# Patient Record
Sex: FEMALE | Race: WHITE | NOT HISPANIC OR LATINO | URBAN - METROPOLITAN AREA
[De-identification: names, ages, dates, MRNs, and addresses within clinical notes are randomized per-mention and may not be internally consistent; named-entity substitution may affect disease eponyms.]

---

## 2018-07-16 ENCOUNTER — OUTPATIENT (OUTPATIENT)
Dept: OUTPATIENT SERVICES | Facility: HOSPITAL | Age: 68
LOS: 1 days | Discharge: HOME | End: 2018-07-16

## 2018-07-16 NOTE — H&P CARDIOLOGY - HISTORY OF PRESENT ILLNESS
67 year old female with pmhx of COPD , DLD, HTN presents for elective cardiac cath after +ve stress echo. with ST depressions and anterseptal and basal inferior changes on echo.

## 2018-07-16 NOTE — PROGRESS NOTE ADULT - SUBJECTIVE AND OBJECTIVE BOX
I have personally seen and examined the patient.  I agree with the history and physical which I have reviewed and noted any changes below.  07-16-18 @ 08:23    PRE-OP DIAGNOSIS: cad    PROCEDURE:    Physician: jyoti  Assistant:    ANESTHESIA TYPE:  [  ]General Anesthesia  [  ] Sedation  [  ] Local/Regional    ESTIMATED BLOOD LOSS:       mL    CONDITION  [  ] Critical  [  ] Serious  [  ]Fair  [  ]Good      SPECIMENS REMOVED (IF APPLICABLE):      IV CONTRAST:             mL      IMPLANTS (IF APPLICABLE)      FINDINGS    Left Heart Catheterization:  LVEF%:  LVEDP:  [ ] Normal Coronary Arteries  [ ] Luminal Irregularities  [ ] Non-obstructive CAD      LEFT HEART CATHERIZATION                                    Left main     LAD:                        Diag:    Left Circumflex:  OM:    Right Cornary Artery:  RPDA  RPL    Ramus Intermed:    INTERVENTION  IMPLANTS:    RIGHT HEART CATHERIZATION  PA:  PCW:  CO/CI:      POST-OP DIAGNOSIS          PLAN OF CARE  [ ] D/C Home today  [ ]  D/C in AM  [ ] Return to In-patient bed  [ ] Admit for observation  [ ] Return for staged procedure:  [ ] CT Surgery consult called  [ ]  Continue DAPT, B-blocker & Statin therapy I have personally seen and examined the patient.  I agree with the history and physical which I have reviewed and noted any changes below.  07-16-18 @ 08:23    PRE-OP DIAGNOSIS: cad    PROCEDURE: lhc/sca    Physician: jyoti  Assistant: ling    ANESTHESIA TYPE:  [  ]General Anesthesia  [ x ] Sedation  [x  ] Local/Regional    ESTIMATED BLOOD LOSS:  10     mL    CONDITION  [  ] Critical  [  ] Serious  [  ]Fair  [x  ]Good      SPECIMENS REMOVED (IF APPLICABLE):      IV CONTRAST:      10       mL      IMPLANTS (IF APPLICABLE)      FINDINGS    Left Heart Catheterization:  LVEF%:  LVEDP:  [ ] Normal Coronary Arteries  [ ] Luminal Irregularities  [x ] Non-obstructive CAD      POST-OP DIAGNOSIS    non obs cad      PLAN OF CARE  [ x] D/C Home today  [ ]  D/C in AM  [ ] Return to In-patient bed  [ ] Admit for observation  [ ] Return for staged procedure:  [ ] CT Surgery consult called  [x ]  Continue with aggressive risk modif

## 2018-07-18 DIAGNOSIS — I10 ESSENTIAL (PRIMARY) HYPERTENSION: ICD-10-CM

## 2018-07-18 DIAGNOSIS — R94.39 ABNORMAL RESULT OF OTHER CARDIOVASCULAR FUNCTION STUDY: ICD-10-CM

## 2018-07-18 DIAGNOSIS — E78.4 OTHER HYPERLIPIDEMIA: ICD-10-CM

## 2018-07-18 DIAGNOSIS — J44.9 CHRONIC OBSTRUCTIVE PULMONARY DISEASE, UNSPECIFIED: ICD-10-CM

## 2018-07-24 PROBLEM — Z00.00 ENCOUNTER FOR PREVENTIVE HEALTH EXAMINATION: Status: ACTIVE | Noted: 2018-07-24

## 2018-08-03 ENCOUNTER — APPOINTMENT (OUTPATIENT)
Dept: CARDIOLOGY | Facility: CLINIC | Age: 68
End: 2018-08-03

## 2018-08-03 VITALS
BODY MASS INDEX: 24.75 KG/M2 | HEIGHT: 66 IN | DIASTOLIC BLOOD PRESSURE: 68 MMHG | HEART RATE: 63 BPM | WEIGHT: 154 LBS | SYSTOLIC BLOOD PRESSURE: 120 MMHG

## 2018-08-03 DIAGNOSIS — R00.2 PALPITATIONS: ICD-10-CM

## 2018-08-03 DIAGNOSIS — R06.09 OTHER FORMS OF DYSPNEA: ICD-10-CM

## 2018-08-03 DIAGNOSIS — E78.5 HYPERLIPIDEMIA, UNSPECIFIED: ICD-10-CM

## 2018-08-03 DIAGNOSIS — Z78.9 OTHER SPECIFIED HEALTH STATUS: ICD-10-CM

## 2018-08-03 DIAGNOSIS — I25.10 ATHEROSCLEROTIC HEART DISEASE OF NATIVE CORONARY ARTERY W/OUT ANGINA PECTORIS: ICD-10-CM

## 2021-07-19 ENCOUNTER — INPATIENT (INPATIENT)
Facility: HOSPITAL | Age: 71
LOS: 1 days | Discharge: HOME | End: 2021-07-21
Attending: INTERNAL MEDICINE | Admitting: INTERNAL MEDICINE
Payer: MEDICARE

## 2021-07-19 VITALS
RESPIRATION RATE: 18 BRPM | SYSTOLIC BLOOD PRESSURE: 237 MMHG | HEIGHT: 66 IN | HEART RATE: 98 BPM | DIASTOLIC BLOOD PRESSURE: 98 MMHG | OXYGEN SATURATION: 99 % | TEMPERATURE: 98 F | WEIGHT: 149.91 LBS

## 2021-07-19 LAB
ALBUMIN SERPL ELPH-MCNC: 4.3 G/DL — SIGNIFICANT CHANGE UP (ref 3.5–5.2)
ALP SERPL-CCNC: 106 U/L — SIGNIFICANT CHANGE UP (ref 30–115)
ALT FLD-CCNC: 19 U/L — SIGNIFICANT CHANGE UP (ref 0–41)
ANION GAP SERPL CALC-SCNC: 10 MMOL/L — SIGNIFICANT CHANGE UP (ref 7–14)
APTT BLD: 33.6 SEC — SIGNIFICANT CHANGE UP (ref 27–39.2)
AST SERPL-CCNC: 23 U/L — SIGNIFICANT CHANGE UP (ref 0–41)
BASOPHILS # BLD AUTO: 0.05 K/UL — SIGNIFICANT CHANGE UP (ref 0–0.2)
BASOPHILS NFR BLD AUTO: 0.6 % — SIGNIFICANT CHANGE UP (ref 0–1)
BILIRUB SERPL-MCNC: 0.3 MG/DL — SIGNIFICANT CHANGE UP (ref 0.2–1.2)
BUN SERPL-MCNC: 14 MG/DL — SIGNIFICANT CHANGE UP (ref 10–20)
CALCIUM SERPL-MCNC: 9.3 MG/DL — SIGNIFICANT CHANGE UP (ref 8.5–10.1)
CHLORIDE SERPL-SCNC: 104 MMOL/L — SIGNIFICANT CHANGE UP (ref 98–110)
CO2 SERPL-SCNC: 29 MMOL/L — SIGNIFICANT CHANGE UP (ref 17–32)
CREAT SERPL-MCNC: 0.6 MG/DL — LOW (ref 0.7–1.5)
EOSINOPHIL # BLD AUTO: 0.25 K/UL — SIGNIFICANT CHANGE UP (ref 0–0.7)
EOSINOPHIL NFR BLD AUTO: 2.9 % — SIGNIFICANT CHANGE UP (ref 0–8)
GLUCOSE SERPL-MCNC: 82 MG/DL — SIGNIFICANT CHANGE UP (ref 70–99)
HCT VFR BLD CALC: 41.4 % — SIGNIFICANT CHANGE UP (ref 37–47)
HGB BLD-MCNC: 13.6 G/DL — SIGNIFICANT CHANGE UP (ref 12–16)
IMM GRANULOCYTES NFR BLD AUTO: 0.3 % — SIGNIFICANT CHANGE UP (ref 0.1–0.3)
INR BLD: 1.15 RATIO — SIGNIFICANT CHANGE UP (ref 0.65–1.3)
LYMPHOCYTES # BLD AUTO: 1.3 K/UL — SIGNIFICANT CHANGE UP (ref 1.2–3.4)
LYMPHOCYTES # BLD AUTO: 15.1 % — LOW (ref 20.5–51.1)
MCHC RBC-ENTMCNC: 28.9 PG — SIGNIFICANT CHANGE UP (ref 27–31)
MCHC RBC-ENTMCNC: 32.9 G/DL — SIGNIFICANT CHANGE UP (ref 32–37)
MCV RBC AUTO: 88.1 FL — SIGNIFICANT CHANGE UP (ref 81–99)
MONOCYTES # BLD AUTO: 0.8 K/UL — HIGH (ref 0.1–0.6)
MONOCYTES NFR BLD AUTO: 9.3 % — SIGNIFICANT CHANGE UP (ref 1.7–9.3)
NEUTROPHILS # BLD AUTO: 6.2 K/UL — SIGNIFICANT CHANGE UP (ref 1.4–6.5)
NEUTROPHILS NFR BLD AUTO: 71.8 % — SIGNIFICANT CHANGE UP (ref 42.2–75.2)
NRBC # BLD: 0 /100 WBCS — SIGNIFICANT CHANGE UP (ref 0–0)
PLATELET # BLD AUTO: 278 K/UL — SIGNIFICANT CHANGE UP (ref 130–400)
POTASSIUM SERPL-MCNC: 4.1 MMOL/L — SIGNIFICANT CHANGE UP (ref 3.5–5)
POTASSIUM SERPL-SCNC: 4.1 MMOL/L — SIGNIFICANT CHANGE UP (ref 3.5–5)
PROT SERPL-MCNC: 6.8 G/DL — SIGNIFICANT CHANGE UP (ref 6–8)
PROTHROM AB SERPL-ACNC: 13.2 SEC — HIGH (ref 9.95–12.87)
RBC # BLD: 4.7 M/UL — SIGNIFICANT CHANGE UP (ref 4.2–5.4)
RBC # FLD: 14.2 % — SIGNIFICANT CHANGE UP (ref 11.5–14.5)
SARS-COV-2 RNA SPEC QL NAA+PROBE: SIGNIFICANT CHANGE UP
SODIUM SERPL-SCNC: 143 MMOL/L — SIGNIFICANT CHANGE UP (ref 135–146)
TROPONIN T SERPL-MCNC: <0.01 NG/ML — SIGNIFICANT CHANGE UP
WBC # BLD: 8.63 K/UL — SIGNIFICANT CHANGE UP (ref 4.8–10.8)
WBC # FLD AUTO: 8.63 K/UL — SIGNIFICANT CHANGE UP (ref 4.8–10.8)

## 2021-07-19 PROCEDURE — 70498 CT ANGIOGRAPHY NECK: CPT | Mod: 26,MA

## 2021-07-19 PROCEDURE — 99285 EMERGENCY DEPT VISIT HI MDM: CPT | Mod: GC

## 2021-07-19 PROCEDURE — 70496 CT ANGIOGRAPHY HEAD: CPT | Mod: 26,MA

## 2021-07-19 PROCEDURE — 93010 ELECTROCARDIOGRAM REPORT: CPT

## 2021-07-19 PROCEDURE — 73130 X-RAY EXAM OF HAND: CPT | Mod: 26,RT

## 2021-07-19 PROCEDURE — 70450 CT HEAD/BRAIN W/O DYE: CPT | Mod: 26,59,MA

## 2021-07-19 RX ADMIN — Medication 1 MILLIGRAM(S): at 16:00

## 2021-07-19 RX ADMIN — Medication 1 MILLIGRAM(S): at 17:12

## 2021-07-19 NOTE — ED PROVIDER NOTE - CLINICAL SUMMARY MEDICAL DECISION MAKING FREE TEXT BOX
70 year old female no pmh presented with  acute hand weaknees since 1230 today.  she was pulling weeds for a few hours yesterday no numbness. VS reviewed. labs imaging ekg obtained and reviewed. Meds given for anxiety for ct. Labs imaging ekg obtained and reviewed. Neurology consulted, ICU consult placed for CCU and admitted to ccu

## 2021-07-19 NOTE — ED PROVIDER NOTE - PHYSICAL EXAMINATION
CONSTITUTIONAL: Well-developed; well-nourished; in no acute distress.   SKIN: warm, dry  HEAD: Normocephalic; atraumatic.  EYES: PERRL, EOMI, no conjunctival erythema  ENT: No nasal discharge; airway clear.  NECK: Supple; non tender.  CARD: S1, S2 normal; no murmurs, gallops, or rubs. Regular rate and rhythm.   RESP: No wheezes, rales or rhonchi.  ABD: soft ntnd  EXT: Normal ROM.  No clubbing, cyanosis or edema.   LYMPH: No acute cervical adenopathy.  NEURO: CN II-XII grossly intact, no facial asymmetry, no slurred speech. Strength 5/5 in upper and lower extremities. Sensation intact in all extremities. FNF testing normal. No pronator drift. Negative Rhombergs test. Normal gait. Right pincher grasp slightly weaker than the left.   PSYCH: Cooperative, appropriate.

## 2021-07-19 NOTE — ED PROVIDER NOTE - PROGRESS NOTE DETAILS
Pt. examined on arrival. Stroke code contemplated. Not called b/c pt. refused CT scan of head. Will attempt to sedate and then do CT scans. still refusing CT. Still refusing to ct scan BG-Still refusing CT. BG-Still refusing to ct scan BG- Pt. endorses feeling more calm, wants to try to get the CT scan. SR: jacques receieved from dr. dunham patient pending ct. D/w Dr. Morgan of Neuology wants patient admitted for q4 neuro checks. D/w Dr. Soto of ICU.

## 2021-07-19 NOTE — ED PROVIDER NOTE - ATTENDING CONTRIBUTION TO CARE
pt c/o acute hand weaknees since 1230 today.  she was pulling weeds for a few hours yesterday.  no trauma, fever, other weaknees, nyumbness, change in vision, or scpeech,.  She denies neck pain.  Neuro exam CN intact.  motor weakness confined to ulnar nerve distribution.  sensory intact.  DTR intact.  CB testing normal.  NIHSS 0.    I saw the pt soon after arrival and discussed the diff dx which included stroke.  I contemplated calling ta stroke code, but the pt adamantly refused CT scan because it has caused her panic attacks in the past.  A lengthy discussion and negotiation took place.  She finallly considered anti anxiety meds to see if she could tolerate CT.  risks of refusing w/u as plan discussed.

## 2021-07-19 NOTE — ED PROVIDER NOTE - NS ED ROS FT
Eyes:  No visual changes, eye pain or discharge.  ENMT:  No hearing changes, pain, no sore throat or runny nose, no difficulty swallowing  Cardiac:  No chest pain, SOB or edema. No chest pain with exertion.  Respiratory:  No cough or respiratory distress. No hemoptysis. No history of asthma or RAD.  GI:  No nausea, vomiting, diarrhea or abdominal pain.  :  No dysuria, frequency or burning.  MS:  Endorses right hand discomfort.   Neuro:  No headache or weakness.  No LOC.  Skin:  No skin rash.   Endocrine: No history of thyroid disease or diabetes.

## 2021-07-20 LAB
A1C WITH ESTIMATED AVERAGE GLUCOSE RESULT: 5.8 % — HIGH (ref 4–5.6)
ANION GAP SERPL CALC-SCNC: 9 MMOL/L — SIGNIFICANT CHANGE UP (ref 7–14)
BUN SERPL-MCNC: 18 MG/DL — SIGNIFICANT CHANGE UP (ref 10–20)
CALCIUM SERPL-MCNC: 9.6 MG/DL — SIGNIFICANT CHANGE UP (ref 8.5–10.1)
CHLORIDE SERPL-SCNC: 102 MMOL/L — SIGNIFICANT CHANGE UP (ref 98–110)
CHOLEST SERPL-MCNC: 228 MG/DL — HIGH
CO2 SERPL-SCNC: 27 MMOL/L — SIGNIFICANT CHANGE UP (ref 17–32)
CREAT SERPL-MCNC: 0.6 MG/DL — LOW (ref 0.7–1.5)
ESTIMATED AVERAGE GLUCOSE: 120 MG/DL — HIGH (ref 68–114)
GLUCOSE SERPL-MCNC: 83 MG/DL — SIGNIFICANT CHANGE UP (ref 70–99)
HCV AB S/CO SERPL IA: 0.04 COI — SIGNIFICANT CHANGE UP
HCV AB SERPL-IMP: SIGNIFICANT CHANGE UP
HDLC SERPL-MCNC: 55 MG/DL — SIGNIFICANT CHANGE UP
LIPID PNL WITH DIRECT LDL SERPL: 162 MG/DL — HIGH
NON HDL CHOLESTEROL: 173 MG/DL — HIGH
POTASSIUM SERPL-MCNC: 4.3 MMOL/L — SIGNIFICANT CHANGE UP (ref 3.5–5)
POTASSIUM SERPL-SCNC: 4.3 MMOL/L — SIGNIFICANT CHANGE UP (ref 3.5–5)
SODIUM SERPL-SCNC: 138 MMOL/L — SIGNIFICANT CHANGE UP (ref 135–146)
TRIGL SERPL-MCNC: 74 MG/DL — SIGNIFICANT CHANGE UP

## 2021-07-20 PROCEDURE — 93306 TTE W/DOPPLER COMPLETE: CPT | Mod: 26

## 2021-07-20 PROCEDURE — 99232 SBSQ HOSP IP/OBS MODERATE 35: CPT

## 2021-07-20 PROCEDURE — 70470 CT HEAD/BRAIN W/O & W/DYE: CPT | Mod: 26

## 2021-07-20 RX ORDER — ATORVASTATIN CALCIUM 80 MG/1
80 TABLET, FILM COATED ORAL AT BEDTIME
Refills: 0 | Status: DISCONTINUED | OUTPATIENT
Start: 2021-07-20 | End: 2021-07-21

## 2021-07-20 RX ORDER — ASPIRIN/CALCIUM CARB/MAGNESIUM 324 MG
81 TABLET ORAL DAILY
Refills: 0 | Status: DISCONTINUED | OUTPATIENT
Start: 2021-07-20 | End: 2021-07-21

## 2021-07-20 RX ORDER — ENOXAPARIN SODIUM 100 MG/ML
40 INJECTION SUBCUTANEOUS DAILY
Refills: 0 | Status: DISCONTINUED | OUTPATIENT
Start: 2021-07-20 | End: 2021-07-21

## 2021-07-20 RX ORDER — PANTOPRAZOLE SODIUM 20 MG/1
40 TABLET, DELAYED RELEASE ORAL
Refills: 0 | Status: DISCONTINUED | OUTPATIENT
Start: 2021-07-20 | End: 2021-07-20

## 2021-07-20 RX ADMIN — Medication 2 MILLIGRAM(S): at 17:05

## 2021-07-20 RX ADMIN — PANTOPRAZOLE SODIUM 40 MILLIGRAM(S): 20 TABLET, DELAYED RELEASE ORAL at 05:40

## 2021-07-20 RX ADMIN — ATORVASTATIN CALCIUM 80 MILLIGRAM(S): 80 TABLET, FILM COATED ORAL at 21:50

## 2021-07-20 RX ADMIN — ENOXAPARIN SODIUM 40 MILLIGRAM(S): 100 INJECTION SUBCUTANEOUS at 12:20

## 2021-07-20 RX ADMIN — Medication 81 MILLIGRAM(S): at 12:19

## 2021-07-20 NOTE — PHYSICAL THERAPY INITIAL EVALUATION ADULT - GAIT DEVIATIONS NOTED, PT EVAL
stooped posture, dec heel strike/pushoff ,dec WILL/decreased rex/decreased step length/decreased weight-shifting ability

## 2021-07-20 NOTE — H&P ADULT - NSHPLABSRESULTS_GEN_ALL_CORE
13.6   8.63  )-----------( 278      ( 19 Jul 2021 15:40 )             41.4         07-19    143  |  104  |  14  ----------------------------<  82  4.1   |  29  |  0.6<L>    Ca    9.3      19 Jul 2021 16:50    TPro  6.8  /  Alb  4.3  /  TBili  0.3  /  DBili  x   /  AST  23  /  ALT  19  /  AlkPhos  106  07-19        CT Head No Cont (07.19.21): No evidence of acute intracranial hemorrhage, acute territorial infarct, mass or midline shift. Negative CTA of the head and neck No evidence of major vascular stenosis, occlusion, or aneurysm.
CBC- NORMAL.   BMP- NORMAL.   Lipid profile- TC increased. LDL increased.   Cxray- no infiltrate.   CT Brain/ CT A- negative.

## 2021-07-20 NOTE — PHYSICAL THERAPY INITIAL EVALUATION ADULT - LEVEL OF INDEPENDENCE: SUPINE/SIT, REHAB EVAL
Chief Complaint   Patient presents with     RECHECK     Hypercholesterolemia   Lilian Mann LPN   supervision

## 2021-07-20 NOTE — OCCUPATIONAL THERAPY INITIAL EVALUATION ADULT - ADDITIONAL COMMENTS
pt lives with son in private home, no steps outside or inside, walk in shower with grab bars and one grab bar near toilet seat, ambulated with no assistive device PTA and was independent

## 2021-07-20 NOTE — PHYSICAL THERAPY INITIAL EVALUATION ADULT - ACTIVE RANGE OF MOTION EXAMINATION, REHAB EVAL
Please refer to OT eval for BUE/bilateral  lower extremity Active ROM was WFL (within functional limits)

## 2021-07-20 NOTE — H&P ADULT - NSHPPHYSICALEXAM_GEN_ALL_CORE
T(C): 36.8 (07-19-21 @ 20:08), Max: 36.8 (07-19-21 @ 20:08)  HR: 57 (07-19-21 @ 20:08) (57 - 98)  BP: 158/74 (07-19-21 @ 20:08) (158/74 - 237/98)  RR: 18 (07-19-21 @ 20:08) (18 - 18)  SpO2: 100% (07-19-21 @ 20:08) (99% - 100%)        GENERAL: NAD, well-developed  HEAD:  Atraumatic, Normocephalic  EYES: EOMI, PERRLA, conjunctiva and sclera clear  ENT: Normal tympanic membrane. No nasal obstruction or discharge. No tonsillar exudate, swelling or erythema.  NECK: Supple, No JVD  CHEST/LUNG: Clear to auscultation bilaterally; No wheeze  HEART: Regular rate and rhythm; No murmurs, rubs, or gallops  ABDOMEN: Soft, Nontender, Nondistended; Bowel sounds present  EXTREMITIES:  2+ Peripheral Pulses, No clubbing, cyanosis, or edema  PSYCH: AAOx3  NEUROLOGY: non-focal  SKIN: No rashes or lesions
Fully awake and alert.  P- 62/R.  BP- 124/74.  Afebrile.  Lungs- clear.  CVS- Regular. no murmur, rub.  A/S- No tenderness, soft.  CNS- Speech normal. CN- normal. Sensations normal. Power normal. Right hand  normal.

## 2021-07-20 NOTE — PROGRESS NOTE ADULT - ASSESSMENT
Patient is a 70y old  Female who presents with a chief complaint of right hand weakness. pt states she kept dropping objects that she picked up w/ right hand.  Pt denies any PMH , use store bought prilosec for GERD.  Prior to adm . pt denied HA, fever, chills, head trauma.      tia vs cva / hyperlipidemia     - aspirin and Lipitor   - tele   - DC planning and outpt neuro f/u
70 year old female with pmhx of COPD , DLD presented for right hand weakness, working diagnosis is CVA vs TIA    CVA/ right had weakness - improved   -CT Head, CTA H/N non remarkable  neuro cks q 4  Lipitor 80, elevated lipid profile  F/u A1c  F/u Echo  ASA 81  neuro eval  Repeat CT head w/wout contrast in the afternoon as patient cant tolerate MRI, will follow outpatient for standing MRI         Activity as tolerated   Diet DASH/TLC  DVT PPX Lovenox   GI ppx not indicated   Dispo from home   Code full

## 2021-07-20 NOTE — H&P ADULT - NSHPSOCIALHISTORY_GEN_ALL_CORE
Marital Status:  (   )    (   ) Single    (   )    (  )   Lives with: (  ) alone  (  ) children   (  ) spouse   (  ) parents  (  ) other  Recent Travel: No recent travel  Occupation:    Substance Use (street drugs): ( x ) never used  (  ) other:  Tobacco Usage:  ( x  ) never smoked   (   ) former smoker   (   ) current smoker  (     ) pack year  Alcohol Usage: None
non smoker. Lives in her private house.

## 2021-07-20 NOTE — H&P ADULT - HISTORY OF PRESENT ILLNESS
Patient is a 70y old  Female who presents with a chief complaint of right hand weakness  pt states she kept dropping objects that she picked up w/ right hand.  Pt denies any PMH , use store bought prilosec for GERD.  Prior to adm . pt denied HA, fever, chills, head trauma.  CT head negative.  Case discussed w/ neurologist who recommended ICU telemetry admission w q 4 neurochecks.       PAST MEDICAL & SURGICAL HISTORY:  No pertinent past medical history    No significant past surgical history      Allergies    succinylcholine (Other)    Intolerances      FAMILY HISTORY:  Home Medications:    Occupation:  AlCatacelol: Denied  Smoking: Denied  Drug Use: Denied  Marital Status:     ROS: as in HPI; All other systems reviewed are negative    ICU Vital Signs Last 24 Hrs  T(C): 36.8 (19 Jul 2021 20:08), Max: 36.8 (19 Jul 2021 20:08)  T(F): 98.2 (19 Jul 2021 20:08), Max: 98.2 (19 Jul 2021 20:08)  HR: 57 (19 Jul 2021 20:08) (57 - 98)  BP: 158/74 (19 Jul 2021 20:08) (158/74 - 237/98)  BP(mean): --  ABP: --  ABP(mean): --  RR: 18 (19 Jul 2021 20:08) (18 - 18)  SpO2: 100% (19 Jul 2021 20:08) (99% - 100%)        Physical Examination:    General:  Elderly W female No acute distress.  Alert, oriented x 3. , interactive, nonfocal    HEENT: Pupils equal, reactive to light.  Symmetric.    PULM: Clear to auscultation bilaterally, no significant sputum production    CVS: Regular rate and rhythm, no murmurs, rubs, or gallops    ABD: Soft, nondistended, nontender, normoactive bowel sounds, no masses    EXT: No edema, nontender  neuro- unable to perform complete . but pt A & O X3 good muscle strength x 4    SKIN: Warm and well perfused, no rashes noted.              I&O's Detail        LABS:                        13.6   8.63  )-----------( 278      ( 19 Jul 2021 15:40 )             41.4     19 Jul 2021 16:50    143    |  104    |  14     ----------------------------<  82     4.1     |  29     |  0.6      Ca    9.3        19 Jul 2021 16:50    TPro  6.8    /  Alb  4.3    /  TBili  0.3    /  DBili  x      /  AST  23     /  ALT  19     /  AlkPhos  106    19 Jul 2021 16:50  Amylase x     lipase x          CARDIAC MARKERS ( 19 Jul 2021 15:40 )  x     / <0.01 ng/mL / x     / x     / x          CAPILLARY BLOOD GLUCOSE      POCT Blood Glucose.: 99 mg/dL (19 Jul 2021 22:07)    PT/INR - ( 19 Jul 2021 15:40 )   PT: 13.20 sec;   INR: 1.15 ratio         PTT - ( 19 Jul 2021 15:40 )  PTT:33.6 sec      RADIOLOGY: ***     < from: CT Head No Cont (07.19.21 @ 20:17) >    FINDINGS - HEAD CT:    There is mild prominence of the ventricles, cortical sulci, and basal cisterns consistent with patient's age.    Grey-white matter differentiation is preserved.    The fourth ventricle is midline.    There is no acute territorial infarct, intracranial hemorrhage, extra-axial fluid collection or midline shift.    Calcifications are noted in the region of the carotid siphons.    Calvarium is intact. No evidence of depressed skull fracture.    The visualized paranasal sinuses and mastoids are well-aerated.        FINDINGS - NECK CTA:    The visualized aortic arch and great vessel origins are patent. There is no stenosis at the origin of the right brachiocephalic, right and left common carotid, left subclavian, and right and left vertebral arteries.    The right and left common, internal and external carotid arteriesare patent. There is no significant calcification and no stenosis at the common carotid artery bifurcations.  There is no evidence of significant stenosis or occlusion of the common carotid arteries, the carotid artery bifurcations, or along the course of the distal cervical portions of the right and left internal carotid arteries.    Normal branching pattern is noted of the bilateral external carotid arteries.    The vertebral arteries are patent.    FINDINGS - HEAD CTA:    The distal right and left internal carotid arteries are patent with mild calcification but no flow-limiting stenosis in the region of the cavernous and supraclinoid portions of the ICAs.    The ophthalmic arteries are patent.    There is normal contrast filling of the middle cerebral arteries and the anterior cerebral arteries bilaterally. No evidence of stenosis, occlusion or aneurysm.    The distal vertebral arteries are patent. The basilar artery is patent. There is normal filling of the PCAs, SCAs, PICAs and AICAsbilaterally. There is persistent fetal circulation of the left posterior cerebral artery from the left posterior communicating artery (anatomic variation). No evidence of stenosis, occlusion or aneurysm.      No venous abnormalities are noted. No evidence of filling defects within the venous sinuses.    OTHER: Bilateral apical pleural thickening is noted. There is a 6.7 mm hypodense nodule in the region of the right lobe of the thyroid gland that may be further evaluated by means of nonemergent thyroid sonography.    IMPRESSION:    No evidence of acute intracranial hemorrhage, acute territorial infarct, mass or midline shift.    Negative CTA of the head and neck    No evidence of major vascular stenosis, occlusion, or aneursm    VANE MAIN MD; Attending Interventional Radiologist  This document has been electronically signed. Jul 19 2021 10:23PM          IMPRESSION:  CVA      PLAN:  adm. to ICU telemetry (as per intensivistCharles)  neuro cks q 4  lipitor 80  ASA 81  neuro eval      CRITICAL CARE TIME SPENT: ***

## 2021-07-20 NOTE — PHYSICAL THERAPY INITIAL EVALUATION ADULT - GENERAL OBSERVATIONS, REHAB EVAL
09:55-10:20 Chart reviewed. Pt encountered semireclined in bed,  may be seen by Physical Therapist as confirmed with Nurse. Patient denied pain at rest and ready to walk now; +tele

## 2021-07-20 NOTE — PHYSICAL THERAPY INITIAL EVALUATION ADULT - PHYSICAL ASSIST/NONPHYSICAL ASSIST: SIT/STAND, REHAB EVAL
hand placement and technique for safety/verbal cues/1 person assist
Principal Discharge DX:	Near syncope  Secondary Diagnosis:	Palpitations

## 2021-07-20 NOTE — H&P ADULT - ASSESSMENT
Assessment-  1. Weakness Right hand. Doubt CVA.  2. Hyperlipidemia.      Recommendations-  1. Admit and monitor on Telemetry for neurological check up.  2. Monitor for any bradycardia or hypoglycemia.  3. Add Statin.

## 2021-07-21 ENCOUNTER — TRANSCRIPTION ENCOUNTER (OUTPATIENT)
Age: 71
End: 2021-07-21

## 2021-07-21 VITALS
RESPIRATION RATE: 22 BRPM | TEMPERATURE: 97 F | SYSTOLIC BLOOD PRESSURE: 169 MMHG | HEART RATE: 75 BPM | OXYGEN SATURATION: 98 % | DIASTOLIC BLOOD PRESSURE: 77 MMHG

## 2021-07-21 LAB
ALBUMIN SERPL ELPH-MCNC: 3.8 G/DL — SIGNIFICANT CHANGE UP (ref 3.5–5.2)
ALP SERPL-CCNC: 95 U/L — SIGNIFICANT CHANGE UP (ref 30–115)
ALT FLD-CCNC: 13 U/L — SIGNIFICANT CHANGE UP (ref 0–41)
ANION GAP SERPL CALC-SCNC: 10 MMOL/L — SIGNIFICANT CHANGE UP (ref 7–14)
AST SERPL-CCNC: 16 U/L — SIGNIFICANT CHANGE UP (ref 0–41)
BASOPHILS # BLD AUTO: 0.04 K/UL — SIGNIFICANT CHANGE UP (ref 0–0.2)
BASOPHILS NFR BLD AUTO: 0.6 % — SIGNIFICANT CHANGE UP (ref 0–1)
BILIRUB SERPL-MCNC: <0.2 MG/DL — SIGNIFICANT CHANGE UP (ref 0.2–1.2)
BUN SERPL-MCNC: 18 MG/DL — SIGNIFICANT CHANGE UP (ref 10–20)
CALCIUM SERPL-MCNC: 9.5 MG/DL — SIGNIFICANT CHANGE UP (ref 8.5–10.1)
CHLORIDE SERPL-SCNC: 103 MMOL/L — SIGNIFICANT CHANGE UP (ref 98–110)
CO2 SERPL-SCNC: 28 MMOL/L — SIGNIFICANT CHANGE UP (ref 17–32)
CREAT SERPL-MCNC: 0.7 MG/DL — SIGNIFICANT CHANGE UP (ref 0.7–1.5)
EOSINOPHIL # BLD AUTO: 0.37 K/UL — SIGNIFICANT CHANGE UP (ref 0–0.7)
EOSINOPHIL NFR BLD AUTO: 5.1 % — SIGNIFICANT CHANGE UP (ref 0–8)
GLUCOSE SERPL-MCNC: 95 MG/DL — SIGNIFICANT CHANGE UP (ref 70–99)
HCT VFR BLD CALC: 40.5 % — SIGNIFICANT CHANGE UP (ref 37–47)
HGB BLD-MCNC: 12.8 G/DL — SIGNIFICANT CHANGE UP (ref 12–16)
IMM GRANULOCYTES NFR BLD AUTO: 0.4 % — HIGH (ref 0.1–0.3)
LYMPHOCYTES # BLD AUTO: 1.77 K/UL — SIGNIFICANT CHANGE UP (ref 1.2–3.4)
LYMPHOCYTES # BLD AUTO: 24.3 % — SIGNIFICANT CHANGE UP (ref 20.5–51.1)
MAGNESIUM SERPL-MCNC: 2.1 MG/DL — SIGNIFICANT CHANGE UP (ref 1.8–2.4)
MCHC RBC-ENTMCNC: 28.4 PG — SIGNIFICANT CHANGE UP (ref 27–31)
MCHC RBC-ENTMCNC: 31.6 G/DL — LOW (ref 32–37)
MCV RBC AUTO: 90 FL — SIGNIFICANT CHANGE UP (ref 81–99)
MONOCYTES # BLD AUTO: 0.73 K/UL — HIGH (ref 0.1–0.6)
MONOCYTES NFR BLD AUTO: 10 % — HIGH (ref 1.7–9.3)
NEUTROPHILS # BLD AUTO: 4.33 K/UL — SIGNIFICANT CHANGE UP (ref 1.4–6.5)
NEUTROPHILS NFR BLD AUTO: 59.6 % — SIGNIFICANT CHANGE UP (ref 42.2–75.2)
NRBC # BLD: 0 /100 WBCS — SIGNIFICANT CHANGE UP (ref 0–0)
PLATELET # BLD AUTO: 255 K/UL — SIGNIFICANT CHANGE UP (ref 130–400)
POTASSIUM SERPL-MCNC: 4.4 MMOL/L — SIGNIFICANT CHANGE UP (ref 3.5–5)
POTASSIUM SERPL-SCNC: 4.4 MMOL/L — SIGNIFICANT CHANGE UP (ref 3.5–5)
PROT SERPL-MCNC: 5.9 G/DL — LOW (ref 6–8)
RBC # BLD: 4.5 M/UL — SIGNIFICANT CHANGE UP (ref 4.2–5.4)
RBC # FLD: 14.3 % — SIGNIFICANT CHANGE UP (ref 11.5–14.5)
SODIUM SERPL-SCNC: 141 MMOL/L — SIGNIFICANT CHANGE UP (ref 135–146)
WBC # BLD: 7.27 K/UL — SIGNIFICANT CHANGE UP (ref 4.8–10.8)
WBC # FLD AUTO: 7.27 K/UL — SIGNIFICANT CHANGE UP (ref 4.8–10.8)

## 2021-07-21 PROCEDURE — 99238 HOSP IP/OBS DSCHRG MGMT 30/<: CPT

## 2021-07-21 PROCEDURE — 99232 SBSQ HOSP IP/OBS MODERATE 35: CPT

## 2021-07-21 RX ORDER — ATORVASTATIN CALCIUM 80 MG/1
1 TABLET, FILM COATED ORAL
Qty: 30 | Refills: 0
Start: 2021-07-21 | End: 2021-08-19

## 2021-07-21 RX ORDER — ASPIRIN/CALCIUM CARB/MAGNESIUM 324 MG
1 TABLET ORAL
Qty: 30 | Refills: 0
Start: 2021-07-21 | End: 2021-08-19

## 2021-07-21 RX ADMIN — Medication 81 MILLIGRAM(S): at 11:27

## 2021-07-21 RX ADMIN — ENOXAPARIN SODIUM 40 MILLIGRAM(S): 100 INJECTION SUBCUTANEOUS at 11:27

## 2021-07-21 NOTE — DISCHARGE NOTE PROVIDER - CARE PROVIDER_API CALL
James Horne)  EEGEpilepsy; Neurology  [address] [place] [address], [address]  [place] [address]  Phone: [phone]  Fax: [phone]  Follow Up Time: 2 weeks

## 2021-07-21 NOTE — DISCHARGE NOTE NURSING/CASE MANAGEMENT/SOCIAL WORK - PATIENT PORTAL LINK FT
You can access the FollowMyHealth Patient Portal offered by [facility] by registering at the following website: [email or web address]. By joining [facility]’s FollowMyHealth portal, you will also be able to view your health information using other applications (apps) compatible with our system.

## 2021-07-21 NOTE — CONSULT NOTE ADULT - SUBJECTIVE AND OBJECTIVE BOX
HPI: Patient is a 70y old  Female who presents with a chief complaint of right hand weakness  pt states she kept dropping objects that she picked up w/ right hand.  Pt denies any PMH , use store bought prilosec for GERD.  Prior to adm . pt denied HA, fever, chills, head trauma.  CT head negative.  Case discussed w/ neurologist who recommended ICU telemetry admission w q 4 neuro checks.   ptn seen at  bed  side  rt handed  aox3  nad  pleasent  to  talk  to  her      PAST MEDICAL & SURGICAL HISTORY:  No pertinent past medical history    No significant past surgical history      Allergies    succinylcholine (Other)    Intolerances      FAMILY HISTORY:  Home Medications:    Occupation:  Alochol: Denied  Smoking: Denied  Drug Use: Denied  Marital Status:     ROS: as in HPI; All other systems reviewed are negative    ICU Vital Signs Last 24 Hrs  T(C): 36.8 (19 Jul 2021 20:08), Max: 36.8 (19 Jul 2021 20:08)  T(F): 98.2 (19 Jul 2021 20:08), Max: 98.2 (19 Jul 2021 20:08)  HR: 57 (19 Jul 2021 20:08) (57 - 98)  BP: 158/74 (19 Jul 2021 20:08) (158/74 - 237/98)  BP(mean): --  ABP: --  ABP(mean): --  RR: 18 (19 Jul 2021 20:08) (18 - 18)  SpO2: 100% (19 Jul 2021 20:08) (99% - 100%)        Physical Examination:    General:  Elderly W female No acute distress.  Alert, oriented x 3. , interactive, nonfocal    HEENT: Pupils equal, reactive to light.  Symmetric.    PULM: Clear to auscultation bilaterally, no significant sputum production    CVS: Regular rate and rhythm, no murmurs, rubs, or gallops    ABD: Soft, nondistended, nontender, normoactive bowel sounds, no masses    EXT: No edema, nontender  neuro- unable to perform complete . but pt A & O X3 good muscle strength x 4    SKIN: Warm and well perfused, no rashes noted.              I&O's Detail        LABS:                        13.6   8.63  )-----------( 278      ( 19 Jul 2021 15:40 )             41.4     19 Jul 2021 16:50    143    |  104    |  14     ----------------------------<  82     4.1     |  29     |  0.6      Ca    9.3        19 Jul 2021 16:50    TPro  6.8    /  Alb  4.3    /  TBili  0.3    /  DBili  x      /  AST  23     /  ALT  19     /  AlkPhos  106    19 Jul 2021 16:50  Amylase x     lipase x          CARDIAC MARKERS ( 19 Jul 2021 15:40 )  x     / <0.01 ng/mL / x     / x     / x          CAPILLARY BLOOD GLUCOSE      POCT Blood Glucose.: 99 mg/dL (19 Jul 2021 22:07)    PT/INR - ( 19 Jul 2021 15:40 )   PT: 13.20 sec;   INR: 1.15 ratio         PTT - ( 19 Jul 2021 15:40 )  PTT:33.6 sec      RADIOLOGY: ***     < from: CT Head No Cont (07.19.21 @ 20:17) >    FINDINGS - HEAD CT:    There is mild prominence of the ventricles, cortical sulci, and basal cisterns consistent with patient's age.    Grey-white matter differentiation is preserved.    The fourth ventricle is midline.    There is no acute territorial infarct, intracranial hemorrhage, extra-axial fluid collection or midline shift.    Calcifications are noted in the region of the carotid siphons.    Calvarium is intact. No evidence of depressed skull fracture.    The visualized paranasal sinuses and mastoids are well-aerated.        FINDINGS - NECK CTA:    The visualized aortic arch and great vessel origins are patent. There is no stenosis at the origin of the right brachiocephalic, right and left common carotid, left subclavian, and right and left vertebral arteries.    The right and left common, internal and external carotid arteriesare patent. There is no significant calcification and no stenosis at the common carotid artery bifurcations.  There is no evidence of significant stenosis or occlusion of the common carotid arteries, the carotid artery bifurcations, or along the course of the distal cervical portions of the right and left internal carotid arteries.    Normal branching pattern is noted of the bilateral external carotid arteries.    The vertebral arteries are patent.    FINDINGS - HEAD CTA:    The distal right and left internal carotid arteries are patent with mild calcification but no flow-limiting stenosis in the region of the cavernous and supraclinoid portions of the ICAs.    The ophthalmic arteries are patent.    There is normal contrast filling of the middle cerebral arteries and the anterior cerebral arteries bilaterally. No evidence of stenosis, occlusion or aneurysm.    The distal vertebral arteries are patent. The basilar artery is patent. There is normal filling of the PCAs, SCAs, PICAs and AICAsbilaterally. There is persistent fetal circulation of the left posterior cerebral artery from the left posterior communicating artery (anatomic variation). No evidence of stenosis, occlusion or aneurysm.      No venous abnormalities are noted. No evidence of filling defects within the venous sinuses.    OTHER: Bilateral apical pleural thickening is noted. There is a 6.7 mm hypodense nodule in the region of the right lobe of the thyroid gland that may be further evaluated by means of nonemergent thyroid sonography.    IMPRESSION:    No evidence of acute intracranial hemorrhage, acute territorial infarct, mass or midline shift.    Negative CTA of the head and neck    No evidence of major vascular stenosis, occlusion, or aneursm          PTN  REFERRED TO ACUTE  REHAB  FOR  EVAL AND  TX   PAST MEDICAL & SURGICAL HISTORY:  GERD (gastroesophageal reflux disease)        Hospital Course:    TODAY'S SUBJECTIVE & REVIEW OF SYMPTOMS:     Constitutional WNL   Cardio WNL   Resp WNL   GI WNL  Heme WNL  Endo WNL  Skin WNL  MSK WNL  Neuro WNL  Cognitive WNL  Psych WNL      MEDICATIONS  (STANDING):  aspirin  chewable 81 milliGRAM(s) Oral daily  atorvastatin 80 milliGRAM(s) Oral at bedtime  enoxaparin Injectable 40 milliGRAM(s) SubCutaneous daily    MEDICATIONS  (PRN):  LORazepam   Injectable 2 milliGRAM(s) IV Push once PRN before CT scan      FAMILY HISTORY:      Allergies    succinylcholine (Other)    Intolerances        SOCIAL HISTORY:    [  ] Etoh  [  ] Smoking  [  ] Substance abuse     Home Environment:  [  ] Home Alone  [xx] Lives with Family  [  ] Home Health Aid    Dwelling:  [  ] Apartment  [ x ] Private House  [  ] Adult Home  [  ] Skilled Nursing Facility      [  ] Short Term  [  ] Long Term  [ x ] Stairs       Elevator [  ]    FUNCTIONAL STATUS PTA: (Check all that apply)  Ambulation: [ x]Independent    [  ] Dependent     [  ] Non-Ambulatory  Assistive Device: [  ] SA Cane  [  ]  Q Cane  [  ] Walker  [  ]  Wheelchair  ADL : [  x] Independent  [  ]  Dependent       Vital Signs Last 24 Hrs  T(C): 36.9 (20 Jul 2021 15:32), Max: 36.9 (20 Jul 2021 15:32)  T(F): 98.4 (20 Jul 2021 15:32), Max: 98.4 (20 Jul 2021 15:32)  HR: 81 (20 Jul 2021 15:23) (57 - 81)  BP: 140/63 (20 Jul 2021 15:23) (128/64 - 188/75)  BP(mean): 91 (20 Jul 2021 15:23) (87 - 91)  RR: 20 (20 Jul 2021 15:32) (17 - 20)  SpO2: 93% (20 Jul 2021 07:01) (93% - 100%)      PHYSICAL EXAM: Alert & Oriented X3  GENERAL: NAD, well-groomed, well-developed  HEAD:  Atraumatic, Normocephalic  EYES: EOMI, PERRLA, conjunctiva and sclera clear  NECK: Supple, No JVD, Normal thyroid  CHEST/LUNG: Clear to percussion bilaterally; No rales, rhonchi, wheezing, or rubs  HEART: Regular rate and rhythm; No murmurs, rubs, or gallops  ABDOMEN: Soft, Nontender, Nondistended; Bowel sounds present  EXTREMITIES:  2+ Peripheral Pulses, No clubbing, cyanosis, or edema    NERVOUS SYSTEM:  Cranial Nerves 2-12 intact [ x ] Abnormal  [  ]  ROM: WFL all extremities [ x ]  Abnormal [  ]  Motor Strength: WFL all extremities  x[x ]  Abnormal [  ]  Sensation: intact to light touch [x  ] Abnormal [  ]  Reflexes: Symmetric [ x ]  Abnormal [  ]    FUNCTIONAL STATUS:  Bed Mobility: Independent [  ]  Supervision [  ]  Needs Assistance [  ]  N/A [  ]  Transfers: Independent [ x ]  Supervision [  ]  Needs Assistance [  ]  N/A [  ]   Ambulation: Independent [ x ]  Supervision [  ]  Needs Assistance [  ]  N/A [  ]  ADL: Independent [x  ] Requires Assistance [  ] N/A [  ]  SEE PT/OT IE NOTES    LABS:                        13.6   8.63  )-----------( 278      ( 19 Jul 2021 15:40 )             41.4     07-20    138  |  102  |  18  ----------------------------<  83  4.3   |  27  |  0.6<L>    Ca    9.6      20 Jul 2021 05:21    TPro  6.8  /  Alb  4.3  /  TBili  0.3  /  DBili  x   /  AST  23  /  ALT  19  /  AlkPhos  106  07-19    PT/INR - ( 19 Jul 2021 15:40 )   PT: 13.20 sec;   INR: 1.15 ratio         PTT - ( 19 Jul 2021 15:40 )  PTT:33.6 sec      RADIOLOGY & ADDITIONAL STUDIES:    Assesment:
Patient is a 70y old  Female who presents with a chief complaint of right hand weakness.  r/o CVA, (20 Jul 2021 18:00)      HPI:    Patient is a 70y old  Female who presents with a chief complaint of right hand weakness  pt states she kept dropping objects that she picked up w/ right hand.  Pt denies any PMH , use store bought prilosec for GERD.  Prior to adm . pt denied HA, fever, chills, head trauma.  CT head negative.  Case discussed w/ neurologist who recommended ICU telemetry admission w q 4 neurochecks.       PAST MEDICAL & SURGICAL HISTORY:  No pertinent past medical history    No significant past surgical history      Allergies    succinylcholine (Other)    Intolerances      FAMILY HISTORY:  Home Medications:    Occupation:  Alochol: Denied  Smoking: Denied  Drug Use: Denied  Marital Status:     ROS: as in HPI; All other systems reviewed are negative    ICU Vital Signs Last 24 Hrs  T(C): 36.8 (19 Jul 2021 20:08), Max: 36.8 (19 Jul 2021 20:08)  T(F): 98.2 (19 Jul 2021 20:08), Max: 98.2 (19 Jul 2021 20:08)  HR: 57 (19 Jul 2021 20:08) (57 - 98)  BP: 158/74 (19 Jul 2021 20:08) (158/74 - 237/98)  BP(mean): --  ABP: --  ABP(mean): --  RR: 18 (19 Jul 2021 20:08) (18 - 18)  SpO2: 100% (19 Jul 2021 20:08) (99% - 100%)        Physical Examination:    General:  Elderly W female No acute distress.  Alert, oriented x 3. , interactive, nonfocal    HEENT: Pupils equal, reactive to light.  Symmetric.    PULM: Clear to auscultation bilaterally, no significant sputum production    CVS: Regular rate and rhythm, no murmurs, rubs, or gallops    ABD: Soft, nondistended, nontender, normoactive bowel sounds, no masses    EXT: No edema, nontender  neuro- unable to perform complete . but pt A & O X3 good muscle strength x 4    SKIN: Warm and well perfused, no rashes noted.              I&O's Detail        LABS:                        13.6   8.63  )-----------( 278      ( 19 Jul 2021 15:40 )             41.4     19 Jul 2021 16:50    143    |  104    |  14     ----------------------------<  82     4.1     |  29     |  0.6      Ca    9.3        19 Jul 2021 16:50    TPro  6.8    /  Alb  4.3    /  TBili  0.3    /  DBili  x      /  AST  23     /  ALT  19     /  AlkPhos  106    19 Jul 2021 16:50  Amylase x     lipase x          CARDIAC MARKERS ( 19 Jul 2021 15:40 )  x     / <0.01 ng/mL / x     / x     / x          CAPILLARY BLOOD GLUCOSE      POCT Blood Glucose.: 99 mg/dL (19 Jul 2021 22:07)    PT/INR - ( 19 Jul 2021 15:40 )   PT: 13.20 sec;   INR: 1.15 ratio         PTT - ( 19 Jul 2021 15:40 )  PTT:33.6 sec      RADIOLOGY: ***     < from: CT Head No Cont (07.19.21 @ 20:17) >    FINDINGS - HEAD CT:    There is mild prominence of the ventricles, cortical sulci, and basal cisterns consistent with patient's age.    Grey-white matter differentiation is preserved.    The fourth ventricle is midline.    There is no acute territorial infarct, intracranial hemorrhage, extra-axial fluid collection or midline shift.    Calcifications are noted in the region of the carotid siphons.    Calvarium is intact. No evidence of depressed skull fracture.    The visualized paranasal sinuses and mastoids are well-aerated.        FINDINGS - NECK CTA:    The visualized aortic arch and great vessel origins are patent. There is no stenosis at the origin of the right brachiocephalic, right and left common carotid, left subclavian, and right and left vertebral arteries.    The right and left common, internal and external carotid arteriesare patent. There is no significant calcification and no stenosis at the common carotid artery bifurcations.  There is no evidence of significant stenosis or occlusion of the common carotid arteries, the carotid artery bifurcations, or along the course of the distal cervical portions of the right and left internal carotid arteries.    Normal branching pattern is noted of the bilateral external carotid arteries.    The vertebral arteries are patent.    FINDINGS - HEAD CTA:    The distal right and left internal carotid arteries are patent with mild calcification but no flow-limiting stenosis in the region of the cavernous and supraclinoid portions of the ICAs.    The ophthalmic arteries are patent.    There is normal contrast filling of the middle cerebral arteries and the anterior cerebral arteries bilaterally. No evidence of stenosis, occlusion or aneurysm.    The distal vertebral arteries are patent. The basilar artery is patent. There is normal filling of the PCAs, SCAs, PICAs and AICAsbilaterally. There is persistent fetal circulation of the left posterior cerebral artery from the left posterior communicating artery (anatomic variation). No evidence of stenosis, occlusion or aneurysm.      No venous abnormalities are noted. No evidence of filling defects within the venous sinuses.    OTHER: Bilateral apical pleural thickening is noted. There is a 6.7 mm hypodense nodule in the region of the right lobe of the thyroid gland that may be further evaluated by means of nonemergent thyroid sonography.    IMPRESSION:    No evidence of acute intracranial hemorrhage, acute territorial infarct, mass or midline shift.    Negative CTA of the head and neck    No evidence of major vascular stenosis, occlusion, or aneursm    VANE MAIN MD; Attending Interventional Radiologist  This document has been electronically signed. Jul 19 2021 10:23PM          IMPRESSION:  CVA      PLAN:  adm. to ICU telemetry (as per intensivistCharles)  neuro cks q 4  lipitor 80  ASA 81  neuro eval      CRITICAL CARE TIME SPENT: ***   (20 Jul 2021 00:51)      PAST MEDICAL & SURGICAL HISTORY:  GERD (gastroesophageal reflux disease)        SOCIAL HX:   Smoking                         ETOH                            Other    FAMILY HISTORY:  :  No known cardiovacular family hisotry     Review Of Systems:     All ROS are negative except per HPI       Allergies    succinylcholine (Other)    Intolerances          PHYSICAL EXAM    ICU Vital Signs Last 24 Hrs  T(C): 36.3 (21 Jul 2021 07:05), Max: 36.9 (20 Jul 2021 15:32)  T(F): 97.3 (21 Jul 2021 07:05), Max: 98.4 (20 Jul 2021 15:32)  HR: 75 (21 Jul 2021 07:05) (75 - 82)  BP: 169/77 (21 Jul 2021 07:05) (134/65 - 169/77)  BP(mean): 110 (21 Jul 2021 07:05) (91 - 110)  ABP: --  ABP(mean): --  RR: 17 (21 Jul 2021 07:05) (17 - 20)  SpO2: 98% (21 Jul 2021 07:05) (90% - 98%)      CONSTITUTIONAL:  Well nourished.   NAD    ENT:   Airway patent,   Mouth with normal mucosa.   No thrush      CARDIAC:   Normal rate,   Regular rhythm.    No edema      Vascular:   normal systolic impulse  no bruits    RESPIRATORY:   No wheezing  Bilateral BS   Not tachypneic,  No use of accessory muscles    GASTROINTESTINAL:  Abdomen soft,   Non-tender,   No guarding,       NEUROLOGICAL:   Alert and oriented   No motor deficits.    SKIN:   Skin normal color for race,   No evidence of rash.      HEME LYMPH: .  No cervical  lymphadenopathy.  No inguinal lymphadenopathy              LABS:                          12.8   7.27  )-----------( 255      ( 21 Jul 2021 06:13 )             40.5                                               07-21    141  |  103  |  18  ----------------------------<  95  4.4   |  28  |  0.7    Ca    9.5      21 Jul 2021 06:13  Mg     2.1     07-21    TPro  5.9<L>  /  Alb  3.8  /  TBili  <0.2  /  DBili  x   /  AST  16  /  ALT  13  /  AlkPhos  95  07-21      PT/INR - ( 19 Jul 2021 15:40 )   PT: 13.20 sec;   INR: 1.15 ratio         PTT - ( 19 Jul 2021 15:40 )  PTT:33.6 sec                                           CARDIAC MARKERS ( 19 Jul 2021 15:40 )  x     / <0.01 ng/mL / x     / x     / x                                                LIVER FUNCTIONS - ( 21 Jul 2021 06:13 )  Alb: 3.8 g/dL / Pro: 5.9 g/dL / ALK PHOS: 95 U/L / ALT: 13 U/L / AST: 16 U/L / GGT: x                                                                                                                                       X-Rays reviewed                                                                                     ECHO    CXR interpreted by me     MEDICATIONS  (STANDING):  aspirin  chewable 81 milliGRAM(s) Oral daily  atorvastatin 80 milliGRAM(s) Oral at bedtime  enoxaparin Injectable 40 milliGRAM(s) SubCutaneous daily    MEDICATIONS  (PRN):        
CRISTINA STRANGE     70y     Female    MRN-[id number]                                                           CC:Patient is a 70y old  Female who presents with a chief complaint of right hand weakness.  r/o CVA, (20 Jul 2021 08:29)      HPI:    Patient is a 70y old  Female who presents with a chief complaint of right hand weakness  pt states she kept dropping objects that she picked up w/ right hand.  Pt denies any PMH , use store bought prilosec for GERD.  Prior to adm . pt denied HA, fever, chills, head trauma.  CT head negative.  Case discussed w/ neurologist who recommended ICU telemetry admission w q 4 neurochecks.     Neuro: Patient noticed clumsiness with R hand yesterday. She was dropping things and had difficulty picking them back up. On no meds. Family hx of cardiovascular disease. No pain.    ROS:  Constitutional, Neurological, Psychiatric, Eyes, ENT, Cardiovascular, Respiratory, Gastrointestinal, Genitourinary, Musculoskeletal, Integumentary, Endocrine and Heme/Lymph are otherwise negative.    Social History: No smoking, No drinking, No drug use    FAMILY HISTORY:      HEALTH ISSUES - PROBLEM Dx:          Vital Signs Last 24 Hrs  T(C): 36.2 (20 Jul 2021 07:10), Max: 36.8 (19 Jul 2021 20:08)  T(F): 97.1 (20 Jul 2021 07:10), Max: 98.2 (19 Jul 2021 20:08)  HR: 73 (20 Jul 2021 07:01) (57 - 98)  BP: 128/64 (20 Jul 2021 07:01) (128/64 - 237/98)  BP(mean): 87 (20 Jul 2021 07:01) (87 - 87)  RR: 18 (20 Jul 2021 07:10) (17 - 18)  SpO2: 93% (20 Jul 2021 07:01) (93% - 100%)      Neuro Exam:  Orientation: oriented to person, oriented to place and oriented to time.   Attention and language normal  Fund of knowledge: displays adequate knowledge of personal past history.   Cranial Nerves: visual acuity intact bilaterally, visual fields full to confrontation, pupils equal round and reactive to light, extraocular motion intact, facial sensation intact symmetrically, face symmetrical, hearing was intact bilaterally, tongue and palate midline, head turning and shoulder shrug symmetric and there was no tongue deviation with protrusion.   Motor: muscle tone was normal in all four extremities, muscle strength was normal in all four extremities and normal bulk in all four extremities.   Sensory exam: light touch was intact.   Coordination:.  no tremor present. decreased accuracy with R hand. Upward R finger drift present. Patient missed target with R hand when eyes closed- suggestive of slight proprioceptive loss in R hand  Plantar responses normal on the right, normal on the left.          Allergies    succinylcholine (Other)         Home Medications:      MEDICATIONS  (STANDING):  aspirin  chewable 81 milliGRAM(s) Oral daily  atorvastatin 80 milliGRAM(s) Oral at bedtime  enoxaparin Injectable 40 milliGRAM(s) SubCutaneous daily    MEDICATIONS  (PRN):      LABS:                        13.6   8.63  )-----------( 278      ( 19 Jul 2021 15:40 )             41.4     07-20    138  |  102  |  18  ----------------------------<  83  4.3   |  27  |  0.6<L>    Ca    9.6      20 Jul 2021 05:21    TPro  6.8  /  Alb  4.3  /  TBili  0.3  /  DBili  x   /  AST  23  /  ALT  19  /  AlkPhos  106  07-19    PT/INR - ( 19 Jul 2021 15:40 )   PT: 13.20 sec;   INR: 1.15 ratio         PTT - ( 19 Jul 2021 15:40 )  PTT:33.6 sec      < from: CT Head No Cont (07.19.21 @ 20:17) >  EXAM:  CT ANGIO NECK (W)AW IC        EXAM:  CT ANGIO BRAIN (W)AW IC        EXAM:  CT BRAIN            PROCEDURE DATE:  07/19/2021            INTERPRETATION:  Clinical History / Reason for exam: Weak  right hand.    TECHNIQUE - NONCONTRAST HEAD CT. Contiguous unenhanced CT axial images of the head from the base to the vertex with coronal and sagittal reformats.    TECHNIQUE - CT ANGIOGRAM OF THE HEAD AND NECK: Axial contiguous images were obtained of the head and neck following the intravenous administration of contrast. Reformatted images in the coronal and sagittal planes were acquired, as well as 3DMIP reconstructed images.    Comparison:    FINDINGS - HEAD CT:    There is mild prominence of the ventricles, cortical sulci, and basal cisterns consistent with patient's age.    Grey-white matter differentiation is preserved.    The fourth ventricle is midline.    There is no acute territorial infarct, intracranial hemorrhage, extra-axial fluid collection or midline shift.    Calcifications are noted in the region of the carotid siphons.    Calvarium is intact. No evidence of depressed skull fracture.    The visualized paranasal sinuses and mastoids are well-aerated.        FINDINGS - NECK CTA:    The visualized aortic arch and great vessel origins are patent. There is no stenosis at the origin of the right brachiocephalic, right and left common carotid, left subclavian, and right and left vertebral arteries.    The right and left common, internal and external carotid arteriesare patent. There is no significant calcification and no stenosis at the common carotid artery bifurcations.  There is no evidence of significant stenosis or occlusion of the common carotid arteries, the carotid artery bifurcations, or along the course of the distal cervical portions of the right and left internal carotid arteries.    Normal branching pattern is noted of the bilateral external carotid arteries.    The vertebral arteries are patent.    FINDINGS - HEAD CTA:    The distal right and left internal carotid arteries are patent with mild calcification but no flow-limiting stenosis in the region of the cavernous and supraclinoid portions of the ICAs.    The ophthalmic arteries are patent.    There is normal contrast filling of the middle cerebral arteries and the anterior cerebral arteries bilaterally. No evidence of stenosis, occlusion or aneurysm.    The distal vertebral arteries are patent. The basilar artery is patent. There is normal filling of the PCAs, SCAs, PICAs and AICAsbilaterally. There is persistent fetal circulation of the left posterior cerebral artery from the left posterior communicating artery (anatomic variation). No evidence of stenosis, occlusion or aneurysm.      No venous abnormalities are noted. No evidence of filling defects within the venous sinuses.    OTHER: Bilateral apical pleural thickening is noted. There is a 6.7 mm hypodense nodule in the region of the right lobe of the thyroid gland that may be further evaluated by means of nonemergent thyroid sonography.    IMPRESSION:    No evidence of acute intracranial hemorrhage, acute territorial infarct, mass or midline shift.    Negative CTA of the head and neck    No evidence of major vascular stenosis, occlusion, or aneurysm.    --- End of Report ---              VANE MAIN MD; Attending Interventional Radiologist  This document has been electronically signed. Jul 19 2021 10:23PM

## 2021-07-21 NOTE — DISCHARGE NOTE PROVIDER - HOSPITAL COURSE
70 year old female with pmhx of COPD , DLD presented for right hand weakness, working diagnosis is CVA vs TIA    CVA/ right had weakness - improved   -CT Head, CTA H/N non remarkable  Lipitor 80, elevated lipid profile  F/u 5.8  echo normal, no events on tele   ASA 81  neuro eval  Repeat CT head w/wout contrast after 24 hours negatove. patient claustrophobic will follow outpatient for possible standing MRI        70 year old female with pmhx of COPD , DLD presented for right hand weakness, working diagnosis is CVA vs TIA    CVA/ right had weakness - improved   -CT Head, CTA H/N non remarkable  Lipitor 80, elevated lipid profile  echo normal, no events on tele   ASA 81  Repeat CT head w/wout contrast after 24 hours negatove. patient claustrophobic will follow outpatient for possible standing MRI

## 2021-07-21 NOTE — PROGRESS NOTE ADULT - SUBJECTIVE AND OBJECTIVE BOX
Patient reports her R hand coordination is improved       T(F): 98.4 (07-20-21 @ 15:32), Max: 98.4 (07-20-21 @ 15:32)  HR: 81 (07-20-21 @ 15:23)  BP: 140/63 (07-20-21 @ 15:23)  RR: 20 (07-20-21 @ 15:32)  SpO2: 93% (07-20-21 @ 07:01) (93% - 100%)    PHYSICAL EXAM:  GENERAL: NAD  HEAD:  Atraumatic, Normocephalic  EYES: EOMI, PERRLA, conjunctiva and sclera clear  NERVOUS SYSTEM:  Alert & Oriented X3, no focal deficits   CHEST/LUNG: Clear to percussion bilaterally; No rales, rhonchi, wheezing, or rubs  HEART: Regular rate and rhythm; No murmurs, rubs, or gallops  ABDOMEN: Soft, Nontender, Nondistended; Bowel sounds present  EXTREMITIES:  2+ Peripheral Pulses, No clubbing, cyanosis, or edema    LABS  07-20    Lipid Profile (07.20.21 @ 05:21)   Cholesterol, Serum: 228 mg/dL   Triglycerides, Serum: 74 mg/dL   HDL Cholesterol, Serum: 55 mg/dL   Non HDL Cholesterol: 173: Patients Atherosclerotic Cardiovascular Disease (ASCVD) Risk   138  |  102  |  18  ----------------------------<  83  4.3   |  27  |  0.6<L>    Ca    9.6      20 Jul 2021 05:21    TPro  6.8  /  Alb  4.3  /  TBili  0.3  /  DBili  x   /  AST  23  /  ALT  19  /  AlkPhos  106  07-19                          13.6   8.63  )-----------( 278      ( 19 Jul 2021 15:40 )             41.4     PT/INR - ( 19 Jul 2021 15:40 )   PT: 13.20 sec;   INR: 1.15 ratio         PTT - ( 19 Jul 2021 15:40 )  PTT:33.6 sec    CARDIAC ENZYMES      Troponin T, Serum: <0.01 ng/mL (07-19-21 @ 15:40)    < from: 12 Lead ECG (07.19.21 @ 16:27) >  Diagnosis Line Normal sinus rhythm    < end of copied text >    < from: TTE Echo Complete w/o Contrast w/ Doppler (07.20.21 @ 08:37) >    Summary:   1. Left ventricular ejection fraction, by visual estimation, is 55 to 60%.   2. Mild left ventricular hypertrophy.   3. Spectral Doppler shows impaired relaxation pattern of left ventricular myocardial filling (Grade I diastolic dysfunction).   4. Normal left atrial size.   5. Mild mitral annular dilatation.   6. Trace mitral valve regurgitation.   7. Estimated pulmonary artery systolic pressure is 41.6 mmHg assuming a right atrial pressure of 3 mmHg, which is consistent with mild pulmonary hypertension.   8. Peak transaortic gradient equals 8.9 mmHg, mean transaortic gradient equals 4.1 mmHg, the calculated aortic valve area equals 2.35 cm² by the continuity equation consistent with mild aortic stenosis.    < end of copied text >      RADIOLOGY    MEDICATIONS  (STANDING):  aspirin  chewable 81 milliGRAM(s) Oral daily  atorvastatin 80 milliGRAM(s) Oral at bedtime  enoxaparin Injectable 40 milliGRAM(s) SubCutaneous daily    MEDICATIONS  (PRN):    
ALIECRISTINA  70y  Female      Patient is a 70y old  Female who presents with a chief complaint of right hand weakness.  r/o CVA, (20 Jul 2021 00:51)      INTERVAL HPI/OVERNIGHT EVENTS:  no acute veents overnight    Vital Signs Last 24 Hrs  T(C): 36.2 (20 Jul 2021 07:10), Max: 36.8 (19 Jul 2021 20:08)  T(F): 97.1 (20 Jul 2021 07:10), Max: 98.2 (19 Jul 2021 20:08)  HR: 73 (20 Jul 2021 07:01) (57 - 98)  BP: 128/64 (20 Jul 2021 07:01) (128/64 - 237/98)  BP(mean): 87 (20 Jul 2021 07:01) (87 - 87)  RR: 18 (20 Jul 2021 07:10) (17 - 18)  SpO2: 93% (20 Jul 2021 07:01) (93% - 100%)    PHYSICAL EXAM:  GENERAL: NAD, well-groomed, well-developed  HEAD:  Atraumatic, Normocephalic  EYES: EOMI, PERRLA, conjunctiva and sclera clear  ENMT: Moist mucous membranes, Good dentition, No lesions  NECK: Supple, No JVD, Normal thyroid  NERVOUS SYSTEM:  Alert & Oriented X3, Good concentration; intact neuro exam except subtle weakness in right hand   CHEST/LUNG: Clear to auscultation bilaterally; No rales, rhonchi, wheezing, or rubs  HEART: Regular rate and rhythm; No murmurs, rubs, or gallops  ABDOMEN: Soft, Nontender, Nondistended; Bowel sounds present  EXTREMITIES:  2+ Peripheral Pulses, No clubbing, cyanosis, or edema  LYMPH: No lymphadenopathy noted  SKIN: No rashes or lesions      LABS:                        13.6   8.63  )-----------( 278      ( 19 Jul 2021 15:40 )             41.4     07-20    138  |  102  |  18  ----------------------------<  83  4.3   |  27  |  0.6<L>    Ca    9.6      20 Jul 2021 05:21    TPro  6.8  /  Alb  4.3  /  TBili  0.3  /  DBili  x   /  AST  23  /  ALT  19  /  AlkPhos  106  07-19    PT/INR - ( 19 Jul 2021 15:40 )   PT: 13.20 sec;   INR: 1.15 ratio         PTT - ( 19 Jul 2021 15:40 )  PTT:33.6 sec    
  Neurology Follow up note    Name  CRISTINA STRANGE    HPI:    Patient is a 70y old  Female who presents with a chief complaint of right hand weakness  pt states she kept dropping objects that she picked up w/ right hand.  Pt denies any PMH , use store bought prilosec for GERD.  Prior to adm . pt denied HA, fever, chills, head trauma.  CT head negative.  Case discussed w/ neurologist who recommended ICU telemetry admission w q 4 neurochecks.       Interval History - No new complaints and feels like her hand is normal          Vital Signs Last 24 Hrs  T(C): 36.3 (21 Jul 2021 07:05), Max: 36.3 (21 Jul 2021 07:05)  T(F): 97.3 (21 Jul 2021 07:05), Max: 97.3 (21 Jul 2021 07:05)  HR: 75 (21 Jul 2021 07:05) (75 - 82)  BP: 169/77 (21 Jul 2021 07:05) (134/65 - 169/77)  BP(mean): 110 (21 Jul 2021 07:05) (91 - 110)  RR: 17 (21 Jul 2021 07:05) (17 - 18)  SpO2: 98% (21 Jul 2021 07:05) (90% - 98%)  ICU Vital Signs Last 24 Hrs  T(C): 36.3 (21 Jul 2021 07:05), Max: 36.3 (21 Jul 2021 07:05)  T(F): 97.3 (21 Jul 2021 07:05), Max: 97.3 (21 Jul 2021 07:05)  HR: 75 (21 Jul 2021 07:05) (75 - 82)  BP: 169/77 (21 Jul 2021 07:05) (134/65 - 169/77)  BP(mean): 110 (21 Jul 2021 07:05) (91 - 110)  ABP: --  ABP(mean): --  RR: 17 (21 Jul 2021 07:05) (17 - 18)  SpO2: 98% (21 Jul 2021 07:05) (90% - 98%)          Neurological Exam:   A+Ox4 language and attention normal  CN 2-12 normal  No drift   power 5/5  LACEY, LT symmetric  FTN NL with eyes open and closed  Gait deferred    NIHSS 0    Medications  aspirin  chewable 81 milliGRAM(s) Oral daily  atorvastatin 80 milliGRAM(s) Oral at bedtime  enoxaparin Injectable 40 milliGRAM(s) SubCutaneous daily      Lab                        12.8   7.27  )-----------( 255      ( 21 Jul 2021 06:13 )             40.5     07-21    141  |  103  |  18  ----------------------------<  95  4.4   |  28  |  0.7    Ca    9.5      21 Jul 2021 06:13  Mg     2.1     07-21    TPro  5.9<L>  /  Alb  3.8  /  TBili  <0.2  /  DBili  x   /  AST  16  /  ALT  13  /  AlkPhos  95  07-21    CAPILLARY BLOOD GLUCOSE        LIVER FUNCTIONS - ( 21 Jul 2021 06:13 )  Alb: 3.8 g/dL / Pro: 5.9 g/dL / ALK PHOS: 95 U/L / ALT: 13 U/L / AST: 16 U/L / GGT: x               Radiology  < from: CT Head w/wo IV Cont (07.20.21 @ 18:05) >  IMPRESSION:  No acute intracranial pathology. No evidence of midline shift, mass effect or intracranial hemorrhage.    No abnormal intracranial enhancement to suggest metastatic disease though evaluation for leptomeningeal disease is inherently limited due to CT technique.    < end of copied text >    < from: CT Angio Head w/ IV Cont (07.19.21 @ 20:26) >    PROCEDURE DATE:  07/19/2021            INTERPRETATION:  Clinical History / Reason for exam: Weak  right hand.    TECHNIQUE - NONCONTRAST HEAD CT. Contiguous unenhanced CT axial images of the head from the base to the vertex with coronal and sagittal reformats.    TECHNIQUE - CT ANGIOGRAM OF THE HEAD AND NECK: Axial contiguous images were obtained of the head and neck following the intravenous administration of contrast. Reformatted images in the coronal and sagittal planes were acquired, as well as 3DMIP reconstructed images.    Comparison:    FINDINGS - HEAD CT:    There is mild prominence of the ventricles, cortical sulci, and basal cisterns consistent with patient's age.    Grey-white matter differentiation is preserved.    The fourth ventricle is midline.    There is no acute territorial infarct, intracranial hemorrhage, extra-axial fluid collection or midline shift.    Calcifications are noted in the region of the carotid siphons.    Calvarium is intact. No evidence of depressed skull fracture.    The visualized paranasal sinuses and mastoids are well-aerated.        FINDINGS - NECK CTA:    The visualized aortic arch and great vessel origins are patent. There is no stenosis at the origin of the right brachiocephalic, right and left common carotid, left subclavian, and right and left vertebral arteries.    The right and left common, internal and external carotid arteriesare patent. There is no significant calcification and no stenosis at the common carotid artery bifurcations.  There is no evidence of significant stenosis or occlusion of the common carotid arteries, the carotid artery bifurcations, or along the course of the distal cervical portions of the right and left internal carotid arteries.    Normal branching pattern is noted of the bilateral external carotid arteries.    The vertebral arteries are patent.    FINDINGS - HEAD CTA:    The distal right and left internal carotid arteries are patent with mild calcification but no flow-limiting stenosis in the region of the cavernous and supraclinoid portions of the ICAs.    The ophthalmic arteries are patent.    There is normal contrast filling of the middle cerebral arteries and the anterior cerebral arteries bilaterally. No evidence of stenosis, occlusion or aneurysm.    The distal vertebral arteries are patent. The basilar artery is patent. There is normal filling of the PCAs, SCAs, PICAs and AICAsbilaterally. There is persistent fetal circulation of the left posterior cerebral artery from the left posterior communicating artery (anatomic variation). No evidence of stenosis, occlusion or aneurysm.      No venous abnormalities are noted. No evidence of filling defects within the venous sinuses.    OTHER: Bilateral apical pleural thickening is noted. There is a 6.7 mm hypodense nodule in the region of the right lobe of the thyroid gland that may be further evaluated by means of nonemergent thyroid sonography.    IMPRESSION:    No evidence of acute intracranial hemorrhage, acute territorial infarct, mass or midline shift.    Negative CTA of the head and neck    No evidence of major vascular stenosis, occlusion, or aneurysm.    < end of copied text >      Other studies:     Assessment- This is a 70y year old Female presenting with right hand dysfunction suggestive of an acute stroke.  She refused MRI brain so CTH was repeated and did not show any changes.  Her exam is improved and normal now.  Likely small stroke    Plan  1. OK to DC on aspirin and statin  2. Standup MRI brain as out patient  3. EPS as out patient for event monitor or ILR (given history of palpitations  4. F/u in stroke clinic in 1-2 weeks  5. f/u on ECHO

## 2021-07-21 NOTE — DISCHARGE NOTE PROVIDER - NSDCMRMEDTOKEN_GEN_ALL_CORE_FT
aspirin 81 mg oral tablet, chewable: 1 tab(s) orally once a day  atorvastatin 80 mg oral tablet: 1 tab(s) orally once a day (at bedtime)

## 2021-07-21 NOTE — DISCHARGE NOTE PROVIDER - NSDCCPCAREPLAN_GEN_ALL_CORE_FT
PRINCIPAL DISCHARGE DIAGNOSIS  Diagnosis: Hand weakness  Assessment and Plan of Treatment: you had a possible stroke, 2 CT scan were negative, no MRI done as you are claustrophobic, please follow up with neuro in 2 weeks, you may need MRI to be done outpatient. take medications as prescribed and follow up with primary care doctor for better control of your cholestrol levels and blood pressure

## 2021-07-21 NOTE — CONSULT NOTE ADULT - ASSESSMENT
IMPRESSION:  TIA  HO COPD    PLAN:    CNS: avoid sedatives, f/u repeat CTH results. refused MRI    HEENT: Oral care    PULMONARY:  HOB @ 45 degrees.  Aspiration precautions     CARDIOVASCULAR: I=O    GI: GI prophylaxis.  Feeding.  Bowel regimen     RENAL:  Follow up lytes.  Correct as needed    INFECTIOUS DISEASE: Follow up cultures    HEMATOLOGICAL:  DVT prophylaxis.    ENDOCRINE:  Follow up FS.  Insulin protocol if needed.    MUSCULOSKELETAL: oobtc    Dispo: D/C planning        
Patient is a 70 year old female with no PMH presenting with R hand weakness x1 day. Exam suggestive of slight proprioceptive loss in R hand, which is now improved per pt. CTH, CTAH/N unrevealing. Patient refusing MRI d/t claustrophobia. High LDL.    Recommend:  - risk of sedation for MRI > benefit at this time, patient call follow up outpatient in 1-2 weeks in stroke clinic and arrange for stand up MRI of head with and without contrast  - q4h neuro checks for total 24 hours, then q8h  - TTE with bubble study  - aspirin 81 qd after loading dose, lipitor 80  - OT     seen and examined with Dr. Reid
IMPRESSION: Rehab of 71 y/o  f  rehab  for TIA     PRECAUTIONS: [  ] Cardiac  [  ] Respiratory  [  ] Seizures [  ] Contact Isolation  [  ] Droplet Isolation  [ FALL ] Other    Weight Bearing Status:     RECOMMENDATION:    Out of Bed to Chair     DVT/Decubiti Prophylaxis    REHAB PLAN:     [  x ] Bedside P/T 3-5 times a week   [ x  ]   Bedside O/T  2-3 times a week             [   ] No Rehab Therapy Indicated                   [   ]  Speech Therapy   Conditioning/ROM                                    ADL  Bed Mobility                                               Conditioning/ROM  Transfers                                                     Bed Mobility  Sitting /Standing Balance                         Transfers                                        Gait Training                                               Sitting/Standing Balance  Stair Training [   ]Applicable                    Home equipment Eval                                                                        Splinting  [   ] Only      GOALS:   ADL   [   ]   Independent                    Transfers  [   ] Independent                          Ambulation  [   ] Independent     [    ] With device                            [   ]  CG                                                         [   ]  CG                                                                  [   ] CG                            [    ] Min A                                                   [   ] Min A                                                              [   ] Min  A          DISCHARGE PLAN:   [   ]  Good candidate for Intensive Rehabilitation/Hospital based-4A [facility]                                             Will tolerate 3hrs Intensive Rehab Daily                                       [    ]  Short Term Rehab in Skilled Nursing Facility                                       [ xx  ]  Home with Outpatient or  services cont current care                                         [    ]  Possible Candidate for Intensive Hospital based Rehab

## 2021-07-21 NOTE — CONSULT NOTE ADULT - ATTENDING COMMENTS
Attending Statement: I have personally performed a face to face diagnostic evaluation on this patient. The patient is suffering from TIA with  HO COPD.  I have reviewed the above note and agree with the history, exam and suggestions for care, except as I have noted in the text. I have amended the treatment plans as necessary.
Patient seen and examined with PA and agree with above except as noted.  Patients history, notes, labs, imaging, vitals and meds reviewed personally.  Patient with right hand difficulties which have slowly been improving since presentation.  Exam suggests proprioceptive problem with right hand.  DDX: Stroke in left parietal region likely embolic    Plan as above

## 2021-07-28 DIAGNOSIS — I63.9 CEREBRAL INFARCTION, UNSPECIFIED: ICD-10-CM

## 2021-07-28 DIAGNOSIS — G83.21 MONOPLEGIA OF UPPER LIMB AFFECTING RIGHT DOMINANT SIDE: ICD-10-CM

## 2021-07-28 DIAGNOSIS — E78.00 PURE HYPERCHOLESTEROLEMIA, UNSPECIFIED: ICD-10-CM

## 2021-07-28 DIAGNOSIS — Z88.8 ALLERGY STATUS TO OTHER DRUGS, MEDICAMENTS AND BIOLOGICAL SUBSTANCES STATUS: ICD-10-CM

## 2021-07-28 DIAGNOSIS — Z79.82 LONG TERM (CURRENT) USE OF ASPIRIN: ICD-10-CM

## 2021-07-28 DIAGNOSIS — K21.9 GASTRO-ESOPHAGEAL REFLUX DISEASE WITHOUT ESOPHAGITIS: ICD-10-CM

## 2021-07-28 DIAGNOSIS — J44.9 CHRONIC OBSTRUCTIVE PULMONARY DISEASE, UNSPECIFIED: ICD-10-CM

## 2021-08-09 NOTE — H&P CARDIOLOGY - ALCOHOL USE HISTORY SINGLE SELECT
unknown Detail Level: Simple Render Risk Assessment In Note?: yes Additional Notes: -F/U in 3 months

## 2022-01-01 NOTE — H&P CARDIOLOGY - CARDIOVASCULAR
History was provided by the parents.    Beck White is a 4 days male who was brought in for this well child visit.    Current Concerns: breastfeeding troubles    Birth Hx:  Delivery Providers    Delivering clinician: David Hernandez MD   Provider Role    MARAH Denny, Patient Care Assistant     MD Giorgio Dumont MD Alexa Rodriguez, RN Lance Crawford, RN         Baby born at Gestational Age: 37w1d WGA to a 36 year old  mother via repeat  section who had prenatal care.      Complications during pregnancy? Yes hypertension and pre-eclampsia.   Complications during labor or delivery? No   Apgars 9 and 9  Apgars    Living status: Living  Apgars:  1 min.:  5 min.:  10 min.:  15 min.:  20 min.:    Skin color:  1  1       Heart rate:  2  2       Reflex irritability:  2  2       Muscle tone:  2  2       Respiratory effort:  2  2       Total:  9  9       Apgars assigned by: MARAH MORA       Known potentially teratogenic medications used during pregnancy? no  Alcohol during pregnancy? no  Tobacco during pregnancy? no  Other drugs during pregnancy? no    Maternal labs significant for:   GBS negative, Hep B negative, HIV negative, RPR negative, Rubella Immune.  Mother's blood type Opositive.      Nursery course:  - BBT: O positive; Yoseph negative  - TSB of 6.7 at 28 hours = LIR  - circ without complications    Review of Nutrition:  Current diet: breast milk  Current feeding patterns: nursing on demand  Difficulties with feeding? yes - very engorged, red and painful nipples. Feel like he is not getting a deep latch. Mom tried to pump - getting 8-12ml total after nursing him. Trying to get in touch with lactation.   Birth Weight: 2.88 kg (6 lb 5.6 oz); DW 2739g; Today's wt 2615g   Weight change since birth: -9%    Review of Elimination:  Current stooling frequency/day: 2-3 times a day. Still dark tar  consistency  Voiding frequency/day:  3-4 times a day    Sleep/Safety:  Sleeps on back? Yes  In own crib / basinet? Yes  Sleep issues? No  Rear-facing carseat?  Yes     Social Screening:  Current child-care arrangements: in home: primary caregiver is mother  Parental coping and self-care: doing well; no concerns  Secondhand smoke exposure? no    Growth parameters: Noted and are appropriate for age.    Review of Systems  Review of Systems   Constitutional: Negative for activity change, appetite change, crying, decreased responsiveness, fever and irritability.   HENT: Negative for congestion, drooling, ear discharge, mouth sores, rhinorrhea and trouble swallowing.    Eyes: Negative for discharge and redness.   Respiratory: Negative for apnea, cough, choking, wheezing and stridor.    Cardiovascular: Negative for fatigue with feeds, sweating with feeds and cyanosis.   Gastrointestinal: Negative for abdominal distention, blood in stool, constipation, diarrhea and vomiting.   Genitourinary: Negative for decreased urine volume, penile swelling and scrotal swelling.   Musculoskeletal: Negative for extremity weakness and joint swelling.   Skin: Negative for color change, pallor, rash and wound.   Allergic/Immunologic: Negative for food allergies.   Neurological: Negative for seizures.   Hematological: Negative for adenopathy. Does not bruise/bleed easily.     Objective:     Physical Exam  Vitals reviewed.   Constitutional:       Appearance: Normal appearance. He is well-developed.   HENT:      Head: Normocephalic. Anterior fontanelle is flat.      Right Ear: Tympanic membrane normal.      Left Ear: Tympanic membrane normal.      Nose: Nose normal.      Mouth/Throat:      Lips: Pink.      Mouth: Mucous membranes are moist.      Pharynx: Oropharynx is clear.   Eyes:      General: Red reflex is present bilaterally. Visual tracking is normal. Gaze aligned appropriately.         Right eye: No discharge.         Left eye: No  discharge.      Extraocular Movements: Extraocular movements intact.      Conjunctiva/sclera: Conjunctivae normal.      Pupils: Pupils are equal, round, and reactive to light.   Cardiovascular:      Rate and Rhythm: Normal rate and regular rhythm.      Pulses: Normal pulses.      Heart sounds: Normal heart sounds, S1 normal and S2 normal. No murmur heard.  Pulmonary:      Effort: Pulmonary effort is normal.      Breath sounds: Normal breath sounds and air entry.   Abdominal:      General: Abdomen is flat. Bowel sounds are normal.      Palpations: Abdomen is soft.      Tenderness: There is no abdominal tenderness.      Hernia: There is no hernia in the left inguinal area or right inguinal area.   Genitourinary:     Penis: Normal.       Testes: Normal.      Rectum: Normal.   Musculoskeletal:         General: Normal range of motion.      Cervical back: Normal range of motion and neck supple.      Comments: No hip clicks or clunks appreciated   Lymphadenopathy:      Cervical: No cervical adenopathy.   Skin:     General: Skin is warm and dry.      Capillary Refill: Capillary refill takes less than 2 seconds.      Coloration: Skin is not jaundiced.      Findings: No rash.   Neurological:      General: No focal deficit present.      Mental Status: He is alert.      Motor: No abnormal muscle tone.       Assessment:       4 days male infant here for well visit.    1. Well baby, under 8 days old     2.  difficulty in feeding at breast     3.  jaundice   Bilirubin, Direct    Bilirubin, Total,    4. Weight loss         Plan:      1. Anticipatory guidance discussed. Gave handout on well-child issues at this age.    2. Screening tests:    a. State  metabolic screen: pending  b. Hearing screen (OAE, ABR): PASS  c. Congenital heart disease screen passed    3. Feeding:   A. Patient currently feeding breast milk; instructed family on giving Vitamin D supplementation (400 IU) daily if patient  breast feeds.    Mom discussing with lactation. Dr. Meier's information provided to mom    4. Immunizations: Patient received Hepatitis B Vaccine in [facility].    5.  Return to clinic tomorrow for weight and bili check    TCB 13.4 at 86 hours (LL 16.5). Will collect serum levels and call with results.   negative Regular rate & rhythm, normal S1, S2; no murmurs, gallops or rubs; no S3, S4

## 2022-05-26 ENCOUNTER — INPATIENT (INPATIENT)
Facility: HOSPITAL | Age: 72
LOS: 6 days | Discharge: HOME HEALTH | End: 2022-06-02
Attending: INTERNAL MEDICINE | Admitting: INTERNAL MEDICINE
Payer: MEDICARE

## 2022-05-26 VITALS
DIASTOLIC BLOOD PRESSURE: 109 MMHG | RESPIRATION RATE: 20 BRPM | SYSTOLIC BLOOD PRESSURE: 240 MMHG | HEART RATE: 100 BPM | HEIGHT: 66 IN | WEIGHT: 147.49 LBS | OXYGEN SATURATION: 96 %

## 2022-05-26 DIAGNOSIS — I63.9 CEREBRAL INFARCTION, UNSPECIFIED: ICD-10-CM

## 2022-05-26 DIAGNOSIS — Z91.14 PATIENT'S OTHER NONCOMPLIANCE WITH MEDICATION REGIMEN: ICD-10-CM

## 2022-05-26 DIAGNOSIS — Z79.82 LONG TERM (CURRENT) USE OF ASPIRIN: ICD-10-CM

## 2022-05-26 DIAGNOSIS — J44.9 CHRONIC OBSTRUCTIVE PULMONARY DISEASE, UNSPECIFIED: ICD-10-CM

## 2022-05-26 DIAGNOSIS — G83.21 MONOPLEGIA OF UPPER LIMB AFFECTING RIGHT DOMINANT SIDE: ICD-10-CM

## 2022-05-26 DIAGNOSIS — K21.9 GASTRO-ESOPHAGEAL REFLUX DISEASE WITHOUT ESOPHAGITIS: ICD-10-CM

## 2022-05-26 DIAGNOSIS — F40.240 CLAUSTROPHOBIA: ICD-10-CM

## 2022-05-26 DIAGNOSIS — R29.702 NIHSS SCORE 2: ICD-10-CM

## 2022-05-26 DIAGNOSIS — E78.5 HYPERLIPIDEMIA, UNSPECIFIED: ICD-10-CM

## 2022-05-26 DIAGNOSIS — Z20.822 CONTACT WITH AND (SUSPECTED) EXPOSURE TO COVID-19: ICD-10-CM

## 2022-05-26 DIAGNOSIS — H02.409 UNSPECIFIED PTOSIS OF UNSPECIFIED EYELID: ICD-10-CM

## 2022-05-26 DIAGNOSIS — Z87.891 PERSONAL HISTORY OF NICOTINE DEPENDENCE: ICD-10-CM

## 2022-05-26 DIAGNOSIS — I10 ESSENTIAL (PRIMARY) HYPERTENSION: ICD-10-CM

## 2022-05-26 DIAGNOSIS — Z53.29 PROCEDURE AND TREATMENT NOT CARRIED OUT BECAUSE OF PATIENT'S DECISION FOR OTHER REASONS: ICD-10-CM

## 2022-05-26 DIAGNOSIS — R20.2 PARESTHESIA OF SKIN: ICD-10-CM

## 2022-05-26 DIAGNOSIS — Z88.8 ALLERGY STATUS TO OTHER DRUGS, MEDICAMENTS AND BIOLOGICAL SUBSTANCES: ICD-10-CM

## 2022-05-26 LAB
ALBUMIN SERPL ELPH-MCNC: 4.6 G/DL — SIGNIFICANT CHANGE UP (ref 3.5–5.2)
ALP SERPL-CCNC: 92 U/L — SIGNIFICANT CHANGE UP (ref 30–115)
ALT FLD-CCNC: 20 U/L — SIGNIFICANT CHANGE UP (ref 0–41)
ANION GAP SERPL CALC-SCNC: 14 MMOL/L — SIGNIFICANT CHANGE UP (ref 7–14)
APTT BLD: 37.8 SEC — SIGNIFICANT CHANGE UP (ref 27–39.2)
AST SERPL-CCNC: 24 U/L — SIGNIFICANT CHANGE UP (ref 0–41)
BASOPHILS # BLD AUTO: 0.07 K/UL — SIGNIFICANT CHANGE UP (ref 0–0.2)
BASOPHILS NFR BLD AUTO: 1 % — SIGNIFICANT CHANGE UP (ref 0–1)
BILIRUB SERPL-MCNC: 0.3 MG/DL — SIGNIFICANT CHANGE UP (ref 0.2–1.2)
BLD GP AB SCN SERPL QL: SIGNIFICANT CHANGE UP
BUN SERPL-MCNC: 22 MG/DL — HIGH (ref 10–20)
CALCIUM SERPL-MCNC: 9.4 MG/DL — SIGNIFICANT CHANGE UP (ref 8.5–10.1)
CHLORIDE SERPL-SCNC: 101 MMOL/L — SIGNIFICANT CHANGE UP (ref 98–110)
CO2 SERPL-SCNC: 25 MMOL/L — SIGNIFICANT CHANGE UP (ref 17–32)
CREAT SERPL-MCNC: 0.8 MG/DL — SIGNIFICANT CHANGE UP (ref 0.7–1.5)
EGFR: 78 ML/MIN/1.73M2 — SIGNIFICANT CHANGE UP
EOSINOPHIL # BLD AUTO: 0.41 K/UL — SIGNIFICANT CHANGE UP (ref 0–0.7)
EOSINOPHIL NFR BLD AUTO: 5.6 % — SIGNIFICANT CHANGE UP (ref 0–8)
GLUCOSE SERPL-MCNC: 166 MG/DL — HIGH (ref 70–99)
HCT VFR BLD CALC: 42.9 % — SIGNIFICANT CHANGE UP (ref 37–47)
HGB BLD-MCNC: 14 G/DL — SIGNIFICANT CHANGE UP (ref 12–16)
IMM GRANULOCYTES NFR BLD AUTO: 0.3 % — SIGNIFICANT CHANGE UP (ref 0.1–0.3)
INR BLD: 1.12 RATIO — SIGNIFICANT CHANGE UP (ref 0.65–1.3)
LYMPHOCYTES # BLD AUTO: 2.33 K/UL — SIGNIFICANT CHANGE UP (ref 1.2–3.4)
LYMPHOCYTES # BLD AUTO: 31.9 % — SIGNIFICANT CHANGE UP (ref 20.5–51.1)
MCHC RBC-ENTMCNC: 28.5 PG — SIGNIFICANT CHANGE UP (ref 27–31)
MCHC RBC-ENTMCNC: 32.6 G/DL — SIGNIFICANT CHANGE UP (ref 32–37)
MCV RBC AUTO: 87.2 FL — SIGNIFICANT CHANGE UP (ref 81–99)
MONOCYTES # BLD AUTO: 0.59 K/UL — SIGNIFICANT CHANGE UP (ref 0.1–0.6)
MONOCYTES NFR BLD AUTO: 8.1 % — SIGNIFICANT CHANGE UP (ref 1.7–9.3)
NEUTROPHILS # BLD AUTO: 3.89 K/UL — SIGNIFICANT CHANGE UP (ref 1.4–6.5)
NEUTROPHILS NFR BLD AUTO: 53.1 % — SIGNIFICANT CHANGE UP (ref 42.2–75.2)
NRBC # BLD: 0 /100 WBCS — SIGNIFICANT CHANGE UP (ref 0–0)
PLATELET # BLD AUTO: 276 K/UL — SIGNIFICANT CHANGE UP (ref 130–400)
POTASSIUM SERPL-MCNC: 3.9 MMOL/L — SIGNIFICANT CHANGE UP (ref 3.5–5)
POTASSIUM SERPL-SCNC: 3.9 MMOL/L — SIGNIFICANT CHANGE UP (ref 3.5–5)
PROT SERPL-MCNC: 6.9 G/DL — SIGNIFICANT CHANGE UP (ref 6–8)
PROTHROM AB SERPL-ACNC: 12.9 SEC — HIGH (ref 9.95–12.87)
RBC # BLD: 4.92 M/UL — SIGNIFICANT CHANGE UP (ref 4.2–5.4)
RBC # FLD: 14 % — SIGNIFICANT CHANGE UP (ref 11.5–14.5)
SARS-COV-2 RNA SPEC QL NAA+PROBE: SIGNIFICANT CHANGE UP
SODIUM SERPL-SCNC: 140 MMOL/L — SIGNIFICANT CHANGE UP (ref 135–146)
TROPONIN T SERPL-MCNC: <0.01 NG/ML — SIGNIFICANT CHANGE UP
WBC # BLD: 7.31 K/UL — SIGNIFICANT CHANGE UP (ref 4.8–10.8)
WBC # FLD AUTO: 7.31 K/UL — SIGNIFICANT CHANGE UP (ref 4.8–10.8)

## 2022-05-26 PROCEDURE — 99222 1ST HOSP IP/OBS MODERATE 55: CPT

## 2022-05-26 PROCEDURE — 93010 ELECTROCARDIOGRAM REPORT: CPT

## 2022-05-26 PROCEDURE — 99285 EMERGENCY DEPT VISIT HI MDM: CPT

## 2022-05-26 PROCEDURE — ZZZZZ: CPT

## 2022-05-26 PROCEDURE — 70450 CT HEAD/BRAIN W/O DYE: CPT | Mod: 26,MA

## 2022-05-26 RX ORDER — HEPARIN SODIUM 5000 [USP'U]/ML
5000 INJECTION INTRAVENOUS; SUBCUTANEOUS EVERY 12 HOURS
Refills: 0 | Status: DISCONTINUED | OUTPATIENT
Start: 2022-05-26 | End: 2022-06-02

## 2022-05-26 RX ORDER — ASPIRIN/CALCIUM CARB/MAGNESIUM 324 MG
81 TABLET ORAL DAILY
Refills: 0 | Status: DISCONTINUED | OUTPATIENT
Start: 2022-05-26 | End: 2022-06-02

## 2022-05-26 RX ORDER — SODIUM CHLORIDE 9 MG/ML
1000 INJECTION INTRAMUSCULAR; INTRAVENOUS; SUBCUTANEOUS
Refills: 0 | Status: DISCONTINUED | OUTPATIENT
Start: 2022-05-26 | End: 2022-05-27

## 2022-05-26 RX ORDER — ATORVASTATIN CALCIUM 80 MG/1
80 TABLET, FILM COATED ORAL AT BEDTIME
Refills: 0 | Status: DISCONTINUED | OUTPATIENT
Start: 2022-05-26 | End: 2022-06-02

## 2022-05-26 RX ORDER — CHLORHEXIDINE GLUCONATE 213 G/1000ML
1 SOLUTION TOPICAL
Refills: 0 | Status: DISCONTINUED | OUTPATIENT
Start: 2022-05-26 | End: 2022-06-02

## 2022-05-26 RX ADMIN — SODIUM CHLORIDE 75 MILLILITER(S): 9 INJECTION INTRAMUSCULAR; INTRAVENOUS; SUBCUTANEOUS at 23:00

## 2022-05-26 RX ADMIN — Medication 0.5 MILLIGRAM(S): at 18:51

## 2022-05-26 RX ADMIN — ATORVASTATIN CALCIUM 80 MILLIGRAM(S): 80 TABLET, FILM COATED ORAL at 22:42

## 2022-05-26 RX ADMIN — Medication 0.5 MILLIGRAM(S): at 18:35

## 2022-05-26 NOTE — ED PROVIDER NOTE - NS ED ROS FT
Constitutional: (-) fever, (-) chills, (-) lethargy  Eyes: (-) eye pain, (-) visual changes, (-) discharge  ENMT: (-) nasal congestion, (-) sore throat. (-) neck pain (-) neck stiffness  Respiratory: (-) cough, (-) SOB, (-) SUÁREZ, (-) respiratory distress  Cardiac: (-) chest pain, (-) palpitations  GI: (-) abdominal pain, (-) nausea, (-) vomiting, (-) diarrhea.  :  (-) dysuria, (-) hematuria, (-) frequency   MS:  (-) back pain, (-) joint pain.  Neuro:  (-) headache, (+) numbness, (-) focal weakness, (-) tingling   Skin:  (-) rash  Except as documented in the HPI,  all other systems are negative

## 2022-05-26 NOTE — ED PROVIDER NOTE - PHYSICAL EXAMINATION
CONSTITUTIONAL: well-appearing, in NAD  SKIN: Warm dry, normal skin turgor  HEAD: NCAT  EYES: EOMI, PERRLA, no scleral icterus, conjunctiva pink  ENT: normal pharynx with no erythema or exudates  NECK: Supple; non tender. Full ROM.  CARD: RRR, no murmurs.  RESP: clear to ausculation b/l. No crackles or wheezing.  ABD: soft, non-tender, non-distended, no rebound or guarding.  EXT: Full ROM, no bony tenderness, no pedal edema, no calf tenderness  NEURO: normal motor. normal sensory. CN II-XII intact. Cerebellar testing normal. Normal gait. NIHSS 1 subjective numbness to face  PSYCH: Cooperative, appropriate.

## 2022-05-26 NOTE — ED ADULT NURSE REASSESSMENT NOTE - NS ED NURSE REASSESS COMMENT FT1
ct tech lane called back, pt refusing the test and becoming combative up in the room. pt being sent back down. md vasquez ojeda will order pt ativan. pt stated "I am going to wait for the ativan to kick in before I go back up for the test." Pt education about the risks about delaying the test for treatment.

## 2022-05-26 NOTE — PATIENT PROFILE ADULT - TOBACCO USE
----- Message from Amanda Aggarwal sent at 4/17/2018  2:53 PM CDT -----  Type: Needs Medical Advice    Who Called:  Patient   Symptoms (please be specific):  [place] tunnel  How long has patient had these symptoms:    Pharmacy name and phone #:  n/a  Best Call Back Number: [phone]  Additional Information: Patient is requesting a referral to orthopedics     Never smoker

## 2022-05-26 NOTE — H&P ADULT - NSHPLABSRESULTS_GEN_ALL_CORE
labs  05-26    140  |  101  |  22<H>  ----------------------------<  166<H>  3.9   |  25  |  0.8    Ca    9.4      26 May 2022 18:25    TPro  6.9  /  Alb  4.6  /  TBili  0.3  /  DBili  x   /  AST  24  /  ALT  20  /  AlkPhos  92  05-26                          14.0   7.31  )-----------( 276      ( 26 May 2022 18:25 )             42.9         PT/INR - ( 26 May 2022 18:25 )   PT: 12.90 sec;   INR: 1.12 ratio         PTT - ( 26 May 2022 18:25 )  PTT:37.8 sec

## 2022-05-26 NOTE — ED PROVIDER NOTE - ATTENDING CONTRIBUTION TO CARE
71-year-old female with past medical history of GERD and previous TIA 5/20/2021 presenting here complaining of loss of dexterity to her right hand that began and after the morning yesterday similar to her previous episode when she was here.  Patient also endorses having numbness on the right face on the way to the ED.  No trauma no dizziness no weakness in the lower extremity.  Stroke alert activated upon arrival.  CONSTITUTIONAL: WA / WN / NAD  HEAD: NCAT  EYES: PERRL; EOMI;   ENT: Normal pharynx; mucous membranes pink/moist, no erythema.  NECK: Supple; no meningeal signs  MSK/EXT: No gross deformities; full range of motion.  SKIN: Warm and dry;   NEURO: AAOx3, Motor 5/5 x 4 extremities, Sensations intact to pain no drift no dysmetria no dysarthria normal speech   PSYCH: Memory Intact, Normal Affect

## 2022-05-26 NOTE — ED ADULT TRIAGE NOTE - CHIEF COMPLAINT QUOTE
"My right hand has been tingling and my right eye has been drooping since last night, I do not know when the last time I felt normal yesterday"

## 2022-05-26 NOTE — ED PROVIDER NOTE - PROGRESS NOTE DETAILS
SR: spoke with dr. lee from telestroke not a TP A candidate as hand >24 hours and numbness to face NIH 1, does not need a CTA BK: Patient refusing cat scan. Voices understanding that CT scan has to be done as soon as possible in case of brain damage and patient says she wants to wait for ativan to make her drowsy before going. BK: Patient still refusing cat scan until she is sedated. SR: spoke with Dr. Morgan from neurology - patient to be admitted for Q4 neurochecks

## 2022-05-26 NOTE — H&P ADULT - ASSESSMENT
A/P:     71 year old woman with GERD hx of TIA ? , presents with tingling of right hand and facial tingling.     - r/o CVA  - needs MRI   - pt is clostrophobic needs ativan before procedure  - neuro fu   - neuro checks q 4 hr  - asa, lipitor   - fu tsh   - fu lipid panel   - DVT ppx

## 2022-05-26 NOTE — ED ADULT NURSE REASSESSMENT NOTE - NS ED NURSE REASSESS COMMENT FT1
pt returned to ed because refusing to lay on ct scan table, iv placed and blood drawn, pt medicated with ativan 0.5 mg IV as ordered. pt states "I only want 2 mg of ativan. i'm not going to ct scan without 2 mg of ativan". md mckee at bedside explaining risks of oversedation for neuro assessment. pt refusing to listen to staff, refusing to go to ct scan.

## 2022-05-26 NOTE — PATIENT PROFILE ADULT - FALL HARM RISK - HARM RISK INTERVENTIONS

## 2022-05-26 NOTE — ED ADULT NURSE REASSESSMENT NOTE - NS ED NURSE REASSESS COMMENT FT1
pt medicated with additional dose of ativan. pt still refusing to go to ct scan. pt educated again of risks of delaying ct scan. pt states "I don't care. if i'm not drowsy enough im not going".

## 2022-05-26 NOTE — H&P ADULT - HISTORY OF PRESENT ILLNESS
Patient is a 71y old  Female who presents with a chief complaint of mouth and right hand tingling worse now.  Pt was at hand specialist this am

## 2022-05-26 NOTE — ED PROVIDER NOTE - CLINICAL SUMMARY MEDICAL DECISION MAKING FREE TEXT BOX
71-year-old female with past medical history of GERD and previous TIA 5/20/2021 presenting here complaining of loss of dexterity to her right hand that began and after the morning yesterday similar to her previous episode when she was here.  Patient also endorses having numbness on the right face on the way to the ED.  No trauma no dizziness no weakness in the lower extremity.  Stroke alert activated upon arrival.  VS reviewed, delay to CT as patient intially refused 2/2 anxiety requested ativan. labs imaging ekg obtained and reviewed, was not a TP A candidate spoke with neurology recommend q4 neuro checks. ICU aware. patient admitted.

## 2022-05-26 NOTE — ED ADULT NURSE NOTE - NSFALLRSKUNASSIST_ED_ALL_ED
INTERVAL HPI:  79 year female with HTN, HLD, MI hx, CVA, Gi bleed, Anemia Depression and Alzheimer's dementia.  Sent from [facility] due to SOB. Possibility of pneumonia vs CHF was raised.   In ED with Respiratory distress required BIPAP support and RVP positive for RSV. Pt not able to provide more details due to dementia. Information from transfer papers and ED physician.  Nods no when asked for smoking.  01/09/20: With massive GI bleed, intubated and transferred to ICU.  01/09/20:  EGD+ Colonoscopy.    OVERNIGHT EVENTS:  In ICU, on Vent.    Vital Signs Last 24 Hrs  T(C): 35.6 (13 Jan 2020 16:02), Max: 36.3 (12 Jan 2020 20:00)  T(F): 96 (13 Jan 2020 16:02), Max: 97.4 (12 Jan 2020 20:00)  HR: 81 (13 Jan 2020 16:00) (55 - 90)  BP: 196/65 (13 Jan 2020 16:00) (136/39 - 213/50)  BP(mean): 99 (13 Jan 2020 16:00) (63 - 99)  RR: 17 (13 Jan 2020 16:00) (10 - 19)  SpO2: 100% (13 Jan 2020 16:00) (99% - 100%)    Mode: AC/ CMV (Assist Control/ Continuous Mandatory Ventilation)  RR (machine): 16  TV (machine): 400  FiO2: 40  PEEP: 5  ITime: 1  MAP: 11  PIP: 34    PHYSICAL EXAM:  GEN:         Awake, responsive and comfortable.  HEENT:    Trach   RESP:        no distress  CVS:          Regular rate and rhythm.   ABD:         Soft, non-tender, non-distended;     MEDICATIONS  (STANDING):  buDESOnide    Inhalation Suspension 0.5 milliGRAM(s) Inhalation every 12 hours  chlorhexidine 0.12% Liquid 15 milliLiter(s) Oral Mucosa every 12 hours  chlorhexidine 4% Liquid 1 Application(s) Topical <User Schedule>  cloNIDine 0.1 milliGRAM(s) Oral every 6 hours  hydrALAZINE 50 milliGRAM(s) Oral every 8 hours  levothyroxine Injectable 25 MICROGram(s) IV Push at bedtime  pantoprazole  Injectable 40 milliGRAM(s) IV Push every 12 hours  propofol Infusion 10 MICROgram(s)/kG/Min (3.678 mL/Hr) IV Continuous <Continuous>  sodium chloride 0.65% Nasal 1 Spray(s) Both Nostrils three times a day    MEDICATIONS  (PRN):  albuterol/ipratropium for Nebulization 3 milliLiter(s) Nebulizer every 6 hours PRN Shortness of Breath and/or Wheezing    LABS:                        8.8    10.29 )-----------( 129      ( 13 Jan 2020 04:22 )             26.2     01-13    139  |  107  |  74<H>  ----------------------------<  101<H>  3.3<L>   |  22  |  3.89<H>    Ca    7.0<L>      13 Jan 2020 04:22  Phos  4.5     01-13  Mg     2.0     01-13    TPro  4.4<L>  /  Alb  1.4<L>  /  TBili  0.3  /  DBili  x   /  AST  12<L>  /  ALT  19  /  AlkPhos  56  01-13 01-09 @ 22:08  pH: 7.48  pCO2: 29  pO2: 205  SaO2: 100        ASSESSMENT AND PLAN:  ·	Acute hypoxic  Respiratory failure .  ·	RSV tracheobronchitis.  ·	Left pleural effusion.  ·	Hypothyroidism with very high TSH.  ·	Anemia.  ·	VRE UTI  ·	Leukocytosis.  ·	Renal Insuffiencey.  ·	CVA by history.  ·	HTN.  ·	HLD.  ·	Alzheimer's dementia.  ·	GI bleed,    Continue Vent, nebulizer and Protonix.  For repeat EGD today. no

## 2022-05-26 NOTE — ED PROVIDER NOTE - OBJECTIVE STATEMENT
70 yo F with PMH of GERD, COPD, HLD, TIAs presenting for R facial numbness that started 30 minutes PTA. Patient also endorses R hand tingling and numbness that started yesterday. Denies fever, cough, congestion, HT, LOC, AC, drug/alcohol use, vision changes, dizziness, CP, SOB, NVD, dysuria.

## 2022-05-26 NOTE — H&P ADULT - NSHPREVIEWOFSYSTEMS_GEN_ALL_CORE
REVIEW OF SYSTEMS  General:	no headache  Skin/Breast:	 no rash   Ophthalmologic:	no blurry vision  ENMT:	no thrush   Respiratory and Thorax: no sob	  Cardiovascular:	no chest pain  Gastrointestinal:	no diarrhea   Genitourinary:	no dysuria  Musculoskeletal:	 right hand tingling   Neurological:	no foal weakness  Psychiatric:	no hallucinations

## 2022-05-27 PROBLEM — K21.9 GASTRO-ESOPHAGEAL REFLUX DISEASE WITHOUT ESOPHAGITIS: Chronic | Status: ACTIVE | Noted: 2021-07-20

## 2022-05-27 LAB
A1C WITH ESTIMATED AVERAGE GLUCOSE RESULT: 5.8 % — HIGH (ref 4–5.6)
ALBUMIN SERPL ELPH-MCNC: 4.2 G/DL — SIGNIFICANT CHANGE UP (ref 3.5–5.2)
ALP SERPL-CCNC: 89 U/L — SIGNIFICANT CHANGE UP (ref 30–115)
ALT FLD-CCNC: 17 U/L — SIGNIFICANT CHANGE UP (ref 0–41)
ANION GAP SERPL CALC-SCNC: 14 MMOL/L — SIGNIFICANT CHANGE UP (ref 7–14)
AST SERPL-CCNC: 20 U/L — SIGNIFICANT CHANGE UP (ref 0–41)
BASOPHILS # BLD AUTO: 0.08 K/UL — SIGNIFICANT CHANGE UP (ref 0–0.2)
BASOPHILS NFR BLD AUTO: 1 % — SIGNIFICANT CHANGE UP (ref 0–1)
BILIRUB SERPL-MCNC: 0.3 MG/DL — SIGNIFICANT CHANGE UP (ref 0.2–1.2)
BUN SERPL-MCNC: 26 MG/DL — HIGH (ref 10–20)
CALCIUM SERPL-MCNC: 9.3 MG/DL — SIGNIFICANT CHANGE UP (ref 8.5–10.1)
CHLORIDE SERPL-SCNC: 104 MMOL/L — SIGNIFICANT CHANGE UP (ref 98–110)
CHOLEST SERPL-MCNC: 241 MG/DL — HIGH
CO2 SERPL-SCNC: 25 MMOL/L — SIGNIFICANT CHANGE UP (ref 17–32)
CREAT SERPL-MCNC: 0.6 MG/DL — LOW (ref 0.7–1.5)
EGFR: 95 ML/MIN/1.73M2 — SIGNIFICANT CHANGE UP
EOSINOPHIL # BLD AUTO: 0.33 K/UL — SIGNIFICANT CHANGE UP (ref 0–0.7)
EOSINOPHIL NFR BLD AUTO: 4.2 % — SIGNIFICANT CHANGE UP (ref 0–8)
ESTIMATED AVERAGE GLUCOSE: 120 MG/DL — HIGH (ref 68–114)
GLUCOSE SERPL-MCNC: 99 MG/DL — SIGNIFICANT CHANGE UP (ref 70–99)
HCT VFR BLD CALC: 42.6 % — SIGNIFICANT CHANGE UP (ref 37–47)
HDLC SERPL-MCNC: 65 MG/DL — SIGNIFICANT CHANGE UP
HGB BLD-MCNC: 13.4 G/DL — SIGNIFICANT CHANGE UP (ref 12–16)
IMM GRANULOCYTES NFR BLD AUTO: 0.3 % — SIGNIFICANT CHANGE UP (ref 0.1–0.3)
LIPID PNL WITH DIRECT LDL SERPL: 158 MG/DL — HIGH
LYMPHOCYTES # BLD AUTO: 1.91 K/UL — SIGNIFICANT CHANGE UP (ref 1.2–3.4)
LYMPHOCYTES # BLD AUTO: 24.1 % — SIGNIFICANT CHANGE UP (ref 20.5–51.1)
MAGNESIUM SERPL-MCNC: 2.2 MG/DL — SIGNIFICANT CHANGE UP (ref 1.8–2.4)
MCHC RBC-ENTMCNC: 28.4 PG — SIGNIFICANT CHANGE UP (ref 27–31)
MCHC RBC-ENTMCNC: 31.5 G/DL — LOW (ref 32–37)
MCV RBC AUTO: 90.3 FL — SIGNIFICANT CHANGE UP (ref 81–99)
MONOCYTES # BLD AUTO: 0.74 K/UL — HIGH (ref 0.1–0.6)
MONOCYTES NFR BLD AUTO: 9.4 % — HIGH (ref 1.7–9.3)
NEUTROPHILS # BLD AUTO: 4.83 K/UL — SIGNIFICANT CHANGE UP (ref 1.4–6.5)
NEUTROPHILS NFR BLD AUTO: 61 % — SIGNIFICANT CHANGE UP (ref 42.2–75.2)
NON HDL CHOLESTEROL: 176 MG/DL — HIGH
NRBC # BLD: 0 /100 WBCS — SIGNIFICANT CHANGE UP (ref 0–0)
PHOSPHATE SERPL-MCNC: 4.5 MG/DL — SIGNIFICANT CHANGE UP (ref 2.1–4.9)
PLATELET # BLD AUTO: 260 K/UL — SIGNIFICANT CHANGE UP (ref 130–400)
POTASSIUM SERPL-MCNC: 4.6 MMOL/L — SIGNIFICANT CHANGE UP (ref 3.5–5)
POTASSIUM SERPL-SCNC: 4.6 MMOL/L — SIGNIFICANT CHANGE UP (ref 3.5–5)
PROT SERPL-MCNC: 6.4 G/DL — SIGNIFICANT CHANGE UP (ref 6–8)
RBC # BLD: 4.72 M/UL — SIGNIFICANT CHANGE UP (ref 4.2–5.4)
RBC # FLD: 14.1 % — SIGNIFICANT CHANGE UP (ref 11.5–14.5)
SODIUM SERPL-SCNC: 143 MMOL/L — SIGNIFICANT CHANGE UP (ref 135–146)
TRIGL SERPL-MCNC: 92 MG/DL — SIGNIFICANT CHANGE UP
WBC # BLD: 7.91 K/UL — SIGNIFICANT CHANGE UP (ref 4.8–10.8)
WBC # FLD AUTO: 7.91 K/UL — SIGNIFICANT CHANGE UP (ref 4.8–10.8)

## 2022-05-27 PROCEDURE — 99231 SBSQ HOSP IP/OBS SF/LOW 25: CPT

## 2022-05-27 PROCEDURE — 72125 CT NECK SPINE W/O DYE: CPT | Mod: 26

## 2022-05-27 PROCEDURE — 99232 SBSQ HOSP IP/OBS MODERATE 35: CPT

## 2022-05-27 RX ADMIN — Medication 2 MILLIGRAM(S): at 16:30

## 2022-05-27 RX ADMIN — ATORVASTATIN CALCIUM 80 MILLIGRAM(S): 80 TABLET, FILM COATED ORAL at 21:32

## 2022-05-27 RX ADMIN — HEPARIN SODIUM 5000 UNIT(S): 5000 INJECTION INTRAVENOUS; SUBCUTANEOUS at 05:46

## 2022-05-27 RX ADMIN — Medication 81 MILLIGRAM(S): at 12:33

## 2022-05-27 RX ADMIN — HEPARIN SODIUM 5000 UNIT(S): 5000 INJECTION INTRAVENOUS; SUBCUTANEOUS at 19:30

## 2022-05-27 RX ADMIN — CHLORHEXIDINE GLUCONATE 1 APPLICATION(S): 213 SOLUTION TOPICAL at 05:46

## 2022-05-27 RX ADMIN — CHLORHEXIDINE GLUCONATE 1 APPLICATION(S): 213 SOLUTION TOPICAL at 19:30

## 2022-05-27 RX ADMIN — Medication 1 MILLIGRAM(S): at 21:32

## 2022-05-27 NOTE — OCCUPATIONAL THERAPY INITIAL EVALUATION ADULT - GENERAL OBSERVATIONS, REHAB EVAL
11:00-11:25 Chart reviewed, ok to treat by Occupational Therapist as confirmed by RN, Pt received semi-riley's in bed +tele in NAD. Pt in agreement with OT IE.

## 2022-05-27 NOTE — PHYSICAL THERAPY INITIAL EVALUATION ADULT - GENERAL OBSERVATIONS, REHAB EVAL
14;30-14;50 pt received semifowler in bed, NAD, +telemonitoring, +hep lock, +BP cuff and +pulse ox, +call bell within reach, bed side table at reach. SCOUT [place] is aware.

## 2022-05-27 NOTE — PROGRESS NOTE ADULT - SUBJECTIVE AND OBJECTIVE BOX
CRISTINA STRANGE  71y, Female  Allergy: succinylcholine (Other)      CHIEF COMPLAINT: TIA r/o CVA (27 May 2022 10:32)      HPI:   Patient is a 71y old  Female who presents with a chief complaint of mouth and right hand tingling worse now.  Pt was at hand specialist this am      (26 May 2022 23:42)    HPI:    FAMILY HISTORY:  No pertinent family history in first degree relatives      PAST MEDICAL & SURGICAL HISTORY:  GERD (gastroesophageal reflux disease)      History of TIAs      No significant past surgical history          SOCIAL HISTORY  Social History:  Lives at home with son  no etoh   no smoking (26 May 2022 23:42)        ROS  General: Denies fevers, chills, nightsweats, weight loss  HEENT: Denies headache, rhinorrhea, sore throat, eye pain  CV: Denies CP, palpitations  PULM: Denies SOB, cough  GI: Denies abdominal pain, diarrhea  : Denies dysuria, hematuria  MSK: Denies arthralgias  SKIN: Denies rash   NEURO: Denies paresthesias, weakness  PSYCH: Denies depression    VITALS:  T(F): 97.7, Max: 99.1 (05-26-22 @ 18:42)  HR: 90  BP: 188/877  RR: 20Vital Signs Last 24 Hrs  T(C): 36.5 (27 May 2022 08:00), Max: 37.3 (26 May 2022 18:42)  T(F): 97.7 (27 May 2022 08:00), Max: 99.1 (26 May 2022 18:42)  HR: 90 (27 May 2022 12:30) (68 - 100)  BP: 188/877 (27 May 2022 12:30) (132/63 - 240/109)  BP(mean): 110 (27 May 2022 12:30) (87 - 125)  RR: 20 (27 May 2022 12:30) (17 - 21)  SpO2: 96% (27 May 2022 04:59) (96% - 99%)    PHYSICAL EXAM:  Gen: NAD, resting in bed  HEENT: Normocephalic, atraumatic  Neck: supple, no lymphadenopathy  CV: Regular rate & regular rhythm  Lungs: decreased BS at bases, no fremitus  Abdomen: Soft, BS present  Ext: Warm, well perfused  Neuro: non focal, awake  Skin: no rash, no erythema  Psych: no SI, HI, Hallucination     TESTS & MEASUREMENTS:                        13.4   7.91  )-----------( 260      ( 27 May 2022 05:37 )             42.6     05-27    143  |  104  |  26<H>  ----------------------------<  99  4.6   |  25  |  0.6<L>    Ca    9.3      27 May 2022 05:37  Phos  4.5     05-27  Mg     2.2     05-27    TPro  6.4  /  Alb  4.2  /  TBili  0.3  /  DBili  x   /  AST  20  /  ALT  17  /  AlkPhos  89  05-27      LIVER FUNCTIONS - ( 27 May 2022 05:37 )  Alb: 4.2 g/dL / Pro: 6.4 g/dL / ALK PHOS: 89 U/L / ALT: 17 U/L / AST: 20 U/L / GGT: x                   QRS axis to [] ° and NSR at a rate of [] BPM. There was no atrial enlargement. There was no ventricular hypertrophy. There were no ST-T changes and all intervals were normal.      INFECTIOUS DISEASES TESTING      RADIOLOGY & ADDITIONAL TESTS:  I have personally reviewed the last Chest xray  CXR      CT      CARDIOLOGY TESTING      MEDICATIONS  aspirin  chewable 81  atorvastatin 80  chlorhexidine 4% Liquid 1  heparin   Injectable 5000  LORazepam   Injectable 2      ANTIBIOTICS:      All available historical data has been reviewed    ASSESSMENT  71y F admitted with FACIAL NUMBNESS        PROBLEMS

## 2022-05-27 NOTE — PROGRESS NOTE ADULT - ASSESSMENT
IMPRESSION:    r/o CVA  COPD      PLAN:    CNS:  CT reviewed.  Neuro checks per Neuro.  ASA & Plavix.    HEENT: Oral care    PULMONARY:  HOB @ 45 degrees.  Aspiration precautions.  c/w home inhalers    CARDIOVASCULAR:  ECHO.  CE x 2.  Keep I = O.    GI: GI prophylaxis.  Feeding as tolerated.  Bowel regimen     RENAL:  Follow up lytes.  Correct as needed    INFECTIOUS DISEASE: Monitor off ABX.    HEMATOLOGICAL:  DVT prophylaxis.    ENDOCRINE:  Follow up FS.  Insulin protocol if needed.      MUSCULOSKELETAL:  OOB to chair, PT/OT    Monitor in SDU IMPRESSION:    r/o CVA  COPD      PLAN:    CNS:  CT reviewed.  Neuro checks per Neuro.  ASA & Atorvastatin    HEENT: Oral care    PULMONARY:  HOB @ 45 degrees.  Aspiration precautions.  c/w home inhalers    CARDIOVASCULAR:  ECHO.  CE x 2.  Keep I = O.    GI: GI prophylaxis.  Feeding as tolerated.  Bowel regimen     RENAL:  Follow up lytes.  Correct as needed    INFECTIOUS DISEASE: Monitor off ABX.    HEMATOLOGICAL:  DVT prophylaxis.    ENDOCRINE:  Follow up FS.  Insulin protocol if needed.      MUSCULOSKELETAL:  OOB to chair, PT/OT    Monitor in SDU

## 2022-05-27 NOTE — SWALLOW BEDSIDE ASSESSMENT ADULT - ASR SWALLOW REFERRAL
Pt is being closely followed by OT, PT and neurology. Pending MRI/neurology/occupational therapy/physical therapy

## 2022-05-27 NOTE — PHYSICAL THERAPY INITIAL EVALUATION ADULT - ADDITIONAL COMMENTS
pt lives in a private house with her son. There are no stairs outside or inside of the house. Prior to admission, pt amb independently without AD.

## 2022-05-27 NOTE — OCCUPATIONAL THERAPY INITIAL EVALUATION ADULT - PERTINENT HX OF CURRENT PROBLEM, REHAB EVAL
Patient is a 71y old  Female who presented to the ED with a chief complaint of mouth and right hand tingling worse now.  Pt reports being seen by chiropractor prior for neck pain

## 2022-05-27 NOTE — PROGRESS NOTE ADULT - ASSESSMENT
# hx of TIA in past - presents for right face and arm numbness after manipulation from chiropractor   asa/statin  pt is very claustrophobic - she is willing to try MRI with 2mg ativan - ordered  would get Ct Cspine noncontrast as well   echo  tele monitor in SDU  neuro consult never placed on admission - placed today - follow up recs  Pt/OT  neuro checks q4 for now    discussed with icu team

## 2022-05-27 NOTE — CONSULT NOTE ADULT - ASSESSMENT
IMPRESSION: Rehab of 71 y  o f  ptn  rehab  for  r/o tia  neuropathy rt ue     PRECAUTIONS: [  ] Cardiac  [  ] Respiratory  [  ] Seizures [  ] Contact Isolation  [  ] Droplet Isolation  [ FALL ] Other    Weight Bearing Status:     RECOMMENDATION:    Out of Bed to Chair     DVT/Decubiti Prophylaxis    REHAB PLAN:     [  x ] Bedside P/T 3-5 times a week   [  x ]   Bedside O/T  2-3 times a week             [   ] No Rehab Therapy Indicated                   [   ]  Speech Therapy   Conditioning/ROM                                    ADL  Bed Mobility                                               Conditioning/ROM  Transfers                                                     Bed Mobility  Sitting /Standing Balance                         Transfers                                        Gait Training                                               Sitting/Standing Balance  Stair Training [   ]Applicable                    Home equipment Eval                                                                        Splinting  [   ] Only      GOALS:   ADL   [  x ]   Independent                    Transfers  [   ] Independent                          Ambulation  [   ] Independent     [   x ] With device                            [   ]  CG                                                         [   ]  CG                                                                  [   ] CG                            [    ] Min A                                                   [   ] Min A                                                              [   ] Min  A          DISCHARGE PLAN:   [   ]  Good candidate for Intensive Rehabilitation/Hospital based-4A [facility]                                             Will tolerate 3hrs Intensive Rehab Daily                                       [    ]  Short Term Rehab in Skilled Nursing Facility                                       [    x]  Home with Outpatient or VN services ptn  need out  ptn OT  and  emg  r/o  neuropathy  , cx  radiculopathy and  neurology  fu                                          [    ]  Possible Candidate for Intensive Hospital based Rehab

## 2022-05-27 NOTE — OCCUPATIONAL THERAPY INITIAL EVALUATION ADULT - PLANNED THERAPY INTERVENTIONS, OT EVAL
ADL retraining/balance training/fine motor coordination training/motor coordination training/transfer training

## 2022-05-27 NOTE — OCCUPATIONAL THERAPY INITIAL EVALUATION ADULT - VISUAL ACUITY
Pt with noted decreased strength in orbicularis oculi 2/2 patient having difficulty maintaining eyelid closure

## 2022-05-27 NOTE — OCCUPATIONAL THERAPY INITIAL EVALUATION ADULT - LEVEL OF INDEPENDENCE: BED TO CHAIR, REHAB EVAL
minimum assist (75% patients effort)
The patient is a 1y8m Female complaining of difficulty breathing.

## 2022-05-27 NOTE — PROGRESS NOTE ADULT - SUBJECTIVE AND OBJECTIVE BOX
Patient is a 71y old  Female who presents with a chief complaint of TIA r/o CVA (27 May 2022 15:49)      INTERVAL HPI/OVERNIGHT EVENTS:   No overnight events   Afebrile, hemodynamically stable     ICU Vital Signs Last 24 Hrs  T(C): 35.3 (27 May 2022 20:26), Max: 36.7 (26 May 2022 22:05)  T(F): 95.6 (27 May 2022 20:26), Max: 98 (26 May 2022 22:05)  HR: 85 (27 May 2022 20:26) (68 - 98)  BP: 174/71 (27 May 2022 20:26) (135/60 - 193/87)  BP(mean): 110 (27 May 2022 12:30) (87 - 125)  ABP: --  ABP(mean): --  RR: 18 (27 May 2022 20:26) (17 - 21)  SpO2: 96% (27 May 2022 04:59) (96% - 99%)    I&O's Summary    26 May 2022 07:01  -  27 May 2022 07:00  --------------------------------------------------------  IN: 600 mL / OUT: 1 mL / NET: 599 mL          LABS:                        13.4   7.91  )-----------( 260      ( 27 May 2022 05:37 )             42.6     05-27    143  |  104  |  26<H>  ----------------------------<  99  4.6   |  25  |  0.6<L>    Ca    9.3      27 May 2022 05:37  Phos  4.5     05-27  Mg     2.2     05-27    TPro  6.4  /  Alb  4.2  /  TBili  0.3  /  DBili  x   /  AST  20  /  ALT  17  /  AlkPhos  89  05-27    PT/INR - ( 26 May 2022 18:25 )   PT: 12.90 sec;   INR: 1.12 ratio         PTT - ( 26 May 2022 18:25 )  PTT:37.8 sec    CAPILLARY BLOOD GLUCOSE            RADIOLOGY & ADDITIONAL TESTS:    Consultant(s) Notes Reviewed:  [x ] YES  [ ] NO    MEDICATIONS  (STANDING):  aspirin  chewable 81 milliGRAM(s) Oral daily  atorvastatin 80 milliGRAM(s) Oral at bedtime  chlorhexidine 4% Liquid 1 Application(s) Topical two times a day  heparin   Injectable 5000 Unit(s) SubCutaneous every 12 hours  LORazepam   Injectable 2 milliGRAM(s) IV Push once    MEDICATIONS  (PRN):  LORazepam   Injectable 1 milliGRAM(s) IV Push once PRN Additional, second dose to be given if patient remains anxious during MRI after receiving first 2mg IV push lorazepam dose  LORazepam   Injectable 1 milliGRAM(s) IV Push once PRN To be given in CT if patient has anxiety that is still present after first, 2mg IV push dose of lorazepam is given      PHYSICAL EXAM:  CONSTITUTIONAL:  Well nourished.  NAD    ENT:   Airway patent,   Mouth with normal mucosa.   No thrush    EYES:   Pupils equal,   Round and reactive to light.    CARDIAC:   Normal rate,   Regular rhythm.    No edema    RESPIRATORY:   No wheezing  Bilateral BS  Normal chest expansion  Not tachypneic,  No use of accessory muscles    GASTROINTESTINAL:  Abdomen soft,   Non-tender,   No guarding,   + BS    MUSCULOSKELETAL:   Range of motion is not limited,  No clubbing, cyanosis    NEUROLOGICAL:   Alert and oriented   + mild decreased strength flexion of digits of R hand, primarily digits 2 & 3; otherwise full ROM of b/l UE and LE  CN2-12 intact  sensation intact throughout b/l UE/LE    SKIN:   Skin normal color for race,   Warm and dry and intact.   No evidence of rash.

## 2022-05-27 NOTE — PROGRESS NOTE ADULT - ASSESSMENT
IMPRESSION:    r/o CVA  COPD      PLAN:    CNS:  CT reviewed.  Neuro checks per Neuro.  ASA & Atorvastatin    HEENT: Oral care    PULMONARY:  HOB @ 45 degrees.  Aspiration precautions.  c/w home inhalers    CARDIOVASCULAR:  ECHO.  CE x 2.  Keep I = O.    GI: GI prophylaxis.  Feeding as tolerated.  Bowel regimen     RENAL:  Follow up lytes.  Correct as needed    INFECTIOUS DISEASE: Monitor off ABX.    HEMATOLOGICAL:  DVT prophylaxis.    ENDOCRINE:  Follow up FS.  Insulin protocol if needed.      MUSCULOSKELETAL:  OOB to chair, PT/OT    Monitor in SDU

## 2022-05-27 NOTE — SWALLOW BEDSIDE ASSESSMENT ADULT - SLP PERTINENT HISTORY OF CURRENT PROBLEM
Pt is a 71y old  Female who presented to ED with a chief complaint of mouth and right hand tingling and facial droop. r/o TIA vs CVA. Per pt, pt with h/o TIA

## 2022-05-27 NOTE — SWALLOW BEDSIDE ASSESSMENT ADULT - SLP GENERAL OBSERVATIONS
Pt alert and awake in bed, speaking with GIORGI Carney. A&Ox4, makes all wants/needs known without difficulty. Pt denies speech/language/cognitive changes, endorses that her only symptoms were left sided "tingling" which progressed to weakness of right hand + R sided facial droop. Weakness in R hand present during this evaluation, however pt no longer presents with facial droop. Denies dysphagia, but states she has difficulty feeding herself d/t weakness. GIORGI Carney was in the room and is aware.

## 2022-05-27 NOTE — PROGRESS NOTE ADULT - SUBJECTIVE AND OBJECTIVE BOX
Patient is a 71y old  Female who presents with a chief complaint of TIA r/o CVA (27 May 2022 08:44)    Over Night Events:  No overnight events.  NIHSS 2    ROS:   All ROS are negative except HPI     PHYSICAL EXAM  ICU Vital Signs Last 24 Hrs  T(C): 36.5 (27 May 2022 08:00), Max: 37.3 (26 May 2022 18:42)  T(F): 97.7 (27 May 2022 08:00), Max: 99.1 (26 May 2022 18:42)  HR: 98 (27 May 2022 08:00) (68 - 100)  BP: 167/72 (27 May 2022 08:00) (132/63 - 240/109)  BP(mean): 99 (27 May 2022 04:59) (87 - 107)  RR: 21 (27 May 2022 08:00) (17 - 21)  SpO2: 96% (27 May 2022 04:59) (96% - 99%)    CONSTITUTIONAL:  Well nourished.  NAD    ENT:   Airway patent,   Mouth with normal mucosa.   No thrush    EYES:   Pupils equal,   Round and reactive to light.    CARDIAC:   Normal rate,   Regular rhythm.    No edema    RESPIRATORY:   No wheezing  Bilateral BS  Normal chest expansion  Not tachypneic,  No use of accessory muscles    GASTROINTESTINAL:  Abdomen soft,   Non-tender,   No guarding,   + BS    MUSCULOSKELETAL:   Range of motion is not limited,  No clubbing, cyanosis    NEUROLOGICAL:   Alert and oriented   No motor deficits.    SKIN:   Skin normal color for race,   Warm and dry and intact.   No evidence of rash.    05-26-22 @ 07:01  -  05-27-22 @ 07:00  --------------------------------------------------------  IN:    sodium chloride 0.9%: 600 mL  Total IN: 600 mL    OUT:    Voided (mL): 1 mL  Total OUT: 1 mL    Total NET: 599 mL    LABS:                        13.4   7.91  )-----------( 260      ( 27 May 2022 05:37 )             42.6     143  |  104  |  26<H>  ----------------------------<  99  4.6   |  25  |  0.6<L>    Ca    9.3      27 May 2022 05:37  Phos  4.5     05-27  Mg     2.2     05-27    TPro  6.4  /  Alb  4.2  /  TBili  0.3  /  DBili  x   /  AST  20  /  ALT  17  /  AlkPhos  89  05-27    PT/INR - ( 26 May 2022 18:25 )   PT: 12.90 sec;   INR: 1.12 ratio    PTT - ( 26 May 2022 18:25 )  PTT:37.8 sec                                           CARDIAC MARKERS ( 26 May 2022 18:25 )  x     / <0.01 ng/mL / x     / x     / x        LIVER FUNCTIONS - ( 27 May 2022 05:37 )  Alb: 4.2 g/dL / Pro: 6.4 g/dL / ALK PHOS: 89 U/L / ALT: 17 U/L / AST: 20 U/L / GGT: x                                                                          MEDICATIONS  (STANDING):  aspirin  chewable 81 milliGRAM(s) Oral daily  atorvastatin 80 milliGRAM(s) Oral at bedtime  chlorhexidine 4% Liquid 1 Application(s) Topical two times a day  heparin   Injectable 5000 Unit(s) SubCutaneous every 12 hours  sodium chloride 0.9%. 1000 milliLiter(s) (75 mL/Hr) IV Continuous <Continuous>    MEDICATIONS  (PRN):

## 2022-05-27 NOTE — CONSULT NOTE ADULT - SUBJECTIVE AND OBJECTIVE BOX
HPI: 71y old  Female who presents with a chief complaint of mouth and right hand tingling worse now.  Pt was at hand specialist this am   ptn seen and exam  c/o rt  hand  weakness and numbness can not pickup things     PTN  REFERRED TO ACUTE  REHAB  FOR  EVAL AND  TX   PAST MEDICAL & SURGICAL HISTORY:  GERD (gastroesophageal reflux disease)      History of TIAs      No significant past surgical history          Hospital Course:    TODAY'S SUBJECTIVE & REVIEW OF SYMPTOMS:     Constitutional WNL   Cardio WNL   Resp WNL   GI WNL  Heme WNL  Endo WNL  Skin WNL  MSK WNL  Neuro WNL  Cognitive WNL  Psych WNL      MEDICATIONS  (STANDING):  aspirin  chewable 81 milliGRAM(s) Oral daily  atorvastatin 80 milliGRAM(s) Oral at bedtime  chlorhexidine 4% Liquid 1 Application(s) Topical two times a day  heparin   Injectable 5000 Unit(s) SubCutaneous every 12 hours  sodium chloride 0.9%. 1000 milliLiter(s) (75 mL/Hr) IV Continuous <Continuous>    MEDICATIONS  (PRN):      FAMILY HISTORY:  No pertinent family history in first degree relatives        Allergies    succinylcholine (Other)    Intolerances        SOCIAL HISTORY:    [  ] Etoh  [  ] Smoking  [  ] Substance abuse     Home Environment:  [  ] Home Alone  [ x ] Lives with Family son  [  ] Home Health Aid    Dwelling:  [  ] Apartment  [ x ] Private House  [  ] Adult Home  [  ] Skilled Nursing Facility      [  ] Short Term  [  ] Long Term  [x  ] Stairs       Elevator [  ]    FUNCTIONAL STATUS PTA: (Check all that apply)  Ambulation: [ x  ]Independent    [  ] Dependent     [  ] Non-Ambulatory  Assistive Device: [  ] SA Cane  [  ]  Q Cane  [  ] Walker  [  ]  Wheelchair  ADL : [ x ] Independent  [  ]  Dependent       Vital Signs Last 24 Hrs  T(C): 36.5 (27 May 2022 08:00), Max: 37.3 (26 May 2022 18:42)  T(F): 97.7 (27 May 2022 08:00), Max: 99.1 (26 May 2022 18:42)  HR: 98 (27 May 2022 08:00) (68 - 100)  BP: 167/72 (27 May 2022 08:00) (132/63 - 240/109)  BP(mean): 99 (27 May 2022 04:59) (87 - 107)  RR: 21 (27 May 2022 08:00) (17 - 21)  SpO2: 96% (27 May 2022 04:59) (96% - 99%)      PHYSICAL EXAM: Alert & Oriented X3  GENERAL: NAD, well-groomed, well-developed  HEAD:  Atraumatic, Normocephalic  EYES: EOMI, PERRLA, conjunctiva and sclera clear  NECK: Supple, No JVD, Normal thyroid  CHEST/LUNG: Clear to percussion bilaterally; No rales, rhonchi, wheezing, or rubs  HEART: Regular rate and rhythm; No murmurs, rubs, or gallops  ABDOMEN: Soft, Nontender, Nondistended; Bowel sounds present  EXTREMITIES:  2+ Peripheral Pulses, No clubbing, cyanosis, or edema    NERVOUS SYSTEM:  Cranial Nerves 2-12 intact [x  ] Abnormal  [  ]  ROM: WFL all extremities [ x ]  Abnormal [  ]  Motor Strength: WFL all extremities  [  ]  Abnormal [ x rt ue  ]  Sensation: intact to light touch [   ] Abnormal [x   rt ue ]  Reflexes: Symmetric [  x]  Abnormal [  ]    FUNCTIONAL STATUS:  Bed Mobility: Independent [ x ]  Supervision [  ]  Needs Assistance [  ]  N/A [  ]  Transfers: Independent [x  ]  Supervision [  ]  Needs Assistance [  ]  N/A [  ]   Ambulation: Independent [  x]  Supervision [  ]  Needs Assistance [  ]  N/A [  ]  ADL: Independent [  ] Requires Assistance [  ] N/A [  x]  SEE PT/OT IE NOTES    LABS:                        13.4   7.91  )-----------( 260      ( 27 May 2022 05:37 )             42.6     05-27    143  |  104  |  26<H>  ----------------------------<  99  4.6   |  25  |  0.6<L>    Ca    9.3      27 May 2022 05:37  Phos  4.5     05-27  Mg     2.2     05-27    TPro  6.4  /  Alb  4.2  /  TBili  0.3  /  DBili  x   /  AST  20  /  ALT  17  /  AlkPhos  89  05-27    PT/INR - ( 26 May 2022 18:25 )   PT: 12.90 sec;   INR: 1.12 ratio         PTT - ( 26 May 2022 18:25 )  PTT:37.8 sec      RADIOLOGY & ADDITIONAL STUDIES:c< from: CT Brain Stroke Protocol (05.26.22 @ 19:24) >  IMPRESSION:    No CT evidence for acute intracranial hemorrhage, mass effect or   territorial infarction.      < end of copied text >      Assesment:

## 2022-05-28 LAB
ANION GAP SERPL CALC-SCNC: 9 MMOL/L — SIGNIFICANT CHANGE UP (ref 7–14)
BUN SERPL-MCNC: 17 MG/DL — SIGNIFICANT CHANGE UP (ref 10–20)
CALCIUM SERPL-MCNC: 8.7 MG/DL — SIGNIFICANT CHANGE UP (ref 8.5–10.1)
CHLORIDE SERPL-SCNC: 104 MMOL/L — SIGNIFICANT CHANGE UP (ref 98–110)
CO2 SERPL-SCNC: 26 MMOL/L — SIGNIFICANT CHANGE UP (ref 17–32)
CREAT SERPL-MCNC: 0.5 MG/DL — LOW (ref 0.7–1.5)
EGFR: 99 ML/MIN/1.73M2 — SIGNIFICANT CHANGE UP
GLUCOSE SERPL-MCNC: 103 MG/DL — HIGH (ref 70–99)
HCT VFR BLD CALC: 39.8 % — SIGNIFICANT CHANGE UP (ref 37–47)
HGB BLD-MCNC: 12.8 G/DL — SIGNIFICANT CHANGE UP (ref 12–16)
MCHC RBC-ENTMCNC: 28.4 PG — SIGNIFICANT CHANGE UP (ref 27–31)
MCHC RBC-ENTMCNC: 32.2 G/DL — SIGNIFICANT CHANGE UP (ref 32–37)
MCV RBC AUTO: 88.2 FL — SIGNIFICANT CHANGE UP (ref 81–99)
NRBC # BLD: 0 /100 WBCS — SIGNIFICANT CHANGE UP (ref 0–0)
PLATELET # BLD AUTO: 240 K/UL — SIGNIFICANT CHANGE UP (ref 130–400)
POTASSIUM SERPL-MCNC: 4.2 MMOL/L — SIGNIFICANT CHANGE UP (ref 3.5–5)
POTASSIUM SERPL-SCNC: 4.2 MMOL/L — SIGNIFICANT CHANGE UP (ref 3.5–5)
RBC # BLD: 4.51 M/UL — SIGNIFICANT CHANGE UP (ref 4.2–5.4)
RBC # FLD: 13.9 % — SIGNIFICANT CHANGE UP (ref 11.5–14.5)
SODIUM SERPL-SCNC: 139 MMOL/L — SIGNIFICANT CHANGE UP (ref 135–146)
TSH SERPL-MCNC: 1.31 UIU/ML — SIGNIFICANT CHANGE UP (ref 0.27–4.2)
WBC # BLD: 8.37 K/UL — SIGNIFICANT CHANGE UP (ref 4.8–10.8)
WBC # FLD AUTO: 8.37 K/UL — SIGNIFICANT CHANGE UP (ref 4.8–10.8)

## 2022-05-28 PROCEDURE — 99222 1ST HOSP IP/OBS MODERATE 55: CPT

## 2022-05-28 PROCEDURE — 99232 SBSQ HOSP IP/OBS MODERATE 35: CPT

## 2022-05-28 RX ADMIN — CHLORHEXIDINE GLUCONATE 1 APPLICATION(S): 213 SOLUTION TOPICAL at 05:37

## 2022-05-28 RX ADMIN — CHLORHEXIDINE GLUCONATE 1 APPLICATION(S): 213 SOLUTION TOPICAL at 17:34

## 2022-05-28 RX ADMIN — HEPARIN SODIUM 5000 UNIT(S): 5000 INJECTION INTRAVENOUS; SUBCUTANEOUS at 05:37

## 2022-05-28 RX ADMIN — ATORVASTATIN CALCIUM 80 MILLIGRAM(S): 80 TABLET, FILM COATED ORAL at 21:46

## 2022-05-28 RX ADMIN — HEPARIN SODIUM 5000 UNIT(S): 5000 INJECTION INTRAVENOUS; SUBCUTANEOUS at 17:34

## 2022-05-28 RX ADMIN — Medication 81 MILLIGRAM(S): at 12:28

## 2022-05-28 NOTE — CONSULT NOTE ADULT - ASSESSMENT
72 yo F with PMH of GERD, COPD, HLD, TIAs presenting for R facial numbness that started 30 minutes PTA.  Patient also endorses R hand tingling and numbness that started yesterday    Recommendations:  - continue secondary stroke prevention with ASA, Lipitor  - MRI Brain NC  - MRA Head NC  - MRA Neck w/ elvira  - OT eval and treat  - continue neurochecks Q4hrs  - continue telemetry  - Echo   70 yo F with PMH of GERD, COPD, HLD, w/ prior transient R hand numbness/weakness now with persistent R  weakness with R face numbness and ? transient ptosis suspicious for central etiology.      Recommendations:  - continue secondary stroke prevention with ASA, Lipitor  - MRI Brain w/w/o elvira  - MRA Head NC  - MRA Neck w/ elvira  - MRI C-spine w/w/o elvira  - will need MRI under general anesthesia since pt severely claustrophobic  - OT eval and treat  - continue neurochecks Q4hrs  - continue telemetry  - Echo  - discussed with ICU attending who will arrange for MR under anesthesia

## 2022-05-28 NOTE — PROGRESS NOTE ADULT - SUBJECTIVE AND OBJECTIVE BOX
CRISTINA STRANGE  71y, Female  Allergy: succinylcholine (Other)      CHIEF COMPLAINT: TIA r/o CVA (27 May 2022 10:32)      HPI:   Patient is a 71y old  Female who presents with a chief complaint of mouth and right hand tingling worse now.  Pt was at hand specialist this am     Today:  Patient seen and examined at bedside; reports she went for MRI brain but proper Ativan was not given to her prior to the test. She denies any new complaints.     ROS  General: Denies fevers, chills, nightsweats, weight loss  HEENT: Denies headache, rhinorrhea, sore throat, eye pain  CV: Denies CP, palpitations  PULM: Denies SOB, cough  GI: Denies abdominal pain, diarrhea  : Denies dysuria, hematuria  MSK: Denies arthralgias  SKIN: Denies rash   NEURO: Denies paresthesias, weakness  PSYCH: Denies depression    VITALS:  ICU Vital Signs Last 24 Hrs  T(C): 36.7 (28 May 2022 08:06), Max: 36.7 (28 May 2022 08:06)  T(F): 98 (28 May 2022 08:06), Max: 98 (28 May 2022 08:06)  HR: 88 (28 May 2022 08:06) (73 - 98)  BP: 154/67 (28 May 2022 08:06) (128/58 - 193/87)  BP(mean): 97 (28 May 2022 07:38) (84 - 125)  ABP: --  ABP(mean): --  RR: 28 (28 May 2022 08:06) (17 - 28)  SpO2: --      PHYSICAL EXAM:  Gen: NAD, resting in bed  HEENT: Normocephalic, atraumatic  Neck: supple, no lymphadenopathy  CV: Regular rate & regular rhythm, NO LE edmea  Lungs: decreased BS at bases, no fremitus, on room air  Abdomen: Soft, BS present  Ext: Warm, well perfused  Neuro: non focal, awake      TESTS & MEASUREMENTS:                        12.8   8.37  )-----------( 240      ( 28 May 2022 06:04 )             39.8   05-28    139  |  104  |  17  ----------------------------<  103<H>  4.2   |  26  |  0.5<L>    Ca    8.7      28 May 2022 06:04  Phos  4.5     05-27  Mg     2.2     05-27    TPro  6.4  /  Alb  4.2  /  TBili  0.3  /  DBili  x   /  AST  20  /  ALT  17  /  AlkPhos  89  05-27          QRS axis to [] ° and NSR at a rate of [] BPM. There was no atrial enlargement. There was no ventricular hypertrophy. There were no ST-T changes and all intervals were normal.      INFECTIOUS DISEASES TESTING      RADIOLOGY & ADDITIONAL TESTS:  I have personally reviewed the last Chest xray  CXR      CT      CARDIOLOGY TESTING      MEDICATIONS  (STANDING):  aspirin  chewable 81 milliGRAM(s) Oral daily  atorvastatin 80 milliGRAM(s) Oral at bedtime  chlorhexidine 4% Liquid 1 Application(s) Topical two times a day  heparin   Injectable 5000 Unit(s) SubCutaneous every 12 hours  LORazepam   Injectable 2 milliGRAM(s) IV Push once    MEDICATIONS  (PRN):  LORazepam   Injectable 1 milliGRAM(s) IV Push once PRN Additional, second dose to be given if patient remains anxious during MRI after receiving first 2mg IV push lorazepam dose      All available historical data has been reviewed    ASSESSMENT  71y F admitted with FACIAL NUMBNESS        PROBLEMS

## 2022-05-28 NOTE — CONSULT NOTE ADULT - SUBJECTIVE AND OBJECTIVE BOX
Neurology Consult    Patient is a 71y old  Female who presents with a chief complaint of TIA r/o CVA (27 May 2022 20:59)      HPI:   Patient is a 70 yo F with PMH of GERD, COPD, HLD, TIAs presenting for R facial numbness that started 30 minutes PTA.  Patient also endorses R hand tingling and numbness that started yesterday. Denies fever, cough, congestion, HT, LOC, AC, drug/alcohol use, vision changes, dizziness, CP, SOB, NVD, dysuria.Pt was at hand specialist earlier day of admission.  Stroke code called as per Dr. Bolivar from telestroke not a TP A candidate as hand >24 hours and numbness to face NIH 1, not candidate for acute intervention.  (26 May 2022 23:42)    PAST MEDICAL & SURGICAL HISTORY:  GERD (gastroesophageal reflux disease)  History of TIAs  No significant past surgical history    FAMILY HISTORY:  No pertinent family history in first degree relatives    Social History: (-) x 3    Allergies    succinylcholine (Other)    Intolerances    MEDICATIONS  (STANDING):  aspirin  chewable 81 milliGRAM(s) Oral daily  atorvastatin 80 milliGRAM(s) Oral at bedtime  chlorhexidine 4% Liquid 1 Application(s) Topical two times a day  heparin   Injectable 5000 Unit(s) SubCutaneous every 12 hours  LORazepam   Injectable 2 milliGRAM(s) IV Push once    MEDICATIONS  (PRN):  LORazepam   Injectable 1 milliGRAM(s) IV Push once PRN Additional, second dose to be given if patient remains anxious during MRI after receiving first 2mg IV push lorazepam dose    Review of systems:    Constitutional: as per HPI  Eyes: No eye pain or discharge  ENMT:  No difficulty hearing; No sinus or throat pain  Neck: No pain or stiffness  Respiratory: No cough, wheezing, chills or hemoptysis  Cardiovascular: No chest pain, palpitations, shortness of breath, dyspnea on exertion  Gastrointestinal: No abdominal pain, nausea, vomiting or hematemesis; No diarrhea or constipation.   Genitourinary: No dysuria, frequency, hematuria or incontinence  Neurological: As per HPI  Skin: No rashes or lesions   Endocrine: No heat or cold intolerance; No hair loss  Musculoskeletal: No joint pain or swelling  Psychiatric: No depression, anxiety, mood swings  Heme/Lymph: No easy bruising or bleeding gums    Vital Signs Last 24 Hrs  T(C): 36.7 (28 May 2022 08:06), Max: 36.7 (28 May 2022 08:06)  T(F): 98 (28 May 2022 08:06), Max: 98 (28 May 2022 08:06)  HR: 88 (28 May 2022 08:06) (73 - 98)  BP: 154/67 (28 May 2022 08:06) (128/58 - 193/87)  BP(mean): 97 (28 May 2022 07:38) (84 - 125)  RR: 28 (28 May 2022 08:06) (17 - 28)  SpO2: --    Examination:  General:  Appearance is consistent with chronologic age.  No abnormal facies.  Gross skin survey within normal limits.    Cognitive/Language:  The patient is oriented to person, place, time and date.  Recent and remote memory intact.  Fund of knowledge is intact and normal.  Language with normal repetition, comprehension and naming.  Nondysarthric.    Eyes: intact VA, VFF.  EOMI w/o nystagmus, skew or reported double vision.  PERRL.  No ptosis/weakness of eyelid closure.    Face:  Facial sensation normal V1 - 3, no facial asymmetry.    Ears/Nose/Throat:  Hearing grossly intact b/l.  Palate elevates midline.  Tongue and uvula midline.   Motor examination:   Normal tone, bulk and range of motion.  No tenderness, twitching, tremors or involuntary movements.  Formal Muscle Strength Testing: (MRC grade R/L) 5/5 UE; 5/5 LE.  No observable drift.  Reflexes:   2+ b/l pectoralis, biceps, triceps, brachioradialis, patella and Achilles.  Plantar response downgoing b/l.  Jaw jerk, Thania, clonus absent.  Sensory examination:   Intact to light touch and pinprick, pain, temperature and proprioception and vibration in all extremities.  Cerebellum:   FTN/HKS intact with normal JOYCE in all limbs.  No dysmetria or dysdiadokinesia.  Gait narrow based and normal.    Labs:   CBC Full  -  ( 28 May 2022 06:04 )  WBC Count : 8.37 K/uL  RBC Count : 4.51 M/uL  Hemoglobin : 12.8 g/dL  Hematocrit : 39.8 %  Platelet Count - Automated : 240 K/uL  Mean Cell Volume : 88.2 fL  Mean Cell Hemoglobin : 28.4 pg  Mean Cell Hemoglobin Concentration : 32.2 g/dL    05-28    139  |  104  |  17  ----------------------------<  103<H>  4.2   |  26  |  0.5<L>    Ca    8.7      28 May 2022 06:04  Phos  4.5     05-27  Mg     2.2     05-27    TPro  6.4  /  Alb  4.2  /  TBili  0.3  /  DBili  x   /  AST  20  /  ALT  17  /  AlkPhos  89  05-27    LIVER FUNCTIONS - ( 27 May 2022 05:37 )  Alb: 4.2 g/dL / Pro: 6.4 g/dL / ALK PHOS: 89 U/L / ALT: 17 U/L / AST: 20 U/L / GGT: x           PT/INR - ( 26 May 2022 18:25 )   PT: 12.90 sec;   INR: 1.12 ratio       PTT - ( 26 May 2022 18:25 )  PTT:37.8 sec  < from: CT Brain Stroke Protocol (05.26.22 @ 19:24) >  IMPRESSION:    No CT evidence for acute intracranial hemorrhage, mass effect or   territorial infarction.    --- End of Report ---    ELLIOT LANDAU MD; Attending Radiologist  This document has been electronically signed. May 26 2022  7:28PM    < end of copied text >    < from: CT Cervical Spine No Cont (05.27.22 @ 21:28) >    IMPRESSION:    Degenerative changes as described above. No evidence of fracture or facet   subluxation.    Further evaluation may be obtained with MRI of the cervical spine.    --- End of Report ---      VANE MAIN MD; Attending InterventionalRadiologist  This document has been electronically signed. May 27 2022 10:13PM    < end of copied text >   Neurology Consult    Patient is a 71y old  Female who presents with a chief complaint of TIA r/o CVA (27 May 2022 20:59)      HPI:   Patient is a 72 yo F with PMH of GERD, COPD, HLD, TIAs presenting for R facial numbness that started 30 minutes PTA.  Patient also endorses R hand tingling and numbness that started yesterday. Denies fever, cough, congestion, HT, LOC, AC, drug/alcohol use, vision changes, dizziness, CP, SOB, NVD, dysuria.  Stroke code called as per Dr. Bolivar from telestroke not a TP A candidate as hand >24 hours and numbness to face NIH 1, not candidate for acute intervention.  Has had R hand weakness/numbness on/off over the last year typically lasting few days w/ spontaneous resolution.  Was seen in [facility] 1 year ago with negative HCT diagnosed as possible TIA but never had MRI due to severe claustrophobia.  Had 2 other recurrences with most recent last week seen by Dr. Caldwell who performed a carotid duplex with showed a carotid "branch" flow abnormality.  Was seen by vascular surgery yesterday who confirmed flow abnormality but no intervention.  While going home noted numbness in the R periorbital area followed by mild ptosis OD noted by family.  currently ptosis resolved but R hand weakness and face numbness persists.  Had gone to chiropractor to "fix hand' and had some neck manipulation without any change to symptoms.    (26 May 2022 23:42)    PAST MEDICAL & SURGICAL HISTORY:  GERD (gastroesophageal reflux disease)  History of TIAs  No significant past surgical history    FAMILY HISTORY:  No pertinent family history in first degree relatives    Social History: (-) x 3    Allergies    succinylcholine (Other)    Intolerances    MEDICATIONS  (STANDING):  aspirin  chewable 81 milliGRAM(s) Oral daily  atorvastatin 80 milliGRAM(s) Oral at bedtime  chlorhexidine 4% Liquid 1 Application(s) Topical two times a day  heparin   Injectable 5000 Unit(s) SubCutaneous every 12 hours  LORazepam   Injectable 2 milliGRAM(s) IV Push once    MEDICATIONS  (PRN):  LORazepam   Injectable 1 milliGRAM(s) IV Push once PRN Additional, second dose to be given if patient remains anxious during MRI after receiving first 2mg IV push lorazepam dose    Review of systems:    Constitutional: as per HPI  Eyes: No eye pain or discharge  ENMT:  No difficulty hearing; No sinus or throat pain  Neck: No pain or stiffness  Respiratory: No cough, wheezing, chills or hemoptysis  Cardiovascular: No chest pain, palpitations, shortness of breath, dyspnea on exertion  Gastrointestinal: No abdominal pain, nausea, vomiting or hematemesis; No diarrhea or constipation.   Genitourinary: No dysuria, frequency, hematuria or incontinence  Neurological: As per HPI  Skin: No rashes or lesions   Endocrine: No heat or cold intolerance; No hair loss  Musculoskeletal: No joint pain or swelling  Psychiatric: No depression, anxiety, mood swings  Heme/Lymph: No easy bruising or bleeding gums    Vital Signs Last 24 Hrs  T(C): 36.7 (28 May 2022 08:06), Max: 36.7 (28 May 2022 08:06)  T(F): 98 (28 May 2022 08:06), Max: 98 (28 May 2022 08:06)  HR: 88 (28 May 2022 08:06) (73 - 98)  BP: 154/67 (28 May 2022 08:06) (128/58 - 193/87)  BP(mean): 97 (28 May 2022 07:38) (84 - 125)  RR: 28 (28 May 2022 08:06) (17 - 28)  SpO2: --    Examination:  General:  Appearance is consistent with chronologic age.  No abnormal facies.  Gross skin survey within normal limits.    Cognitive/Language:  The patient is oriented to person, place, time and date.  Recent and remote memory intact.  Fund of knowledge is intact and normal.  Language with normal repetition, comprehension and naming.  Nondysarthric.    Eyes: intact VA, VFF.  EOMI w/o nystagmus, skew or reported double vision.  PERRL.  No ptosis/weakness of eyelid closure.    Face:  Facial sensation normal V1 - 3, no facial asymmetry.    Ears/Nose/Throat:  Hearing grossly intact b/l.  Palate elevates midline.  Tongue and uvula midline.   Motor examination:   Normal tone, bulk and range of motion.  No tenderness, twitching, tremors or involuntary movements.  Formal Muscle Strength Testing: (MRC grade R/L) 5/5 UE; 3/5 R  with incoordination with fine movements. 5/5 LE.  No observable drift.  Reflexes:   3+/2+ pectoralis, biceps, triceps, brachioradialis, patella and Achilles.  (+) Wilde/finger flexors b/l hands.  Plantar response downgoing b/l.  Jaw jerk, Thania, clonus absent.  Sensory examination:   Intact to light touch and pinprick, pain, temperature and proprioception and vibration in all extremities and face  Cerebellum:   FTN/HKS intact with normal JOYCE in all limbs.  No dysmetria or dysdiadokinesia.  Gait narrow based and normal.    NIHSS 1    Labs:   CBC Full  -  ( 28 May 2022 06:04 )  WBC Count : 8.37 K/uL  RBC Count : 4.51 M/uL  Hemoglobin : 12.8 g/dL  Hematocrit : 39.8 %  Platelet Count - Automated : 240 K/uL  Mean Cell Volume : 88.2 fL  Mean Cell Hemoglobin : 28.4 pg  Mean Cell Hemoglobin Concentration : 32.2 g/dL    05-28    139  |  104  |  17  ----------------------------<  103<H>  4.2   |  26  |  0.5<L>    Ca    8.7      28 May 2022 06:04  Phos  4.5     05-27  Mg     2.2     05-27    TPro  6.4  /  Alb  4.2  /  TBili  0.3  /  DBili  x   /  AST  20  /  ALT  17  /  AlkPhos  89  05-27    LIVER FUNCTIONS - ( 27 May 2022 05:37 )  Alb: 4.2 g/dL / Pro: 6.4 g/dL / ALK PHOS: 89 U/L / ALT: 17 U/L / AST: 20 U/L / GGT: x           PT/INR - ( 26 May 2022 18:25 )   PT: 12.90 sec;   INR: 1.12 ratio       PTT - ( 26 May 2022 18:25 )  PTT:37.8 sec  < from: CT Brain Stroke Protocol (05.26.22 @ 19:24) >  IMPRESSION:    No CT evidence for acute intracranial hemorrhage, mass effect or   territorial infarction.    --- End of Report ---    ELLIOT LANDAU MD; Attending Radiologist  This document has been electronically signed. May 26 2022  7:28PM    < end of copied text >    < from: CT Cervical Spine No Cont (05.27.22 @ 21:28) >    IMPRESSION:    Degenerative changes as described above. No evidence of fracture or facet   subluxation.    Further evaluation may be obtained with MRI of the cervical spine.    --- End of Report ---      VANE MAIN MD; Attending InterventionalRadiologist  This document has been electronically signed. May 27 2022 10:13PM    < end of copied text >

## 2022-05-28 NOTE — PROGRESS NOTE ADULT - ASSESSMENT
70 yo F with PMH of GERD, COPD, HLD, TIAs presenting for R facial numbness that started 30 minutes PTA.  Patient also endorses R hand tingling and numbness that started yesterday.    # Right face and arm numbness after manipulation from chiropractor, r/o CVA  # hx of TIA in past   - Neuro on board  - c/w ASA, Lipitor  - MRI Brain NC;  MRA Head NC; MRA Neck w/ elvira- IV ativan 2mg to be given prior to MRI  - pt is very claustrophobic - she is willing to try MRI with 2mg ativan - ordered  - OT eval and treat  - continue neurochecks Q4hrs  - f/u Echo    Dispo: remains in SDU

## 2022-05-29 LAB
ANION GAP SERPL CALC-SCNC: 10 MMOL/L — SIGNIFICANT CHANGE UP (ref 7–14)
BUN SERPL-MCNC: 20 MG/DL — SIGNIFICANT CHANGE UP (ref 10–20)
CALCIUM SERPL-MCNC: 9.6 MG/DL — SIGNIFICANT CHANGE UP (ref 8.5–10.1)
CHLORIDE SERPL-SCNC: 105 MMOL/L — SIGNIFICANT CHANGE UP (ref 98–110)
CO2 SERPL-SCNC: 27 MMOL/L — SIGNIFICANT CHANGE UP (ref 17–32)
CREAT SERPL-MCNC: 0.7 MG/DL — SIGNIFICANT CHANGE UP (ref 0.7–1.5)
EGFR: 91 ML/MIN/1.73M2 — SIGNIFICANT CHANGE UP
GLUCOSE SERPL-MCNC: 92 MG/DL — SIGNIFICANT CHANGE UP (ref 70–99)
HCT VFR BLD CALC: 42.6 % — SIGNIFICANT CHANGE UP (ref 37–47)
HGB BLD-MCNC: 13.3 G/DL — SIGNIFICANT CHANGE UP (ref 12–16)
MAGNESIUM SERPL-MCNC: 2 MG/DL — SIGNIFICANT CHANGE UP (ref 1.8–2.4)
MCHC RBC-ENTMCNC: 28.1 PG — SIGNIFICANT CHANGE UP (ref 27–31)
MCHC RBC-ENTMCNC: 31.2 G/DL — LOW (ref 32–37)
MCV RBC AUTO: 90.1 FL — SIGNIFICANT CHANGE UP (ref 81–99)
NRBC # BLD: 0 /100 WBCS — SIGNIFICANT CHANGE UP (ref 0–0)
PLATELET # BLD AUTO: 258 K/UL — SIGNIFICANT CHANGE UP (ref 130–400)
POTASSIUM SERPL-MCNC: 4.6 MMOL/L — SIGNIFICANT CHANGE UP (ref 3.5–5)
POTASSIUM SERPL-SCNC: 4.6 MMOL/L — SIGNIFICANT CHANGE UP (ref 3.5–5)
RBC # BLD: 4.73 M/UL — SIGNIFICANT CHANGE UP (ref 4.2–5.4)
RBC # FLD: 14.1 % — SIGNIFICANT CHANGE UP (ref 11.5–14.5)
SODIUM SERPL-SCNC: 142 MMOL/L — SIGNIFICANT CHANGE UP (ref 135–146)
WBC # BLD: 7.51 K/UL — SIGNIFICANT CHANGE UP (ref 4.8–10.8)
WBC # FLD AUTO: 7.51 K/UL — SIGNIFICANT CHANGE UP (ref 4.8–10.8)

## 2022-05-29 PROCEDURE — 99232 SBSQ HOSP IP/OBS MODERATE 35: CPT

## 2022-05-29 RX ADMIN — CHLORHEXIDINE GLUCONATE 1 APPLICATION(S): 213 SOLUTION TOPICAL at 17:49

## 2022-05-29 RX ADMIN — HEPARIN SODIUM 5000 UNIT(S): 5000 INJECTION INTRAVENOUS; SUBCUTANEOUS at 17:48

## 2022-05-29 RX ADMIN — CHLORHEXIDINE GLUCONATE 1 APPLICATION(S): 213 SOLUTION TOPICAL at 06:27

## 2022-05-29 RX ADMIN — ATORVASTATIN CALCIUM 80 MILLIGRAM(S): 80 TABLET, FILM COATED ORAL at 21:16

## 2022-05-29 RX ADMIN — HEPARIN SODIUM 5000 UNIT(S): 5000 INJECTION INTRAVENOUS; SUBCUTANEOUS at 06:27

## 2022-05-29 RX ADMIN — Medication 81 MILLIGRAM(S): at 11:43

## 2022-05-29 NOTE — PROGRESS NOTE ADULT - SUBJECTIVE AND OBJECTIVE BOX
CRISTINA STRANGE  71y, Female  Allergy: succinylcholine (Other)      CHIEF COMPLAINT: TIA r/o CVA (27 May 2022 10:32)      HPI:   Patient is a 71y old  Female who presents with a chief complaint of mouth and right hand tingling worse now.  Pt was at hand specialist this am     Today:  Patient seen and examined at bedside; reports she will only do MRI under general anesthesia due to severe claustrophobia. She denies any new complaints. Reports of right  weakness and right face numbness.    ROS  General: Denies fevers, chills, nightsweats, weight loss  HEENT: Denies headache, rhinorrhea, sore throat, eye pain  CV: Denies CP, palpitations  PULM: Denies SOB, cough  GI: Denies abdominal pain, diarrhea  : Denies dysuria, hematuria  MSK: Denies arthralgias  SKIN: Denies rash   NEURO: Denies paresthesias, weakness  PSYCH: Denies depression    VITALS:  ICU Vital Signs Last 24 Hrs  T(C): 36.9 (29 May 2022 08:00), Max: 36.9 (29 May 2022 08:00)  T(F): 98.4 (29 May 2022 08:00), Max: 98.4 (29 May 2022 08:00)  HR: 77 (29 May 2022 08:00) (68 - 94)  BP: 178/74 (29 May 2022 08:00) (135/60 - 188/79)  BP(mean): 106 (29 May 2022 07:30) (87 - 113)  ABP: --  ABP(mean): --  RR: 18 (29 May 2022 08:00) (17 - 27)  SpO2: --    PHYSICAL EXAM:  Gen: NAD, resting in bed  HEENT: Normocephalic, atraumatic  Neck: supple, no lymphadenopathy  CV: Regular rate & regular rhythm, NO LE edmea  Lungs: decreased BS at bases, no fremitus, on room air  Abdomen: Soft, BS present  Ext: Warm, well perfuse; Right  weakness  Neuro: non focal, awake      TESTS & MEASUREMENTS:                        13.3   7.51  )-----------( 258      ( 29 May 2022 06:05 )             42.6   05-29    142  |  105  |  20  ----------------------------<  92  4.6   |  27  |  0.7    Ca    9.6      29 May 2022 06:05  Mg     2.0     05-29        QRS axis to [] ° and NSR at a rate of [] BPM. There was no atrial enlargement. There was no ventricular hypertrophy. There were no ST-T changes and all intervals were normal.      INFECTIOUS DISEASES TESTING      RADIOLOGY & ADDITIONAL TESTS:  I have personally reviewed the last Chest xray  CXR      CT      CARDIOLOGY TESTING      MEDICATIONS  (STANDING):  aspirin  chewable 81 milliGRAM(s) Oral daily  atorvastatin 80 milliGRAM(s) Oral at bedtime  chlorhexidine 4% Liquid 1 Application(s) Topical two times a day  heparin   Injectable 5000 Unit(s) SubCutaneous every 12 hours  LORazepam   Injectable 2 milliGRAM(s) IV Push once    MEDICATIONS  (PRN):  LORazepam   Injectable 1 milliGRAM(s) IV Push once PRN Additional, second dose to be given if patient remains anxious during MRI after receiving first 2mg IV push lorazepam dose      All available historical data has been reviewed    ASSESSMENT  71y F admitted with FACIAL NUMBNESS        PROBLEMS

## 2022-05-29 NOTE — PROGRESS NOTE ADULT - SUBJECTIVE AND OBJECTIVE BOX
· Subjective and Objective:   CRISTINA STRANGE  71y, Female  Allergy: succinylcholine (Other)      CHIEF COMPLAINT: TIA r/o CVA (27 May 2022 10:32)      HPI:   Patient is a 71y old  Female who presents with a chief complaint of mouth and right hand tingling worse now.  Pt was at hand specialist this am     Today:  Patient seen and examined at bedside; reports she will only do MRI under general anesthesia due to severe claustrophobia. She denies any new complaints. Reports of right  weakness and right face numbness.    ROS  General: Denies fevers, chills, nightsweats, weight loss  HEENT: Denies headache, rhinorrhea, sore throat, eye pain  CV: Denies CP, palpitations  PULM: Denies SOB, cough  GI: Denies abdominal pain, diarrhea  : Denies dysuria, hematuria  MSK: Denies arthralgias  SKIN: Denies rash   NEURO: Denies paresthesias, weakness  PSYCH: Denies depression    VITALS:  ICU Vital Signs Last 24 Hrs  T(C): 36.9 (29 May 2022 08:00), Max: 36.9 (29 May 2022 08:00)  T(F): 98.4 (29 May 2022 08:00), Max: 98.4 (29 May 2022 08:00)  HR: 77 (29 May 2022 08:00) (68 - 94)  BP: 178/74 (29 May 2022 08:00) (135/60 - 188/79)  BP(mean): 106 (29 May 2022 07:30) (87 - 113)  ABP: --  ABP(mean): --  RR: 18 (29 May 2022 08:00) (17 - 27)  SpO2: --    PHYSICAL EXAM:  Gen: NAD, resting in bed  HEENT: Normocephalic, atraumatic  Neck: supple, no lymphadenopathy  CV: Regular rate & regular rhythm, NO LE edmea  Lungs: decreased BS at bases, no fremitus, on room air  Abdomen: Soft, BS present  Ext: Warm, well perfuse; Right  weakness  Neuro: non focal, awake      TESTS & MEASUREMENTS:                        13.3   7.51  )-----------( 258      ( 29 May 2022 06:05 )             42.6   05-29    142  |  105  |  20  ----------------------------<  92  4.6   |  27  |  0.7    Ca    9.6      29 May 2022 06:05  Mg     2.0     05-29        QRS axis to [] ° and NSR at a rate of [] BPM. There was no atrial enlargement. There was no ventricular hypertrophy. There were no ST-T changes and all intervals were normal.      INFECTIOUS DISEASES TESTING      RADIOLOGY & ADDITIONAL TESTS:  I have personally reviewed the last Chest xray  CXR      CT      CARDIOLOGY TESTING      MEDICATIONS  (STANDING):  aspirin  chewable 81 milliGRAM(s) Oral daily  atorvastatin 80 milliGRAM(s) Oral at bedtime  chlorhexidine 4% Liquid 1 Application(s) Topical two times a day  heparin   Injectable 5000 Unit(s) SubCutaneous every 12 hours  LORazepam   Injectable 2 milliGRAM(s) IV Push once    MEDICATIONS  (PRN):  LORazepam   Injectable 1 milliGRAM(s) IV Push once PRN Additional, second dose to be given if patient remains anxious during MRI after receiving first 2mg IV push lorazepam dose      All available historical data has been reviewed    ASSESSMENT  71y F admitted with FACIAL NUMBNESS        PROBLEMS      Assessment and Plan:   · Assessment	  72 yo F with PMH of GERD, COPD, HLD, TIAs presenting for R facial numbness that started 30 minutes PTA.  Patient also endorses R hand tingling and numbness that started yesterday.    # Right face and arm numbness after manipulation from chiropractor, r/o CVA  # hx of TIA in past   - Neuro on board  - c/w ASA, Lipitor  - f/u MRI Brain w/w/o elvira; MRA Head NC; MRA Neck w/ elvira; MRI C-spine w/w/o elvira; patient will need MRI under general anesthesia since pt severely claustrophobic  - OT eval and treat  - continue neurochecks Q4hrs  - f/u Echo    Dispo: remains in SDU; case discussed with ICU resident

## 2022-05-29 NOTE — PROGRESS NOTE ADULT - ASSESSMENT
70 yo F with PMH of GERD, COPD, HLD, TIAs presenting for R facial numbness that started 30 minutes PTA.  Patient also endorses R hand tingling and numbness that started yesterday.    # Right face and arm numbness after manipulation from chiropractor, r/o CVA  # hx of TIA in past   - Neuro on board  - c/w ASA, Lipitor  - f/u MRI Brain w/w/o elvira; MRA Head NC; MRA Neck w/ elvira; MRI C-spine w/w/o elvira; patient will need MRI under general anesthesia since pt severely claustrophobic  - OT eval and treat  - continue neurochecks Q4hrs  - f/u Echo    Dispo: remains in SDU; case discussed with ICU resident

## 2022-05-30 LAB
ANION GAP SERPL CALC-SCNC: 15 MMOL/L — HIGH (ref 7–14)
APPEARANCE UR: CLEAR — SIGNIFICANT CHANGE UP
BILIRUB UR-MCNC: NEGATIVE — SIGNIFICANT CHANGE UP
BUN SERPL-MCNC: 22 MG/DL — HIGH (ref 10–20)
CALCIUM SERPL-MCNC: 9.4 MG/DL — SIGNIFICANT CHANGE UP (ref 8.5–10.1)
CHLORIDE SERPL-SCNC: 103 MMOL/L — SIGNIFICANT CHANGE UP (ref 98–110)
CO2 SERPL-SCNC: 25 MMOL/L — SIGNIFICANT CHANGE UP (ref 17–32)
COLOR SPEC: YELLOW — SIGNIFICANT CHANGE UP
CREAT SERPL-MCNC: 0.6 MG/DL — LOW (ref 0.7–1.5)
DIFF PNL FLD: NEGATIVE — SIGNIFICANT CHANGE UP
EGFR: 95 ML/MIN/1.73M2 — SIGNIFICANT CHANGE UP
GLUCOSE SERPL-MCNC: 102 MG/DL — HIGH (ref 70–99)
GLUCOSE UR QL: NEGATIVE MG/DL — SIGNIFICANT CHANGE UP
HCT VFR BLD CALC: 41.5 % — SIGNIFICANT CHANGE UP (ref 37–47)
HGB BLD-MCNC: 13.2 G/DL — SIGNIFICANT CHANGE UP (ref 12–16)
KETONES UR-MCNC: ABNORMAL
LEUKOCYTE ESTERASE UR-ACNC: NEGATIVE — SIGNIFICANT CHANGE UP
MAGNESIUM SERPL-MCNC: 1.9 MG/DL — SIGNIFICANT CHANGE UP (ref 1.8–2.4)
MCHC RBC-ENTMCNC: 28.3 PG — SIGNIFICANT CHANGE UP (ref 27–31)
MCHC RBC-ENTMCNC: 31.8 G/DL — LOW (ref 32–37)
MCV RBC AUTO: 89.1 FL — SIGNIFICANT CHANGE UP (ref 81–99)
NITRITE UR-MCNC: NEGATIVE — SIGNIFICANT CHANGE UP
NRBC # BLD: 0 /100 WBCS — SIGNIFICANT CHANGE UP (ref 0–0)
PH UR: 6 — SIGNIFICANT CHANGE UP (ref 5–8)
PLATELET # BLD AUTO: 265 K/UL — SIGNIFICANT CHANGE UP (ref 130–400)
POTASSIUM SERPL-MCNC: 4.4 MMOL/L — SIGNIFICANT CHANGE UP (ref 3.5–5)
POTASSIUM SERPL-SCNC: 4.4 MMOL/L — SIGNIFICANT CHANGE UP (ref 3.5–5)
PROT UR-MCNC: NEGATIVE MG/DL — SIGNIFICANT CHANGE UP
RBC # BLD: 4.66 M/UL — SIGNIFICANT CHANGE UP (ref 4.2–5.4)
RBC # FLD: 14.4 % — SIGNIFICANT CHANGE UP (ref 11.5–14.5)
SODIUM SERPL-SCNC: 143 MMOL/L — SIGNIFICANT CHANGE UP (ref 135–146)
SP GR SPEC: >=1.03 (ref 1.01–1.03)
UROBILINOGEN FLD QL: 0.2 MG/DL — SIGNIFICANT CHANGE UP
WBC # BLD: 8.46 K/UL — SIGNIFICANT CHANGE UP (ref 4.8–10.8)
WBC # FLD AUTO: 8.46 K/UL — SIGNIFICANT CHANGE UP (ref 4.8–10.8)

## 2022-05-30 PROCEDURE — 99232 SBSQ HOSP IP/OBS MODERATE 35: CPT

## 2022-05-30 PROCEDURE — 99291 CRITICAL CARE FIRST HOUR: CPT

## 2022-05-30 RX ORDER — AMLODIPINE BESYLATE 2.5 MG/1
5 TABLET ORAL DAILY
Refills: 0 | Status: DISCONTINUED | OUTPATIENT
Start: 2022-05-30 | End: 2022-06-02

## 2022-05-30 RX ADMIN — AMLODIPINE BESYLATE 5 MILLIGRAM(S): 2.5 TABLET ORAL at 17:32

## 2022-05-30 RX ADMIN — HEPARIN SODIUM 5000 UNIT(S): 5000 INJECTION INTRAVENOUS; SUBCUTANEOUS at 17:32

## 2022-05-30 RX ADMIN — Medication 81 MILLIGRAM(S): at 12:22

## 2022-05-30 RX ADMIN — HEPARIN SODIUM 5000 UNIT(S): 5000 INJECTION INTRAVENOUS; SUBCUTANEOUS at 05:27

## 2022-05-30 RX ADMIN — CHLORHEXIDINE GLUCONATE 1 APPLICATION(S): 213 SOLUTION TOPICAL at 17:31

## 2022-05-30 RX ADMIN — CHLORHEXIDINE GLUCONATE 1 APPLICATION(S): 213 SOLUTION TOPICAL at 05:30

## 2022-05-30 RX ADMIN — ATORVASTATIN CALCIUM 80 MILLIGRAM(S): 80 TABLET, FILM COATED ORAL at 22:28

## 2022-05-30 NOTE — PROGRESS NOTE ADULT - SUBJECTIVE AND OBJECTIVE BOX
CHIEF COMPLAINT: TIA r/o CVA (27 May 2022 10:32)      Today:  Patient seen and examined at bedside; reports she will only do MRI under general anesthesia due to severe claustrophobia. She denies any new complaints. Reports of right  weakness and right face numbness that she states is improving from yesterday.     ROS  General: Denies fevers, chills, nightsweats, weight loss  HEENT: Denies headache, rhinorrhea, sore throat, eye pain  CV: Denies CP, palpitations  PULM: Denies SOB, cough  GI: Denies abdominal pain, diarrhea  : Denies dysuria, hematuria  MSK: Denies arthralgias  SKIN: Denies rash   NEURO: Denies paresthesias, weakness  PSYCH: Denies depression    VITALS:  ICU Vital Signs Last 24 Hrs  T(C): 36.9 (29 May 2022 08:00), Max: 36.9 (29 May 2022 08:00)  T(F): 98.4 (29 May 2022 08:00), Max: 98.4 (29 May 2022 08:00)  HR: 77 (29 May 2022 08:00) (68 - 94)  BP: 178/74 (29 May 2022 08:00) (135/60 - 188/79)  BP(mean): 106 (29 May 2022 07:30) (87 - 113)  ABP: --  ABP(mean): --  RR: 18 (29 May 2022 08:00) (17 - 27)  SpO2: --    PHYSICAL EXAM:  Gen: NAD, resting in bed  HEENT: Normocephalic, atraumatic; ptosis resolved  Neck: supple, no lymphadenopathy  CV: Regular rate & regular rhythm, NO LE edmea  Lungs: decreased BS at bases, no fremitus, on room air  Abdomen: Soft, BS present  Ext: Warm, well perfuse; Right  weakness  Neuro: non focal, awake      TESTS & MEASUREMENTS:                        13.3   7.51  )-----------( 258      ( 29 May 2022 06:05 )             42.6   05-29    142  |  105  |  20  ----------------------------<  92  4.6   |  27  |  0.7    Ca    9.6      29 May 2022 06:05  Mg     2.0     05-29        QRS axis to [] ° and NSR at a rate of [] BPM. There was no atrial enlargement. There was no ventricular hypertrophy. There were no ST-T changes and all intervals were normal.      INFECTIOUS DISEASES TESTING      RADIOLOGY & ADDITIONAL TESTS:  I have personally reviewed the last Chest xray  CXR      CT      CARDIOLOGY TESTING      MEDICATIONS  (STANDING):  aspirin  chewable 81 milliGRAM(s) Oral daily  atorvastatin 80 milliGRAM(s) Oral at bedtime  chlorhexidine 4% Liquid 1 Application(s) Topical two times a day  heparin   Injectable 5000 Unit(s) SubCutaneous every 12 hours  LORazepam   Injectable 2 milliGRAM(s) IV Push once    MEDICATIONS  (PRN):  LORazepam   Injectable 1 milliGRAM(s) IV Push once PRN Additional, second dose to be given if patient remains anxious during MRI after receiving first 2mg IV push lorazepam dose      All available historical data has been reviewed    ASSESSMENT  71y F admitted with FACIAL NUMBNESS        PROBLEMS

## 2022-05-30 NOTE — PROGRESS NOTE ADULT - ASSESSMENT
72 yo F with PMH of GERD, COPD, HLD, w/ prior transient R hand numbness/weakness now with persistent R  weakness with R face numbness and ? transient ptosis suspicious for central etiology.      Recommendations:  - continue secondary stroke prevention with ASA, Lipitor  - MRI Brain w/w/o elvira  - MRA Head NC  - MRA Neck w/ elvira  - MRI C-spine w/w/o elvira  - await MRI under general anesthesia as per patient request  - continue neurochecks Q4hrs  - continue telemetry  - Echo  - discussed with CCU resident

## 2022-05-30 NOTE — PROGRESS NOTE ADULT - SUBJECTIVE AND OBJECTIVE BOX
Patient is a 71y old  Female who presents with a chief complaint of TIA r/o CVA (29 May 2022 17:05)      HPI:   Patient is a 71y old  Female who presents with a chief complaint of mouth and right hand tingling worse now.  Pt was at hand specialist this am      (26 May 2022 23:42)        pt in chair no distress    PAST MEDICAL & SURGICAL HISTORY:  GERD (gastroesophageal reflux disease)      History of TIAs      No significant past surgical history        Allergies    succinylcholine (Other)    Intolerances      FAMILY HISTORY:  No pertinent family history in first degree relatives      Home Medications:    Occupation:  [employer]: Denied  Smoking: Denied  Drug Use: Denied  Marital Status:         ROS: as in HPI; All other systems reviewed are negative    ICU Vital Signs Last 24 Hrs  T(C): 36.4 (30 May 2022 07:00), Max: 36.5 (29 May 2022 15:15)  T(F): 97.6 (30 May 2022 07:00), Max: 97.7 (29 May 2022 15:15)  HR: 81 (30 May 2022 07:00) (71 - 84)  BP: 161/68 (30 May 2022 07:00) (148/70 - 180/75)  BP(mean): 101 (30 May 2022 05:20) (92 - 108)  ABP: --  ABP(mean): --  RR: 18 (30 May 2022 07:00) (18 - 18)  SpO2: 96% (29 May 2022 23:10) (96% - 98%)        Physical Examination:    General: No acute distress.  Alert, oriented, interactive, nonfocal    HEENT: Pupils equal, reactive to light.  Symmetric.    PULM: Clear to auscultation bilaterally, no significant sputum production    CVS: Regular rate and rhythm, no murmurs, rubs, or gallops    ABD: Soft, nondistended, nontender, normoactive bowel sounds, no masses    EXT: No edema, nontender    SKIN: Warm and well perfused, no rashes noted.              I&O's Detail        LABS:                        13.2   8.46  )-----------( 265      ( 30 May 2022 06:02 )             41.5     30 May 2022 06:02    143    |  103    |  22     ----------------------------<  102    4.4     |  25     |  0.6      Ca    9.4        30 May 2022 06:02  Mg     1.9       30 May 2022 06:02            CAPILLARY BLOOD GLUCOSE            Culture        MEDICATIONS  (STANDING):  aspirin  chewable 81 milliGRAM(s) Oral daily  atorvastatin 80 milliGRAM(s) Oral at bedtime  chlorhexidine 4% Liquid 1 Application(s) Topical two times a day  heparin   Injectable 5000 Unit(s) SubCutaneous every 12 hours  LORazepam   Injectable 2 milliGRAM(s) IV Push once    MEDICATIONS  (PRN):  LORazepam   Injectable 1 milliGRAM(s) IV Push once PRN Additional, second dose to be given if patient remains anxious during MRI after receiving first 2mg IV push lorazepam dose

## 2022-05-30 NOTE — PROGRESS NOTE ADULT - ASSESSMENT
IMPRESSION/PLAN:  r/o cva      pt doing well, awaiting MRI, pt will require sedation/general anesthesia due to claustrophobia.  Continue asa, statin, neuro f/u    CNS: awaiting MRI, continue neuro checks    HEENT: oral care    PULMONARY: supplemental O2 prn    CARDIOVASCULAR: maintain normotensive    GI: GI prophylaxis.  Feeding     RENAL: monitor uo    INFECTIOUS DISEASE: off abx    HEMATOLOGICAL:  DVT prophylaxis.    ENDOCRINE:  Follow up FS.  Insulin protocol if needed.    MUSCULOSKELETAL: oob to chair        CRITICAL CARE TIME SPENT: 35 minutes

## 2022-05-30 NOTE — PROGRESS NOTE ADULT - ASSESSMENT
72 yo F with PMH of GERD, COPD, HLD, TIAs presenting for R facial numbness that started 30 minutes PTA.  Patient also endorses R hand tingling and numbness that started yesterday.    # Right face and arm numbness after manipulation from chiropractor, r/o CVA  # hx of TIA in past   - Neuro recs noted  - neuro checks q 4  - telemetry  - TTE  - c/w ASA, Lipitor  - f/u MRI Brain w/w/o elvira; MRA Head NC; MRA Neck w/ elvira; MRI C-spine w/w/o elvira; patient will need MRI under general anesthesia since pt severely claustrophobic  - OT eval and treat  - started amlodipine as pt is hypertensive

## 2022-05-30 NOTE — PROGRESS NOTE ADULT - SUBJECTIVE AND OBJECTIVE BOX
Neurology Progress Note    Interval History:  RUE weakness currently improving with improved  strength.  Recalls that R ptosis started after chiropractor "stimulated" her R anterior neck.  Ptosis resolved.  Face numbness improved.  Awaiting MRI under general anesthesia.      HPI:   Patient is a 71y old  Female who presents with a chief complaint of mouth and right hand tingling worse now.  Pt was at hand specialist this am     PAST MEDICAL & SURGICAL HISTORY:  GERD (gastroesophageal reflux disease)    History of TIAs    No significant past surgical history    Medications:  aspirin  chewable 81 milliGRAM(s) Oral daily  atorvastatin 80 milliGRAM(s) Oral at bedtime  chlorhexidine 4% Liquid 1 Application(s) Topical two times a day  heparin   Injectable 5000 Unit(s) SubCutaneous every 12 hours  LORazepam   Injectable 2 milliGRAM(s) IV Push once  LORazepam   Injectable 1 milliGRAM(s) IV Push once PRN      Vital Signs Last 24 Hrs  T(C): 36.4 (30 May 2022 07:00), Max: 36.5 (29 May 2022 15:15)  T(F): 97.6 (30 May 2022 07:00), Max: 97.7 (29 May 2022 15:15)  HR: 81 (30 May 2022 07:00) (71 - 84)  BP: 161/68 (30 May 2022 07:00) (148/70 - 180/75)  BP(mean): 101 (30 May 2022 05:20) (92 - 108)  RR: 18 (30 May 2022 07:00) (18 - 18)  SpO2: 96% (29 May 2022 23:10) (96% - 98%)    Neurological Exam:   Mental status: Awake, alert and oriented x3.  Recent and remote memory intact.  Naming, repetition and comprehension intact.  Attention/concentration intact.  No dysarthria, no aphasia.  Fund of knowledge appropriate.    Cranial nerves: Pupils equally round and reactive to light, visual fields full, no nystagmus, extraocular muscles intact, V1 through V3 intact bilaterally and symmetric, face symmetric, hearing intact to finger rub, palate elevation symmetric, tongue was midline.  Motor:  MRC grading 5/5 b/l UE/LE.   strength 4/5.  Normal tone and bulk.  No abnormal movements.    Sensation: Intact to light touch, proprioception, and pinprick.   Coordination: No dysmetria on finger-to-nose and heel-to-shin.  No dysdiadokinesia.  Reflexes: 2+ in bilateral UE/LE, downgoing toes bilaterally. (-) Wilde.  Gait: Narrow and steady. No ataxia.  Romberg negative    Labs:  CBC Full  -  ( 30 May 2022 06:02 )  WBC Count : 8.46 K/uL  RBC Count : 4.66 M/uL  Hemoglobin : 13.2 g/dL  Hematocrit : 41.5 %  Platelet Count - Automated : 265 K/uL  Mean Cell Volume : 89.1 fL  Mean Cell Hemoglobin : 28.3 pg  Mean Cell Hemoglobin Concentration : 31.8 g/dL    05-30    143  |  103  |  22<H>  ----------------------------<  102<H>  4.4   |  25  |  0.6<L>    Ca    9.4      30 May 2022 06:02  Mg     1.9     05-30

## 2022-05-31 LAB
ALBUMIN SERPL ELPH-MCNC: 3.7 G/DL — SIGNIFICANT CHANGE UP (ref 3.5–5.2)
ALP SERPL-CCNC: 97 U/L — SIGNIFICANT CHANGE UP (ref 30–115)
ALT FLD-CCNC: 79 U/L — HIGH (ref 0–41)
ANION GAP SERPL CALC-SCNC: 12 MMOL/L — SIGNIFICANT CHANGE UP (ref 7–14)
AST SERPL-CCNC: 74 U/L — HIGH (ref 0–41)
BILIRUB SERPL-MCNC: <0.2 MG/DL — SIGNIFICANT CHANGE UP (ref 0.2–1.2)
BUN SERPL-MCNC: 18 MG/DL — SIGNIFICANT CHANGE UP (ref 10–20)
CALCIUM SERPL-MCNC: 9 MG/DL — SIGNIFICANT CHANGE UP (ref 8.5–10.1)
CHLORIDE SERPL-SCNC: 101 MMOL/L — SIGNIFICANT CHANGE UP (ref 98–110)
CO2 SERPL-SCNC: 26 MMOL/L — SIGNIFICANT CHANGE UP (ref 17–32)
CREAT SERPL-MCNC: 0.5 MG/DL — LOW (ref 0.7–1.5)
EGFR: 99 ML/MIN/1.73M2 — SIGNIFICANT CHANGE UP
GLUCOSE SERPL-MCNC: 98 MG/DL — SIGNIFICANT CHANGE UP (ref 70–99)
HCT VFR BLD CALC: 40.4 % — SIGNIFICANT CHANGE UP (ref 37–47)
HGB BLD-MCNC: 13 G/DL — SIGNIFICANT CHANGE UP (ref 12–16)
MCHC RBC-ENTMCNC: 28.7 PG — SIGNIFICANT CHANGE UP (ref 27–31)
MCHC RBC-ENTMCNC: 32.2 G/DL — SIGNIFICANT CHANGE UP (ref 32–37)
MCV RBC AUTO: 89.2 FL — SIGNIFICANT CHANGE UP (ref 81–99)
NRBC # BLD: 0 /100 WBCS — SIGNIFICANT CHANGE UP (ref 0–0)
PLATELET # BLD AUTO: 258 K/UL — SIGNIFICANT CHANGE UP (ref 130–400)
POTASSIUM SERPL-MCNC: 4.3 MMOL/L — SIGNIFICANT CHANGE UP (ref 3.5–5)
POTASSIUM SERPL-SCNC: 4.3 MMOL/L — SIGNIFICANT CHANGE UP (ref 3.5–5)
PROT SERPL-MCNC: 5.7 G/DL — LOW (ref 6–8)
RBC # BLD: 4.53 M/UL — SIGNIFICANT CHANGE UP (ref 4.2–5.4)
RBC # FLD: 14.5 % — SIGNIFICANT CHANGE UP (ref 11.5–14.5)
SARS-COV-2 RNA SPEC QL NAA+PROBE: SIGNIFICANT CHANGE UP
SODIUM SERPL-SCNC: 139 MMOL/L — SIGNIFICANT CHANGE UP (ref 135–146)
WBC # BLD: 8.8 K/UL — SIGNIFICANT CHANGE UP (ref 4.8–10.8)
WBC # FLD AUTO: 8.8 K/UL — SIGNIFICANT CHANGE UP (ref 4.8–10.8)

## 2022-05-31 PROCEDURE — 93880 EXTRACRANIAL BILAT STUDY: CPT | Mod: 26

## 2022-05-31 PROCEDURE — 99232 SBSQ HOSP IP/OBS MODERATE 35: CPT

## 2022-05-31 PROCEDURE — 99233 SBSQ HOSP IP/OBS HIGH 50: CPT

## 2022-05-31 RX ORDER — LOSARTAN POTASSIUM 100 MG/1
100 TABLET, FILM COATED ORAL DAILY
Refills: 0 | Status: DISCONTINUED | OUTPATIENT
Start: 2022-05-31 | End: 2022-06-02

## 2022-05-31 RX ADMIN — HEPARIN SODIUM 5000 UNIT(S): 5000 INJECTION INTRAVENOUS; SUBCUTANEOUS at 06:45

## 2022-05-31 RX ADMIN — AMLODIPINE BESYLATE 5 MILLIGRAM(S): 2.5 TABLET ORAL at 13:42

## 2022-05-31 RX ADMIN — Medication 81 MILLIGRAM(S): at 13:41

## 2022-05-31 RX ADMIN — HEPARIN SODIUM 5000 UNIT(S): 5000 INJECTION INTRAVENOUS; SUBCUTANEOUS at 18:58

## 2022-05-31 RX ADMIN — CHLORHEXIDINE GLUCONATE 1 APPLICATION(S): 213 SOLUTION TOPICAL at 06:45

## 2022-05-31 RX ADMIN — CHLORHEXIDINE GLUCONATE 1 APPLICATION(S): 213 SOLUTION TOPICAL at 18:57

## 2022-05-31 RX ADMIN — LOSARTAN POTASSIUM 100 MILLIGRAM(S): 100 TABLET, FILM COATED ORAL at 13:42

## 2022-05-31 NOTE — PROGRESS NOTE ADULT - ATTENDING COMMENTS
patient seen and examined agree above note   pending MRI   follow neurology   disposition as per neruology   recall prn
rule out cva - MRI with anesthesia planned for 6/1 per radiology  ruleout cervical dz - MRI cspine pending  neuro q 4 neuro checks  neuro recs DAPT and high dose statin  discussed with icu team
Attending Statement: I have personally performed a face to face diagnostic evaluation on this patient. The patient is suffering from:  CVA  COPD    I have made amendments to the documentation where necessary. I have personally seen and examined this patient.  I have fully participated in the care of this patient.  I have reviewed all pertinent clinical information, including history, physical exam, plan and note.

## 2022-05-31 NOTE — PROGRESS NOTE ADULT - SUBJECTIVE AND OBJECTIVE BOX
MARILEE Bolden [id number]  Neurology Follow up note    Name  CRISTINA STRANGE    HPI:   Patient is a 71y old  Female who presents with a chief complaint of mouth and right hand tingling worse now.  Pt was at hand specialist this am .     (26 May 2022 23:42)      Interval History - Pt seen at bedside with Dr. Mark, patient seated on bed stating she feels improvement. Pt states tingling to face has resolved, and hand is almost back to normal.  Pt denies any headache, dizziness, or blurred vision.  Pt states no previous hx.          Vital Signs Last 24 Hrs  T(C): 36.8 (31 May 2022 07:01), Max: 36.8 (31 May 2022 07:01)  T(F): 98.3 (31 May 2022 07:01), Max: 98.3 (31 May 2022 07:01)  HR: 84 (31 May 2022 07:50) (75 - 94)  BP: 152/65 (31 May 2022 07:50) (111/59 - 181/74)  BP(mean): 93 (31 May 2022 07:50) (76 - 112)  RR: 20 (31 May 2022 07:50) (18 - 22)  SpO2: 91% (30 May 2022 23:00) (91% - 97%)  ICU Vital Signs Last 24 Hrs  T(C): 36.8 (31 May 2022 07:01), Max: 36.8 (31 May 2022 07:01)  T(F): 98.3 (31 May 2022 07:01), Max: 98.3 (31 May 2022 07:01)  HR: 84 (31 May 2022 07:50) (75 - 94)  BP: 152/65 (31 May 2022 07:50) (111/59 - 181/74)  BP(mean): 93 (31 May 2022 07:50) (76 - 112)  ABP: --  ABP(mean): --  RR: 20 (31 May 2022 07:50) (18 - 22)  SpO2: 91% (30 May 2022 23:00) (91% - 97%)          Neurological Exam:     Mental status: Awake, alert and Oriented to time/place/person; Good eye contact ; follow simple commands.  Recent and remote memory intact.  Naming, repetition and comprehension intact.  Attention/concentration intact.  No dysarthria, no aphasia.  Fund of knowledge appropriate.    Cranial nerves: Fundoscopic exam demonstrated no abnormalities, pupils equally round and reactive to light, visual fields full, no nystagmus, extraocular muscles intact, V1 through V3 intact bilaterally and symmetric, face symmetric, hearing intact to finger rub, palate elevation symmetric, tongue was midline, sternocleidomastoid/shoulder shrug strength bilaterally 5/5.    Motor:  Normal bulk and tone, strength 5/5 in bilateral upper and lower extremities.   strength 5/5.  Rapid alternating movements intact mildly diminished to Right Hand compared to left. (RIGHT DOMINANCE)  Sensation: Intact to light touch, proprioception, and pinprick.  No neglect.   Coordination: No dysmetria on finger-to-nose and heel-to-shin.  No clumsiness.  Reflexes: 2+ in upper and lower extremities, downgoing toes bilaterally  NIHSS 0    Medications  amLODIPine   Tablet 5 milliGRAM(s) Oral daily  aspirin  chewable 81 milliGRAM(s) Oral daily  atorvastatin 80 milliGRAM(s) Oral at bedtime  chlorhexidine 4% Liquid 1 Application(s) Topical two times a day  heparin   Injectable 5000 Unit(s) SubCutaneous every 12 hours  LORazepam   Injectable 2 milliGRAM(s) IV Push once  LORazepam   Injectable 1 milliGRAM(s) IV Push once PRN  losartan 100 milliGRAM(s) Oral daily      Lab                        13.0   8.80  )-----------( 258      ( 31 May 2022 05:53 )             40.4     05-31    139  |  101  |  18  ----------------------------<  98  4.3   |  26  |  0.5<L>    Ca    9.0      31 May 2022 05:53  Mg     1.9     05-30    TPro  5.7<L>  /  Alb  3.7  /  TBili  <0.2  /  DBili  x   /  AST  74<H>  /  ALT  79<H>  /  AlkPhos  97  05-31    CAPILLARY BLOOD GLUCOSE        LIVER FUNCTIONS - ( 31 May 2022 05:53 )  Alb: 3.7 g/dL / Pro: 5.7 g/dL / ALK PHOS: 97 U/L / ALT: 79 U/L / AST: 74 U/L / GGT: x                   Other studies:             Assessment- This is a 71y year old Female presenting with PMH of GERD, COPD, HLD, w/ prior transient R hand numbness/weakness now with persistent R  weakness with R face numbness and ? transient ptosis suspicious for central etiology vs cervical radiculopathy       Plan: d/w Dr Mark    - continue secondary stroke prevention with ASA, Lipitor ( pt sts not comfortable taking Lipitor will agree to Lipitor 40 vs 80 if pt agrees to continue dosing)  - Recommend Plavix as secondary stroke prevention medication   - MRI Brain w/w/o elvira  - MRA Head NC  - MRA Neck w/ elvira  - MRI C-spine w/w/o elvira  - await MRI under general anesthesia as per patient request  - continue neuro checks q4hrs  - continue telemetry  - Echo  - if MRI negative for acute stroke pt will need outpatient EMG study for RUE.  - Neurology team will follow.       MARILEE Bolden [id number]  Neurology Follow up note    Name  CRISTINA STRANGE    HPI:   Patient is a 71y old  Female who presents with a chief complaint of mouth and right hand tingling worse now.  Pt was at hand specialist this am .     (26 May 2022 23:42)      Interval History - Pt seen at bedside with Dr. Mark, patient seated on bed stating she feels improvement. Pt states tingling to face has resolved, and hand is almost back to normal.  Pt denies any headache, dizziness, or blurred vision.  Pt states no previous hx.          Vital Signs Last 24 Hrs  T(C): 36.8 (31 May 2022 07:01), Max: 36.8 (31 May 2022 07:01)  T(F): 98.3 (31 May 2022 07:01), Max: 98.3 (31 May 2022 07:01)  HR: 84 (31 May 2022 07:50) (75 - 94)  BP: 152/65 (31 May 2022 07:50) (111/59 - 181/74)  BP(mean): 93 (31 May 2022 07:50) (76 - 112)  RR: 20 (31 May 2022 07:50) (18 - 22)  SpO2: 91% (30 May 2022 23:00) (91% - 97%)  ICU Vital Signs Last 24 Hrs  T(C): 36.8 (31 May 2022 07:01), Max: 36.8 (31 May 2022 07:01)  T(F): 98.3 (31 May 2022 07:01), Max: 98.3 (31 May 2022 07:01)  HR: 84 (31 May 2022 07:50) (75 - 94)  BP: 152/65 (31 May 2022 07:50) (111/59 - 181/74)  BP(mean): 93 (31 May 2022 07:50) (76 - 112)  ABP: --  ABP(mean): --  RR: 20 (31 May 2022 07:50) (18 - 22)  SpO2: 91% (30 May 2022 23:00) (91% - 97%)          Neurological Exam:     Mental status: Awake, alert and Oriented to time/place/person; Good eye contact ; follow simple commands.  Recent and remote memory intact.  Naming, repetition and comprehension intact.  Attention/concentration intact.  No dysarthria, no aphasia.  Fund of knowledge appropriate.    Cranial nerves: Fundoscopic exam demonstrated no abnormalities, pupils equally round and reactive to light, visual fields full, no nystagmus, extraocular muscles intact, V1 through V3 intact bilaterally and symmetric, face mildy asymmetric (decreased movement in depressor anguli oris on left), hearing intact to finger rub, palate elevation symmetric, tongue was midline, sternocleidomastoid/shoulder shrug strength bilaterally 5/5.    Motor:  Normal bulk and tone, strength 5/5 in bilateral upper and lower extremities.   strength 5/5.  Rapid alternating movements intact mildly diminished to Right Hand compared to left. (RIGHT DOMINANCE)  Sensation: Intact to light touch, proprioception, and pinprick.  No neglect.   Coordination: No dysmetria on finger-to-nose and heel-to-shin.  No clumsiness.  Reflexes: 2+ in upper and lower extremities, downgoing toes bilaterally  NIHSS 2 (missed month - off by 1 day, and facial asymmetry which is chronic)    Medications  amLODIPine   Tablet 5 milliGRAM(s) Oral daily  aspirin  chewable 81 milliGRAM(s) Oral daily  atorvastatin 80 milliGRAM(s) Oral at bedtime  chlorhexidine 4% Liquid 1 Application(s) Topical two times a day  heparin   Injectable 5000 Unit(s) SubCutaneous every 12 hours  LORazepam   Injectable 2 milliGRAM(s) IV Push once  LORazepam   Injectable 1 milliGRAM(s) IV Push once PRN  losartan 100 milliGRAM(s) Oral daily      Lab                        13.0   8.80  )-----------( 258      ( 31 May 2022 05:53 )             40.4     05-31    139  |  101  |  18  ----------------------------<  98  4.3   |  26  |  0.5<L>    Ca    9.0      31 May 2022 05:53  Mg     1.9     05-30    TPro  5.7<L>  /  Alb  3.7  /  TBili  <0.2  /  DBili  x   /  AST  74<H>  /  ALT  79<H>  /  AlkPhos  97  05-31    CAPILLARY BLOOD GLUCOSE        LIVER FUNCTIONS - ( 31 May 2022 05:53 )  Alb: 3.7 g/dL / Pro: 5.7 g/dL / ALK PHOS: 97 U/L / ALT: 79 U/L / AST: 74 U/L / GGT: x                   Other studies:           Assessment- This is a 71y year old Female presenting with PMH of GERD, COPD, HLD, w/ prior transient R hand numbness/weakness now with persistent R  weakness with R face numbness and ? transient ptosis suspicious for central etiology vs cervical radiculopathy       Plan: d/w Dr Mark    - continue secondary stroke prevention with ASA, Lipitor ( pt sts not comfortable taking Lipitor will agree to Lipitor 40 vs 80 if pt agrees to continue dosing)  - Recommend Plavix as secondary stroke prevention medication   - MRI Brain w/w/o elvira  - MRA Head NC  - MRA Neck w/ elvira  - MRI C-spine w/w/o elvira  - await MRI under general anesthesia as per patient request  - continue neuro checks q4hrs  - continue telemetry  - Echo  - if MRI negative for acute stroke pt will need outpatient EMG study for RUE.  - Neurology team will follow.

## 2022-05-31 NOTE — PROGRESS NOTE ADULT - SUBJECTIVE AND OBJECTIVE BOX
Patient is a 71y old  Female who presents with a chief complaint of TIA r/o CVA (30 May 2022 16:42)        Over Night Events:  no acute events overnight      ROS:     All ROS are negative except HPI         PHYSICAL EXAM    ICU Vital Signs Last 24 Hrs  T(C): 36.8 (31 May 2022 07:01), Max: 36.8 (31 May 2022 07:01)  T(F): 98.3 (31 May 2022 07:01), Max: 98.3 (31 May 2022 07:01)  HR: 84 (31 May 2022 07:50) (75 - 94)  BP: 152/65 (31 May 2022 07:50) (111/59 - 181/74)  BP(mean): 93 (31 May 2022 07:50) (76 - 112)  ABP: --  ABP(mean): --  RR: 20 (31 May 2022 07:50) (18 - 22)  SpO2: 91% (30 May 2022 23:00) (91% - 97%)      CONSTITUTIONAL:  Well nourished.  NAD    ENT:   Airway patent,   Mouth with normal mucosa.   No thrush    EYES:   Pupils equal,   Round and reactive to light.    CARDIAC:   Normal rate,   Regular rhythm.    No edema      Vascular:  Normal systolic impulse  No Carotid bruits    RESPIRATORY:   No wheezing  Bilateral BS  Normal chest expansion  Not tachypneic,  No use of accessory muscles    GASTROINTESTINAL:  Abdomen soft,   Non-tender,   No guarding,     MUSCULOSKELETAL:   Range of motion is not limited,  No clubbing, cyanosis    NEUROLOGICAL:   Alert and oriented   No motor  deficits.    SKIN:   Skin normal color for race,   Warm and dry and intact.   No evidence of rash.    PSYCHIATRIC:   Normal mood and affect.   No apparent risk to self or others.    HEMATOLOGICAL:  No cervical  lymphadenopathy.  no inguinal lymphadenopathy      05-30-22 @ 07:01  -  05-31-22 @ 07:00  --------------------------------------------------------  IN:  Total IN: 0 mL    OUT:    Voided (mL): 250 mL  Total OUT: 250 mL    Total NET: -250 mL          LABS:                            13.0   8.80  )-----------( 258      ( 31 May 2022 05:53 )             40.4                                               05-31    139  |  101  |  18  ----------------------------<  98  4.3   |  26  |  0.5<L>    Ca    9.0      31 May 2022 05:53  Mg     1.9     05-30    TPro  5.7<L>  /  Alb  3.7  /  TBili  <0.2  /  DBili  x   /  AST  74<H>  /  ALT  79<H>  /  AlkPhos  97  05-31                                             Urinalysis Basic - ( 30 May 2022 16:30 )    Color: Yellow / Appearance: Clear / SG: >=1.030 / pH: x  Gluc: x / Ketone: Trace  / Bili: Negative / Urobili: 0.2 mg/dL   Blood: x / Protein: Negative mg/dL / Nitrite: Negative   Leuk Esterase: Negative / RBC: x / WBC x   Sq Epi: x / Non Sq Epi: x / Bacteria: x                                                  LIVER FUNCTIONS - ( 31 May 2022 05:53 )  Alb: 3.7 g/dL / Pro: 5.7 g/dL / ALK PHOS: 97 U/L / ALT: 79 U/L / AST: 74 U/L / GGT: x                                                                                                                                       MEDICATIONS  (STANDING):  amLODIPine   Tablet 5 milliGRAM(s) Oral daily  aspirin  chewable 81 milliGRAM(s) Oral daily  atorvastatin 80 milliGRAM(s) Oral at bedtime  chlorhexidine 4% Liquid 1 Application(s) Topical two times a day  heparin   Injectable 5000 Unit(s) SubCutaneous every 12 hours  LORazepam   Injectable 2 milliGRAM(s) IV Push once    MEDICATIONS  (PRN):  LORazepam   Injectable 1 milliGRAM(s) IV Push once PRN Additional, second dose to be given if patient remains anxious during MRI after receiving first 2mg IV push lorazepam dose      New X-rays reviewed:                                                                                  ECHO    CXR interpreted by me:

## 2022-05-31 NOTE — PROGRESS NOTE ADULT - SUBJECTIVE AND OBJECTIVE BOX
HPI:   Patient is a 71y old  Female who presents with a chief complaint of mouth and right hand tingling worse now.  Pt was at hand specialist this am      (26 May 2022 23:42)      PAST MEDICAL & SURGICAL HISTORY  GERD (gastroesophageal reflux disease)    History of TIAs    No significant past surgical history        FAMILY HISTORY:  FAMILY HISTORY:  No pertinent family history in first degree relatives        SOCIAL HISTORY:  []smoker  []Alcohol  []Drug    ALLERGIES:  succinylcholine (Other)      MEDICATIONS:  MEDICATIONS  (STANDING):  amLODIPine   Tablet 5 milliGRAM(s) Oral daily  aspirin  chewable 81 milliGRAM(s) Oral daily  atorvastatin 80 milliGRAM(s) Oral at bedtime  chlorhexidine 4% Liquid 1 Application(s) Topical two times a day  heparin   Injectable 5000 Unit(s) SubCutaneous every 12 hours  LORazepam   Injectable 2 milliGRAM(s) IV Push once  losartan 100 milliGRAM(s) Oral daily    MEDICATIONS  (PRN):  LORazepam   Injectable 1 milliGRAM(s) IV Push once PRN Additional, second dose to be given if patient remains anxious during MRI after receiving first 2mg IV push lorazepam dose      HOME MEDICATIONS:  Home Medications:      VITALS:   T(F): 98.3 (05-31 @ 07:01), Max: 98.4 (05-29 @ 08:00)  HR: 84 (05-31 @ 07:50) (68 - 96)  BP: 152/65 (05-31 @ 07:50) (111/59 - 188/79)  BP(mean): 93 (05-31 @ 07:50) (76 - 113)  RR: 20 (05-31 @ 07:50) (17 - 28)  SpO2: 91% (05-30 @ 23:00) (91% - 98%)    I&O's Summary    30 May 2022 07:01  -  31 May 2022 07:00  --------------------------------------------------------  IN: 0 mL / OUT: 250 mL / NET: -250 mL        REVIEW OF SYSTEMS:  CONSTITUTIONAL: No weakness, fevers or chills  EYES: No visual changes  ENT: No vertigo or throat pain   NECK: No pain or stiffness  RESPIRATORY: No cough, wheezing, hemoptysis; No shortness of breath  CARDIOVASCULAR: No chest pain or palpitations  GASTROINTESTINAL: No abdominal or epigastric pain. No nausea, vomiting  GENITOURINARY: No dysuria, frequency or hematuria  NEUROLOGICAL: complains of  numbness   SKIN: No itching, no rashes  MSK: no    PHYSICAL EXAM:  NEURO: patient is awake , alert and oriented  GEN: Not in acute distress  NECK: no thyroid enlargement, no JVD  LUNGS: Clear to auscultation bilaterally   CARDIOVASCULAR: S1/S2 present, RRR , no murmurs or rubs, no carotid bruits,  + PP bilaterally  ABD: Soft, non-tender, non-distended, +BS  NEURO: Motor:  MRC grading 5/5 b/l UE/LE.   strength 4/5.  Normal tone and bulk.  No abnormal movements.    Reflexes: 2+ in bilateral UE/LE, downgoing toes bilaterally. (-) Wilde.  Gait: Narrow and steady. No ataxia.  Romberg negative  EXT: No LEONA  SKIN: Intact    LABS:                        13.0   8.80  )-----------( 258      ( 31 May 2022 05:53 )             40.4     05-31    139  |  101  |  18  ----------------------------<  98  4.3   |  26  |  0.5<L>    Ca    9.0      31 May 2022 05:53  Mg     1.9     05-30    TPro  5.7<L>  /  Alb  3.7  /  TBili  <0.2  /  DBili  x   /  AST  74<H>  /  ALT  79<H>  /  AlkPhos  97  05-31              Troponin trend:      05-27 Chol 241<H> LDL -- HDL 65 Trig 92      RADIOLOGY:  < from: CT Brain Stroke Protocol (05.26.22 @ 19:24) >    IMPRESSION:    No CT evidence for acute intracranial hemorrhage, mass effect or   territorial infarction.    --- End of Report ---    < end of copied text >

## 2022-05-31 NOTE — PROGRESS NOTE ADULT - ASSESSMENT
70 yo F with PMH of GERD, COPD, HLD, TIAs presenting for R facial numbness that started 30 minutes PTA.  Patient also endorses R hand tingling and numbness    IMPRESSION:  R/O cva      pt doing well, awaiting MRI, pt will require sedation/general anesthesia due to claustrophobia, 1st attempt unsuccessful.  Continue asa, statin, neuro f/u    CNS: Awaiting MRI, continue neuro Check carotid doppler, q4 neuro checks    f/u MRI Brain w/w/o elvira; MRA Head NC; MRA Neck w/ elvira; MRI C-spine w/w/o elvira; patient will need MR    HEENT: oral care    PULMONARY: supplemental O2 prn, echo    CARDIOVASCULAR: BP control, TTE added losartan     GI: GI prophylaxis.  Feeding     RENAL: monitor UO    INFECTIOUS DISEASE: off abx    HEMATOLOGICAL:  DVT prophylaxis.    ENDOCRINE:  Follow up FS.  Insulin protocol if needed.    MUSCULOSKELETAL: oob to chair    Monitor in ICU for now

## 2022-05-31 NOTE — PROGRESS NOTE ADULT - ASSESSMENT
IMPRESSION/PLAN:  r/o cva      pt doing well, awaiting MRI, pt will require sedation/general anesthesia due to claustrophobia, 1st attempt unsuccessful.  Continue asa, statin, neuro f/u    CNS: Awaiting MRI, continue neuro Check carotid doppler    HEENT: oral care    PULMONARY: supplemental O2 prn, echo    CARDIOVASCULAR: BP control    GI: GI prophylaxis.  Feeding     RENAL: monitor UO    INFECTIOUS DISEASE: off abx    HEMATOLOGICAL:  DVT prophylaxis.    ENDOCRINE:  Follow up FS.  Insulin protocol if needed.    MUSCULOSKELETAL: oob to chair

## 2022-06-01 LAB
ALBUMIN SERPL ELPH-MCNC: 3.9 G/DL — SIGNIFICANT CHANGE UP (ref 3.5–5.2)
ALP SERPL-CCNC: 102 U/L — SIGNIFICANT CHANGE UP (ref 30–115)
ALT FLD-CCNC: 89 U/L — HIGH (ref 0–41)
ANION GAP SERPL CALC-SCNC: 12 MMOL/L — SIGNIFICANT CHANGE UP (ref 7–14)
AST SERPL-CCNC: 62 U/L — HIGH (ref 0–41)
BASOPHILS # BLD AUTO: 0.06 K/UL — SIGNIFICANT CHANGE UP (ref 0–0.2)
BASOPHILS NFR BLD AUTO: 0.7 % — SIGNIFICANT CHANGE UP (ref 0–1)
BILIRUB SERPL-MCNC: 0.2 MG/DL — SIGNIFICANT CHANGE UP (ref 0.2–1.2)
BUN SERPL-MCNC: 19 MG/DL — SIGNIFICANT CHANGE UP (ref 10–20)
CALCIUM SERPL-MCNC: 9.2 MG/DL — SIGNIFICANT CHANGE UP (ref 8.5–10.1)
CHLORIDE SERPL-SCNC: 104 MMOL/L — SIGNIFICANT CHANGE UP (ref 98–110)
CO2 SERPL-SCNC: 25 MMOL/L — SIGNIFICANT CHANGE UP (ref 17–32)
CREAT SERPL-MCNC: 0.6 MG/DL — LOW (ref 0.7–1.5)
EGFR: 95 ML/MIN/1.73M2 — SIGNIFICANT CHANGE UP
EOSINOPHIL # BLD AUTO: 0.4 K/UL — SIGNIFICANT CHANGE UP (ref 0–0.7)
EOSINOPHIL NFR BLD AUTO: 4.3 % — SIGNIFICANT CHANGE UP (ref 0–8)
GLUCOSE SERPL-MCNC: 102 MG/DL — HIGH (ref 70–99)
HCT VFR BLD CALC: 41.9 % — SIGNIFICANT CHANGE UP (ref 37–47)
HGB BLD-MCNC: 13.5 G/DL — SIGNIFICANT CHANGE UP (ref 12–16)
IMM GRANULOCYTES NFR BLD AUTO: 1.2 % — HIGH (ref 0.1–0.3)
LYMPHOCYTES # BLD AUTO: 2.1 K/UL — SIGNIFICANT CHANGE UP (ref 1.2–3.4)
LYMPHOCYTES # BLD AUTO: 22.8 % — SIGNIFICANT CHANGE UP (ref 20.5–51.1)
MAGNESIUM SERPL-MCNC: 2 MG/DL — SIGNIFICANT CHANGE UP (ref 1.8–2.4)
MCHC RBC-ENTMCNC: 29 PG — SIGNIFICANT CHANGE UP (ref 27–31)
MCHC RBC-ENTMCNC: 32.2 G/DL — SIGNIFICANT CHANGE UP (ref 32–37)
MCV RBC AUTO: 90.1 FL — SIGNIFICANT CHANGE UP (ref 81–99)
MONOCYTES # BLD AUTO: 0.84 K/UL — HIGH (ref 0.1–0.6)
MONOCYTES NFR BLD AUTO: 9.1 % — SIGNIFICANT CHANGE UP (ref 1.7–9.3)
NEUTROPHILS # BLD AUTO: 5.69 K/UL — SIGNIFICANT CHANGE UP (ref 1.4–6.5)
NEUTROPHILS NFR BLD AUTO: 61.9 % — SIGNIFICANT CHANGE UP (ref 42.2–75.2)
NRBC # BLD: 0 /100 WBCS — SIGNIFICANT CHANGE UP (ref 0–0)
PLATELET # BLD AUTO: 277 K/UL — SIGNIFICANT CHANGE UP (ref 130–400)
POTASSIUM SERPL-MCNC: 4.4 MMOL/L — SIGNIFICANT CHANGE UP (ref 3.5–5)
POTASSIUM SERPL-SCNC: 4.4 MMOL/L — SIGNIFICANT CHANGE UP (ref 3.5–5)
PROT SERPL-MCNC: 5.8 G/DL — LOW (ref 6–8)
RBC # BLD: 4.65 M/UL — SIGNIFICANT CHANGE UP (ref 4.2–5.4)
RBC # FLD: 14.6 % — HIGH (ref 11.5–14.5)
SODIUM SERPL-SCNC: 141 MMOL/L — SIGNIFICANT CHANGE UP (ref 135–146)
WBC # BLD: 9.2 K/UL — SIGNIFICANT CHANGE UP (ref 4.8–10.8)
WBC # FLD AUTO: 9.2 K/UL — SIGNIFICANT CHANGE UP (ref 4.8–10.8)

## 2022-06-01 PROCEDURE — 70544 MR ANGIOGRAPHY HEAD W/O DYE: CPT | Mod: 26,59

## 2022-06-01 PROCEDURE — 70553 MRI BRAIN STEM W/O & W/DYE: CPT | Mod: 26

## 2022-06-01 PROCEDURE — 99233 SBSQ HOSP IP/OBS HIGH 50: CPT

## 2022-06-01 PROCEDURE — 70548 MR ANGIOGRAPHY NECK W/DYE: CPT | Mod: 26

## 2022-06-01 PROCEDURE — 72156 MRI NECK SPINE W/O & W/DYE: CPT | Mod: 26

## 2022-06-01 RX ADMIN — HEPARIN SODIUM 5000 UNIT(S): 5000 INJECTION INTRAVENOUS; SUBCUTANEOUS at 06:29

## 2022-06-01 RX ADMIN — HEPARIN SODIUM 5000 UNIT(S): 5000 INJECTION INTRAVENOUS; SUBCUTANEOUS at 18:14

## 2022-06-01 RX ADMIN — CHLORHEXIDINE GLUCONATE 1 APPLICATION(S): 213 SOLUTION TOPICAL at 18:14

## 2022-06-01 RX ADMIN — AMLODIPINE BESYLATE 5 MILLIGRAM(S): 2.5 TABLET ORAL at 06:28

## 2022-06-01 RX ADMIN — LOSARTAN POTASSIUM 100 MILLIGRAM(S): 100 TABLET, FILM COATED ORAL at 06:28

## 2022-06-01 NOTE — PRE-ANESTHESIA EVALUATION ADULT - NSANTHPMHFT_GEN_ALL_CORE
72 yo F with PMH of GERD, COPD, HLD, TIAs presenting for R facial numbness that started 30 minutes PTA.  Patient also endorses R hand tingling and numbness

## 2022-06-01 NOTE — PROGRESS NOTE ADULT - ASSESSMENT
70 yo F with PMH of GERD, COPD, HLD, TIAs presenting for R facial numbness that started 30 minutes PTA.  Patient also endorses R hand tingling and numbness    IMPRESSION:  R/O cva      pt doing well, awaiting MRI, pt will require sedation/general anesthesia due to claustrophobia, 1st attempt unsuccessful.  Continue asa, statin, neuro f/u    CNS: Awaiting MRI, continue neuro Check carotid doppler, q4 neuro checks    f/u MRI Brain w/w/o elvira; MRA Head NC; MRA Neck w/ elvira; MRI C-spine w/w/o elvira; patient will need MR    At [facility] for MR    HEENT: oral care    PULMONARY: supplemental O2 prn, echo    CARDIOVASCULAR: BP control, TTE added losartan     GI: GI prophylaxis.  Feeding     RENAL: monitor UO    INFECTIOUS DISEASE: off abx    HEMATOLOGICAL:  DVT prophylaxis.    ENDOCRINE:  Follow up FS.  Insulin protocol if needed.    MUSCULOSKELETAL: oob to chair    Monitor in ICU for now

## 2022-06-01 NOTE — PROGRESS NOTE ADULT - SUBJECTIVE AND OBJECTIVE BOX
Past 24 Hr  Going to       PAST MEDICAL & SURGICAL HISTORY  GERD (gastroesophageal reflux disease)    History of TIAs    No significant past surgical history        FAMILY HISTORY:  FAMILY HISTORY:  No pertinent family history in first degree relatives        SOCIAL HISTORY:  []smoker  []Alcohol  []Drug    ALLERGIES:  succinylcholine (Other)      MEDICATIONS:  MEDICATIONS  (STANDING):  amLODIPine   Tablet 5 milliGRAM(s) Oral daily  aspirin  chewable 81 milliGRAM(s) Oral daily  atorvastatin 80 milliGRAM(s) Oral at bedtime  chlorhexidine 4% Liquid 1 Application(s) Topical two times a day  heparin   Injectable 5000 Unit(s) SubCutaneous every 12 hours  losartan 100 milliGRAM(s) Oral daily    MEDICATIONS  (PRN):      HOME MEDICATIONS:  Home Medications:      VITALS:   T(F): 96.9 (05-31 @ 23:23), Max: 98.4 (05-29 @ 08:00)  HR: 83 (06-01 @ 08:17) (71 - 94)  BP: 122/54 (05-31 @ 23:23) (111/59 - 181/74)  BP(mean): 78 (05-31 @ 23:23) (76 - 128)  RR: 20 (06-01 @ 08:17) (18 - 26)  SpO2: 96% (05-31 @ 13:55) (91% - 98%)    I&O's Summary    31 May 2022 07:01  -  01 Jun 2022 07:00  --------------------------------------------------------  IN: 420 mL / OUT: 1200 mL / NET: -780 mL        REVIEW OF SYSTEMS:  CONSTITUTIONAL: No weakness, fevers or chills  EYES: No visual changes  ENT: No vertigo or throat pain   NECK: No pain or stiffness  RESPIRATORY: No cough, wheezing, hemoptysis; No shortness of breath  CARDIOVASCULAR: No chest pain or palpitations  GASTROINTESTINAL: No abdominal or epigastric pain. No nausea, vomiting, or hematemesis; No diarrhea or constipation. No melena or hematochezia.  GENITOURINARY: No dysuria, frequency or hematuria  NEUROLOGICAL: No numbness or weakness  SKIN: No itching, no rashes  MSK: no    PHYSICAL EXAM:  NEURO: patient is awake , alert and oriented  GEN: Not in acute distress  NECK: no thyroid enlargement, no JVD  LUNGS: Clear to auscultation bilaterally   CARDIOVASCULAR: S1/S2 present, RRR , no murmurs or rubs, no carotid bruits,  + PP bilaterally  ABD: Soft, non-tender, non-distended, +BS  NEURO: Motor:  MRC grading 5/5 b/l UE/LE.   strength 4/5.  Normal tone and bulk.  No abnormal movements.    Reflexes: 2+ in bilateral UE/LE, downgoing toes bilaterally. (-) Wilde.  Gait: Narrow and steady. No ataxia.  Romberg negative  EXT: No LEONA  SKIN: Intact    LABS:                        13.5   9.20  )-----------( 277      ( 01 Jun 2022 05:59 )             41.9     06-01    141  |  104  |  19  ----------------------------<  102<H>  4.4   |  25  |  0.6<L>    Ca    9.2      01 Jun 2022 05:59  Mg     2.0     06-01    TPro  5.8<L>  /  Alb  3.9  /  TBili  0.2  /  DBili  x   /  AST  62<H>  /  ALT  89<H>  /  AlkPhos  102  06-01              Troponin trend:      05-27 Chol 241<H> LDL -- HDL 65 Trig 92      RADIOLOGY:  MRI Pending

## 2022-06-01 NOTE — PROGRESS NOTE ADULT - SUBJECTIVE AND OBJECTIVE BOX
Patient reports some difficulty with coordination in her R hand      T(F): 96.8 (06-01-22 @ 15:48), Max: 96.9 (05-31-22 @ 23:23)  HR: 83 (06-01-22 @ 15:34)  BP: 160/68 (06-01-22 @ 15:34)  RR: 18 (06-01-22 @ 15:48)  SpO2: 97% (06-01-22 @ 15:34) (97% - 97%)    PHYSICAL EXAM:  GENERAL: NAD  HEAD:  Atraumatic, Normocephalic  EYES: EOMI, PERRLA, conjunctiva and sclera clear  NERVOUS SYSTEM:  Alert & Oriented X3, no focal motor deficits   CHEST/LUNG: Clear to percussion bilaterally; No rales, rhonchi, wheezing, or rubs  HEART: Regular rate and rhythm; No murmurs, rubs, or gallops  ABDOMEN: Soft, Nontender, Nondistended; Bowel sounds present  EXTREMITIES:  2+ Peripheral Pulses, No clubbing, cyanosis, or edema    LABS  06-01    141  |  104  |  19  ----------------------------<  102<H>  4.4   |  25  |  0.6<L>    Ca    9.2      01 Jun 2022 05:59  Mg     2.0     06-01    TPro  5.8<L>  /  Alb  3.9  /  TBili  0.2  /  DBili  x   /  AST  62<H>  /  ALT  89<H>  /  AlkPhos  102  06-01                          13.5   9.20  )-----------( 277      ( 01 Jun 2022 05:59 )             41.9     < from: 12 Lead ECG (05.26.22 @ 19:03) >  Diagnosis Line Normal sinus rhythm    < end of copied text >      RADIOLOGY  < from: MR Head w/wo IV Cont (06.01.22 @ 14:48) >  IMPRESSION:  BRAIN  Findings consistent with subacute left frontal lobe infarct without   evidence of hemorrhagic transformation.    Extensive white matter signal abnormalities statistically representing   moderate chronic microvascular type changes though other etiologies   including chronic infection/inflammation, demyelination or sequela of   migraine headaches could be considered.      BRAIN MRA  Moderate stenoses of the left proximal M2 divisions of the MCA as well as   the bilateral P2 segments of the PCAs. No evidence of vessel occlusion.    NECK MRA  No evidence of carotid or vertebral artery stenosis.    < end of copied text >  < from: MR Cervical Spine w/wo IV Cont (06.01.22 @ 14:46) >  IMPRESSION:  Multilevel degenerative changes and disc bulging most significant at the   C4-C5 and C5-C6 levels with moderate to severe bilateral neuroforaminal   narrowing     < end of copied text >    MEDICATIONS  (STANDING):  amLODIPine   Tablet 5 milliGRAM(s) Oral daily  aspirin  chewable 81 milliGRAM(s) Oral daily  atorvastatin 80 milliGRAM(s) Oral at bedtime  chlorhexidine 4% Liquid 1 Application(s) Topical two times a day  heparin   Injectable 5000 Unit(s) SubCutaneous every 12 hours  losartan 100 milliGRAM(s) Oral daily    MEDICATIONS  (PRN):

## 2022-06-01 NOTE — PROGRESS NOTE ADULT - ASSESSMENT
70 yo F with PMH of  HLD and stroke (non compliant with meds) was admitted 6 days ago with  R facial numbness and R hand weakness     acute stroke / HTN / hyperlipidemia     - compliance with medications discussed    - aspirin, Lipitor   - norvasc, losartan   - check TTE   - PT/OT   - neuro f/u and DC planning

## 2022-06-02 ENCOUNTER — TRANSCRIPTION ENCOUNTER (OUTPATIENT)
Age: 72
End: 2022-06-02

## 2022-06-02 VITALS — DIASTOLIC BLOOD PRESSURE: 54 MMHG | HEART RATE: 77 BPM | SYSTOLIC BLOOD PRESSURE: 108 MMHG

## 2022-06-02 LAB
ALBUMIN SERPL ELPH-MCNC: 3.8 G/DL — SIGNIFICANT CHANGE UP (ref 3.5–5.2)
ALP SERPL-CCNC: 103 U/L — SIGNIFICANT CHANGE UP (ref 30–115)
ALT FLD-CCNC: 73 U/L — HIGH (ref 0–41)
ANION GAP SERPL CALC-SCNC: 11 MMOL/L — SIGNIFICANT CHANGE UP (ref 7–14)
AST SERPL-CCNC: 45 U/L — HIGH (ref 0–41)
BASOPHILS # BLD AUTO: 0.08 K/UL — SIGNIFICANT CHANGE UP (ref 0–0.2)
BASOPHILS NFR BLD AUTO: 0.9 % — SIGNIFICANT CHANGE UP (ref 0–1)
BILIRUB SERPL-MCNC: 0.2 MG/DL — SIGNIFICANT CHANGE UP (ref 0.2–1.2)
BUN SERPL-MCNC: 28 MG/DL — HIGH (ref 10–20)
CALCIUM SERPL-MCNC: 9.2 MG/DL — SIGNIFICANT CHANGE UP (ref 8.5–10.1)
CHLORIDE SERPL-SCNC: 104 MMOL/L — SIGNIFICANT CHANGE UP (ref 98–110)
CO2 SERPL-SCNC: 26 MMOL/L — SIGNIFICANT CHANGE UP (ref 17–32)
CREAT SERPL-MCNC: 0.6 MG/DL — LOW (ref 0.7–1.5)
EGFR: 95 ML/MIN/1.73M2 — SIGNIFICANT CHANGE UP
EOSINOPHIL # BLD AUTO: 0.42 K/UL — SIGNIFICANT CHANGE UP (ref 0–0.7)
EOSINOPHIL NFR BLD AUTO: 4.8 % — SIGNIFICANT CHANGE UP (ref 0–8)
GLUCOSE SERPL-MCNC: 95 MG/DL — SIGNIFICANT CHANGE UP (ref 70–99)
HCT VFR BLD CALC: 41.7 % — SIGNIFICANT CHANGE UP (ref 37–47)
HGB BLD-MCNC: 13.1 G/DL — SIGNIFICANT CHANGE UP (ref 12–16)
IMM GRANULOCYTES NFR BLD AUTO: 1.1 % — HIGH (ref 0.1–0.3)
LYMPHOCYTES # BLD AUTO: 1.86 K/UL — SIGNIFICANT CHANGE UP (ref 1.2–3.4)
LYMPHOCYTES # BLD AUTO: 21.1 % — SIGNIFICANT CHANGE UP (ref 20.5–51.1)
MAGNESIUM SERPL-MCNC: 2 MG/DL — SIGNIFICANT CHANGE UP (ref 1.8–2.4)
MCHC RBC-ENTMCNC: 28.5 PG — SIGNIFICANT CHANGE UP (ref 27–31)
MCHC RBC-ENTMCNC: 31.4 G/DL — LOW (ref 32–37)
MCV RBC AUTO: 90.8 FL — SIGNIFICANT CHANGE UP (ref 81–99)
MONOCYTES # BLD AUTO: 0.84 K/UL — HIGH (ref 0.1–0.6)
MONOCYTES NFR BLD AUTO: 9.5 % — HIGH (ref 1.7–9.3)
NEUTROPHILS # BLD AUTO: 5.5 K/UL — SIGNIFICANT CHANGE UP (ref 1.4–6.5)
NEUTROPHILS NFR BLD AUTO: 62.6 % — SIGNIFICANT CHANGE UP (ref 42.2–75.2)
NRBC # BLD: 0 /100 WBCS — SIGNIFICANT CHANGE UP (ref 0–0)
PLATELET # BLD AUTO: 238 K/UL — SIGNIFICANT CHANGE UP (ref 130–400)
POTASSIUM SERPL-MCNC: 4.7 MMOL/L — SIGNIFICANT CHANGE UP (ref 3.5–5)
POTASSIUM SERPL-SCNC: 4.7 MMOL/L — SIGNIFICANT CHANGE UP (ref 3.5–5)
PROT SERPL-MCNC: 5.9 G/DL — LOW (ref 6–8)
RBC # BLD: 4.59 M/UL — SIGNIFICANT CHANGE UP (ref 4.2–5.4)
RBC # FLD: 14.7 % — HIGH (ref 11.5–14.5)
SODIUM SERPL-SCNC: 141 MMOL/L — SIGNIFICANT CHANGE UP (ref 135–146)
WBC # BLD: 8.8 K/UL — SIGNIFICANT CHANGE UP (ref 4.8–10.8)
WBC # FLD AUTO: 8.8 K/UL — SIGNIFICANT CHANGE UP (ref 4.8–10.8)

## 2022-06-02 PROCEDURE — 99239 HOSP IP/OBS DSCHRG MGMT >30: CPT

## 2022-06-02 PROCEDURE — 99232 SBSQ HOSP IP/OBS MODERATE 35: CPT

## 2022-06-02 RX ORDER — ATORVASTATIN CALCIUM 80 MG/1
1 TABLET, FILM COATED ORAL
Qty: 30 | Refills: 0
Start: 2022-06-02 | End: 2022-07-01

## 2022-06-02 RX ORDER — ASPIRIN/CALCIUM CARB/MAGNESIUM 324 MG
1 TABLET ORAL
Qty: 30 | Refills: 0
Start: 2022-06-02 | End: 2022-07-01

## 2022-06-02 RX ORDER — CLOPIDOGREL BISULFATE 75 MG/1
1 TABLET, FILM COATED ORAL
Qty: 30 | Refills: 0
Start: 2022-06-02 | End: 2022-07-01

## 2022-06-02 RX ORDER — AMLODIPINE BESYLATE 2.5 MG/1
1 TABLET ORAL
Qty: 30 | Refills: 0
Start: 2022-06-02 | End: 2022-07-01

## 2022-06-02 RX ORDER — LOSARTAN POTASSIUM 100 MG/1
1 TABLET, FILM COATED ORAL
Qty: 30 | Refills: 0
Start: 2022-06-02 | End: 2022-07-01

## 2022-06-02 RX ORDER — CLOPIDOGREL BISULFATE 75 MG/1
75 TABLET, FILM COATED ORAL DAILY
Refills: 0 | Status: DISCONTINUED | OUTPATIENT
Start: 2022-06-02 | End: 2022-06-02

## 2022-06-02 RX ADMIN — AMLODIPINE BESYLATE 5 MILLIGRAM(S): 2.5 TABLET ORAL at 05:27

## 2022-06-02 RX ADMIN — CHLORHEXIDINE GLUCONATE 1 APPLICATION(S): 213 SOLUTION TOPICAL at 05:31

## 2022-06-02 RX ADMIN — HEPARIN SODIUM 5000 UNIT(S): 5000 INJECTION INTRAVENOUS; SUBCUTANEOUS at 05:28

## 2022-06-02 RX ADMIN — Medication 81 MILLIGRAM(S): at 11:30

## 2022-06-02 RX ADMIN — CLOPIDOGREL BISULFATE 75 MILLIGRAM(S): 75 TABLET, FILM COATED ORAL at 11:30

## 2022-06-02 RX ADMIN — LOSARTAN POTASSIUM 100 MILLIGRAM(S): 100 TABLET, FILM COATED ORAL at 05:27

## 2022-06-02 NOTE — PROGRESS NOTE ADULT - SUBJECTIVE AND OBJECTIVE BOX
Patient is comfortable in bed, no complaints       T(F): 98.2 (06-02-22 @ 07:01), Max: 98.2 (06-02-22 @ 07:01)  HR: 77 (06-02-22 @ 07:28)  BP: 108/54 (06-02-22 @ 07:28)  RR: 16 (06-02-22 @ 07:01)  SpO2: 98% (06-02-22 @ 04:00) (96% - 98%)    PHYSICAL EXAM:  GENERAL: NAD  HEAD:  Atraumatic, Normocephalic  EYES: EOMI, PERRLA, conjunctiva and sclera clear  NERVOUS SYSTEM:  Alert & Oriented X3, no focal motor  deficits   CHEST/LUNG: Clear to percussion bilaterally; No rales, rhonchi, wheezing, or rubs  HEART: Regular rate and rhythm; No murmurs, rubs, or gallops  ABDOMEN: Soft, Nontender, Nondistended; Bowel sounds present  EXTREMITIES:  2+ Peripheral Pulses, No clubbing, cyanosis, or edema    LABS  06-02    141  |  104  |  28<H>  ----------------------------<  95  4.7   |  26  |  0.6<L>    Ca    9.2      02 Jun 2022 06:31  Mg     2.0     06-02    TPro  5.9<L>  /  Alb  3.8  /  TBili  0.2  /  DBili  x   /  AST  45<H>  /  ALT  73<H>  /  AlkPhos  103  06-02                          13.1   8.80  )-----------( 238      ( 02 Jun 2022 06:31 )             41.7     < from: 12 Lead ECG (05.26.22 @ 19:03) >  Diagnosis Line Normal sinus rhythm    < end of copied text >  < from: TTE Echo Complete w/o Contrast w/ Doppler (07.20.21 @ 08:37) >    Summary:   1. Left ventricular ejection fraction, by visual estimation, is 55 to 60%.   2. Mild left ventricular hypertrophy.   3. Spectral Doppler shows impaired relaxation pattern of left ventricular myocardial filling (Grade I diastolic dysfunction).   4. Normal left atrial size.   5. Mild mitral annular dilatation.   6. Trace mitral valve regurgitation.   7. Estimated pulmonary artery systolic pressure is 41.6 mmHg assuming a right atrial pressure of 3 mmHg, which is consistent with mild pulmonary hypertension.   8. Peak transaortic gradient equals 8.9 mmHg, mean transaortic gradient equals 4.1 mmHg, the calculated aortic valve area equals 2.35 cm² by the continuity equation consistent with mild aortic stenosis.      < end of copied text >      RADIOLOGY  < from: MR Head w/wo IV Cont (06.01.22 @ 14:48) >  IMPRESSION:  BRAIN  Findings consistent with subacute left frontal lobe infarct without   evidence of hemorrhagic transformation.    Extensive white matter signal abnormalities statistically representing   moderate chronic microvascular type changes though other etiologies   including chronic infection/inflammation, demyelination or sequela of   migraine headaches could be considered.      BRAIN MRA  Moderate stenoses of the left proximal M2 divisions of the MCA as well as   the bilateral P2 segments of the PCAs. No evidence of vessel occlusion.    NECK MRA  No evidence of carotid or vertebral artery stenosis.    < end of copied text >    MEDICATIONS  (STANDING):  amLODIPine   Tablet 5 milliGRAM(s) Oral daily  aspirin  chewable 81 milliGRAM(s) Oral daily  atorvastatin 80 milliGRAM(s) Oral at bedtime  chlorhexidine 4% Liquid 1 Application(s) Topical two times a day  heparin   Injectable 5000 Unit(s) SubCutaneous every 12 hours  losartan 100 milliGRAM(s) Oral daily    MEDICATIONS  (PRN):

## 2022-06-02 NOTE — DISCHARGE NOTE PROVIDER - NSDCMRMEDTOKEN_GEN_ALL_CORE_FT
amLODIPine 5 mg oral tablet: 1 tab(s) orally once a day  aspirin 81 mg oral tablet, chewable: 1 tab(s) orally once a day  atorvastatin 80 mg oral tablet: 1 tab(s) orally once a day (at bedtime)  clopidogrel 75 mg oral tablet: 1 tab(s) orally once a day  losartan 100 mg oral tablet: 1 tab(s) orally once a day x 30 days

## 2022-06-02 NOTE — PHARMACOTHERAPY INTERVENTION NOTE - COMMENTS
Counseled patient on use of medications aspirin, plavix, statin, for indication, route, dose, and possible side effects for discharge. Patient reported side effect of blurry vision while on aspirin for TIA a year ago. Patient reported concern on using aspirin and worsening of macular degeneration. Advised patient to inform doctor of her concern about visual disturbance. Patient MD is a 71 year old female with past medical history of TIA and GERD and was admitted after reporting facial numbness.  Upon admission the patient was diagnosed with stroke.  The patient will be discharged and given education on secondary stroke prevention.     Patient Education:  ·	Handouts were administered beginning of consultation  ·	Educated patient on the disease state and why it is important to take medication routinely to prevent secondary stroke  ·	Explained to the patient what stroke is; is an interruption of blood supply to the brain due to a blockage  ·	Provided the patient with a list of medications that are used in order to prevent a secondary stroke (aspirin, Plavix, atorvastatin)  ·	Emphasized the indications for each of these medications as well as the side effects commonly associated with each drug  ·	Aspirin- used as a blood thinner to prevent clotting.  A common side effect is an increased risk of bleeding.  Emphasized the difference between minor bleeds and major bleeds  ·	Plavix- antiplatelet used as blood thinner to prevent clotting. Common side effects include bleeding.   ·	Atorvastatin- used for secondary prevention of stroke.  To decrease the amount of cholesterol found in the blood.  Educated the patient that the common side effect associated with this drug is muscle pain.    Medication Administration  ·	Educated the patient on what the proper times are to take these medications as well as the proper route of administration  ·	Aspirin and Plavix- once daily taken at the same time everyday  ·	Atorvastatin- once daily at bedtime  Final Notes  ·	The patient emphasized that she does not want to take medications as she prefers taking supplements  ·	Educated the patient on the importance of taking these medications in order to prevent a secondary stroke  ·	Patient informed us that she has taken aspirin in the past and discontinued it on her own after two weeks due to maculopapular degeneration (ophthalmic)  ·	Emphasized to the patient that it is important to inform her doctor or a neurologist prior to discharge with regard to her concern of taking aspirin as a result of her prior experience with this drug  ·	Informed the patient that she may also be prescribed a blood pressure drug upon discharge for secondary stroke prevention  ·	Patient voiced her concern that she does not want to take two antiplatelets at the same time   ·	Emphasized to the patient numerous times that it is important to take the prescribed medications for secondary stroke prevention routinely as directed **Discharge Counseling**    Pt came in with stroke.  PMH: TIA, GERD   Discharge med counseling: aspirin, clopidogrel, and atorvastatin     Patient informed us that she has taken aspirin in the past but discontinued it on her own after two weeks due to maculopapular degeneration (ophthalmic). She would like to take only 1 antiplatelet agent.   ·	Informed to follow up with neurology for alternative medication   ·	Emphasized that Aspirin is the main medication for secondary prevention for stroke especially she had TIA in the past  ·	Blurry vision might be a consequence for stroke; not ASA  ·	Informed her that dual antiplatelet therapy may resume temporarily; likely 21- 90 days     Patient Education:  ·	Handouts were administered beginning of consultation  ·	Educated patient on the disease state and why it is important to take medication routinely to prevent secondary stroke  ·	Explained to the patient what stroke is; is an interruption of blood supply to the brain due to a blockage  ·	Provided the patient with a list of medications that are used in order to prevent a secondary stroke (aspirin, Plavix, atorvastatin)    Aspirin & Clopidogrel   -Indication: a blood thinner to prevent clotting  -Administration: once daily  -Side effect: increased risk of bleeding.  Emphasized the difference between minor bleeds (nose bleeds, easy bruising) and major bleeds (coughing up blood). Rec to consult with MD immediately for major bleeds  -Drug interactions: Rec to avoid with NSAIDS for higher risk of bleeding. Rec to take acetaminophen as OTC pain medications     Atorvastatin:  -Indication: used for secondary prevention of stroke  -Administration: take at bedtime  -Side effects: muscle/joint pain  -Food interaction: grapefruit juice can exacerbate side effects    Final Notes  ·	Educated the patient on the importance of taking these medications in order to prevent a secondary stroke  ·	Emphasized to the patient that it is important to follow up with neurologist    Patient verbalized understanding

## 2022-06-02 NOTE — DISCHARGE NOTE PROVIDER - CARE PROVIDER_API CALL
Tera Mark)  Neurology  [address] [place] [address], [address]  [place] [address]  Phone: [phone]  Fax: [phone]  Follow Up Time:

## 2022-06-02 NOTE — PROGRESS NOTE ADULT - SUBJECTIVE AND OBJECTIVE BOX
[facility] [address]  Neurology Follow up note    Name  CRISTINA STRANGE    HPI:   Patient is a 71y old  Female who presents with a chief complaint of mouth and right hand tingling worse now.  Pt was at hand specialist this am      (26 May 2022 23:42)      Interval History - Pt seen at bedside with Dr. Mark, patient sitting on bed awake and without new complaints. Denies  h/a , dizziness, or visual concerns. Pt states still  some "stiffness" to the right hand. No other new complaints.      Vital Signs Last 24 Hrs  T(C): 36.8 (02 Jun 2022 07:01), Max: 36.8 (02 Jun 2022 07:01)  T(F): 98.2 (02 Jun 2022 07:01), Max: 98.2 (02 Jun 2022 07:01)  HR: 77 (02 Jun 2022 07:28) (77 - 84)  BP: 108/54 (02 Jun 2022 07:28) (105/51 - 155/70)  BP(mean): 78 (02 Jun 2022 07:28) (74 - 100)  RR: 16 (02 Jun 2022 07:01) (16 - 18)  SpO2: 98% (02 Jun 2022 04:00) (96% - 98%)  ICU Vital Signs Last 24 Hrs  T(C): 36.8 (02 Jun 2022 07:01), Max: 36.8 (02 Jun 2022 07:01)  T(F): 98.2 (02 Jun 2022 07:01), Max: 98.2 (02 Jun 2022 07:01)  HR: 77 (02 Jun 2022 07:28) (77 - 84)  BP: 108/54 (02 Jun 2022 07:28) (105/51 - 155/70)  BP(mean): 78 (02 Jun 2022 07:28) (74 - 100)  ABP: --  ABP(mean): --  RR: 16 (02 Jun 2022 07:01) (16 - 18)  SpO2: 98% (02 Jun 2022 04:00) (96% - 98%)          Neurological Exam:     Mental status: Awake, alert and Oriented to time/place/person; Good eye contact ; follow simple commands.  Recent and remote memory intact.  Naming, repetition and comprehension intact.  Attention/concentration intact.  No dysarthria, no aphasia.  Fund of knowledge appropriate.    Cranial nerves: Fundoscopic exam demonstrated no abnormalities, pupils equally round and reactive to light, visual fields full, no nystagmus, extraocular muscles intact, V1 through V3 intact bilaterally and symmetric, face mildy asymmetric (decreased movement in depressor anguli oris on left), hearing intact to finger rub, palate elevation symmetric, tongue was midline, sternocleidomastoid/shoulder shrug strength bilaterally 5/5.    Motor:  Normal bulk and tone, strength 5/5 in bilateral upper and lower extremities.   strength 5/5.  Rapid alternating movements intact mildly diminished to Right Hand compared to left. (RIGHT DOMINANCE)  Sensation: Intact to light touch, proprioception, and pinprick.  No neglect.   Coordination: No dysmetria on finger-to-nose and heel-to-shin.  No clumsiness.  Reflexes: 2+ in upper and lower extremities, downgoing toes bilaterally  NIHSS 1( facial asymmetry is chronic)      Medications  amLODIPine   Tablet 5 milliGRAM(s) Oral daily  aspirin  chewable 81 milliGRAM(s) Oral daily  atorvastatin 80 milliGRAM(s) Oral at bedtime  chlorhexidine 4% Liquid 1 Application(s) Topical two times a day  clopidogrel Tablet 75 milliGRAM(s) Oral daily  heparin   Injectable 5000 Unit(s) SubCutaneous every 12 hours  losartan 100 milliGRAM(s) Oral daily      Lab                        13.1   8.80  )-----------( 238      ( 02 Jun 2022 06:31 )             41.7     06-02    141  |  104  |  28<H>  ----------------------------<  95  4.7   |  26  |  0.6<L>    Ca    9.2      02 Jun 2022 06:31  Mg     2.0     06-02    TPro  5.9<L>  /  Alb  3.8  /  TBili  0.2  /  DBili  x   /  AST  45<H>  /  ALT  73<H>  /  AlkPhos  103  06-02    CAPILLARY BLOOD GLUCOSE        LIVER FUNCTIONS - ( 02 Jun 2022 06:31 )  Alb: 3.8 g/dL / Pro: 5.9 g/dL / ALK PHOS: 103 U/L / ALT: 73 U/L / AST: 45 U/L / GGT: x             Radiology      < from: MR Head w/wo IV Cont (06.01.22 @ 14:48) >  IMPRESSION:  BRAIN  Findings consistent with subacute left frontal lobe infarct without   evidence of hemorrhagic transformation.    Extensive white matter signal abnormalities statistically representing   moderate chronic microvascular type changes though other etiologies   including chronic infection/inflammation, demyelination or sequela of   migraine headaches could be considered.      BRAIN MRA  Moderate stenoses of the left proximal M2 divisions of the MCA as well as   the bilateral P2 segments of the PCAs. No evidence of vessel occlusion.    NECK MRA  No evidence of carotid or vertebral artery stenosis.    --- End of Report ---            ALFREDITO MORA MD; Attending Radiologist  This document has been electronically signed. Jun 1 2022  3:35PM    < end of copied text >          Assessment- This is a 71y year old Female presenting with PMH of AFIB, GERD, COPD, HLD, w/ prior transient R hand numbness/weakness now with persistent R  weakness with R face numbness. MRI Significant for subacute frontal lobe Stroke        Plan: d/w Dr Mark    - Results of MRI discussed with patient and NEED for Medicine prevention of subsequent possible strokes, patient is extremely reluctant to accept new medications, or examinations for stroke    prevention, and dangers of non-compliance was explained thoroughly   - Pt needs to continue Plavix 75 daily, ASA 81 Daily, and Atorvastatin 80mg daily  - Recommend outpatient Cardiology for 30 Day Heart monitor to r/o AFIB ( PATIENT HAS ++HX for AFIB)  - Echo still pending  - Pt will need f/u in Stroke Clinic in 2 weeks  - Continue outpatient OT  - PCP for glycemic stabilization and control  - Please contact neurology team for any new concerns.

## 2022-06-02 NOTE — DISCHARGE NOTE NURSING/CASE MANAGEMENT/SOCIAL WORK - PATIENT PORTAL LINK FT
You can access the FollowMyHealth Patient Portal offered by [facility] by registering at the following website: [email or web address]. By joining [employer]’s FollowMyHealth portal, you will also be able to view your health information using other applications (apps) compatible with our system.

## 2022-06-02 NOTE — PROGRESS NOTE ADULT - ASSESSMENT
70 yo F with PMH of  HLD and stroke (non compliant with meds) was admitted one week  ago with  R facial numbness and R hand weakness     acute stroke / HTN / hyperlipidemia     - compliance with medications discussed    - aspirin, Plavix and Lipitor   - Norvasc losartan   - check TTE   - PT/OT   - outpt cardiac event monitor   - may  DC home today - 36min spent on DC

## 2022-06-02 NOTE — PROGRESS NOTE ADULT - NS ATTEND AMEND GEN_ALL_CORE FT
Patient examined am of 5/31/22 and has only minimal decrease in dexterity of right fingers.  NIH score = 2 (one point for missing month, another point for left lower facial asymmetry however mild facial asymmetry was present on a prior picture and is likely congenital).  Needs MRI brain to assess for acute stroke and MRA head/neck.  We recommended restarting atorvastatin at half dose 40 mg since she reported experiencing a panic attack on the 80 mg dose.  We recommended patient also start clopidogrel (initially as 300 mg loading dose) then 75 mg/day, however she wanted to think about it.  I explained how DAPT lowers risk for stroke recurrence in first weeks to months following stroke.
Pt examined 6/2/22 in f/u for stroke.  Her NIH score was 0 though she still has some feeling of stiffness in right hand and slowed fine movements.  She reports single episode of afib many years ago.  I recommended 30-day event monitor at minimum on discharge to search for afib as potential cause for her stroke.  She may benefit from a loop recorder if that is negative.  I explained to her that multiple intracranial vessel showed atherosclerosis and narrowing and that she needs to be on maximal medical therapy.  She was advised to take dual antiplatelet therapy until she is seen in stroke clinic in addition to high intensity statin.  Okay for discharge from stroke standpoint but needs outpatient occupational therapy.

## 2022-06-02 NOTE — DISCHARGE NOTE PROVIDER - HOSPITAL COURSE
72 yo F with PMH of  HLD and stroke (non compliant with meds) was admitted one week  ago with  R facial numbness and R hand weakness .       1< from: MR Head w/wo IV Cont (06.01.22 @ 14:48) >    IMPRESSION:  BRAIN  Findings consistent with subacute left frontal lobe infarct without   evidence of hemorrhagic transformation.    Extensive white matter signal abnormalities statistically representing   moderate chronic microvascular type changes though other etiologies   including chronic infection/inflammation, demyelination or sequela of   migraine headaches could be considered.      BRAIN MRA  Moderate stenoses of the left proximal M2 divisions of the MCA as well as   the bilateral P2 segments of the PCAs. No evidence of vessel occlusion.    NECK MRA  No evidence of carotid or vertebral artery stenosis.        acute stroke / HTN / hyperlipidemia     - compliance with medications discussed    - aspirin, Plavix and Lipitor   - Norvasc losartan   - outpt cardiac event monitor

## 2022-06-02 NOTE — PROGRESS NOTE ADULT - PROVIDER SPECIALTY LIST ADULT
Neurology
Critical Care
Neurology
Neurology
Pulmonology
CCU
Critical Care
Hospitalist
Pulmonology
Hospitalist

## 2022-06-02 NOTE — DISCHARGE NOTE PROVIDER - NSDCCPCAREPLAN_GEN_ALL_CORE_FT
PRINCIPAL DISCHARGE DIAGNOSIS  Diagnosis: Acute cerebrovascular accident (CVA)  Assessment and Plan of Treatment: Please take all medication as prescibed   PLease follow up at [facility] cardiac center for an events monitor.      SECONDARY DISCHARGE DIAGNOSES  Diagnosis: Hyperlipemia  Assessment and Plan of Treatment:     Diagnosis: HTN (hypertension)  Assessment and Plan of Treatment:

## 2022-06-02 NOTE — DISCHARGE NOTE PROVIDER - NSDCFUADDAPPT_GEN_ALL_CORE_FT
Please, with in two weeks of discharge follow up at the cardiac center  for event monitor placement Phone:  [phone]  MCOT device

## 2022-06-02 NOTE — PROGRESS NOTE ADULT - REASON FOR ADMISSION
TIA r/o CVA
CVA
TIA r/o CVA
CVA
TIA r/o CVA
TIA r/o CVA

## 2022-06-02 NOTE — DISCHARGE NOTE NURSING/CASE MANAGEMENT/SOCIAL WORK - NSDCPEFALRISK_GEN_ALL_CORE
For information on Fall & Injury Prevention, visit: [email or web address] OR  [email or web address] OR  https://www.cdc.gov/steadi/patient.html

## 2022-06-10 DIAGNOSIS — R27.8 OTHER LACK OF COORDINATION: ICD-10-CM

## 2022-06-10 PROBLEM — Z86.73 PERSONAL HISTORY OF TRANSIENT ISCHEMIC ATTACK (TIA), AND CEREBRAL INFARCTION WITHOUT RESIDUAL DEFICITS: Chronic | Status: ACTIVE | Noted: 2022-05-26

## 2022-06-15 NOTE — CDI QUERY NOTE - NSCDIOTHERTXTBX_GEN_ALL_CORE_HH
Documentation:  ** 6/2 Discharge Summary:     - Hospital Course: admitted one week  ago with  R facial numbness and R hand weakness .      - PRINCIPAL DIAGNOSIS: Acute cerebrovascular accident (CVA)     Imaging:  ** 6/1 MRI Head: IMPRESSION:     - BRAIN         Findings consistent with subacute left frontal lobe infarct without evidence of hemorrhagic transformation.     - BRAIN MRA         Moderate stenoses of the left proximal M2 divisions of the MCA as well as the bilateral P2 segments of the PCAs. No evidence of vessel occlusion.                                                       Query  Based on your clinical judgment and consideration of these clinical indicators, please clarify if acute cerebrovascular accident (CVA) can be further specified as:  • Acute cerebrovascular accident (CVA) associated with stenosis of left proximal M2 divisions of the middle cerebral artery (MCA)  • Other (please specify).  • Unable to further specify acute cerebrovascular accident (CVA)

## 2022-06-22 ENCOUNTER — APPOINTMENT (OUTPATIENT)
Dept: NEUROLOGY | Facility: CLINIC | Age: 72
End: 2022-06-22
Payer: MEDICARE

## 2022-06-22 VITALS
BODY MASS INDEX: 24.43 KG/M2 | HEART RATE: 71 BPM | OXYGEN SATURATION: 99 % | SYSTOLIC BLOOD PRESSURE: 174 MMHG | TEMPERATURE: 97.8 F | DIASTOLIC BLOOD PRESSURE: 81 MMHG | HEIGHT: 66 IN | WEIGHT: 152 LBS

## 2022-06-22 DIAGNOSIS — I63.9 CEREBRAL INFARCTION, UNSPECIFIED: ICD-10-CM

## 2022-06-22 DIAGNOSIS — R29.898 OTHER SYMPTOMS AND SIGNS INVOLVING THE MUSCULOSKELETAL SYSTEM: ICD-10-CM

## 2022-06-22 PROCEDURE — 99215 OFFICE O/P EST HI 40 MIN: CPT

## 2022-06-22 RX ORDER — CLOPIDOGREL BISULFATE 75 MG/1
75 TABLET, FILM COATED ORAL
Qty: 30 | Refills: 0 | Status: ACTIVE | COMMUNITY
Start: 2022-06-02

## 2022-06-22 RX ORDER — AMLODIPINE BESYLATE 5 MG/1
5 TABLET ORAL
Qty: 30 | Refills: 0 | Status: ACTIVE | COMMUNITY
Start: 2022-06-02

## 2022-06-22 RX ORDER — LOSARTAN POTASSIUM 100 MG/1
100 TABLET, FILM COATED ORAL
Qty: 30 | Refills: 0 | Status: ACTIVE | COMMUNITY
Start: 2022-06-02

## 2022-06-22 RX ORDER — PRAVASTATIN SODIUM 10 MG/1
10 TABLET ORAL
Qty: 30 | Refills: 0 | Status: ACTIVE | COMMUNITY
Start: 2022-06-14

## 2022-06-22 NOTE — HISTORY OF PRESENT ILLNESS
[FreeTextEntry1] : Patient is a 71 yo RH woman, quit smoking 15 yrs ago, with PMHx of TIA vs stroke in 2021 (no MRI done 2/2 claustrophobia, sx was RH weakness), macular degeneration, who presents as HFU from 5/2022 for LEFT FRONTAL infarct without evidence of hemorrhagic transformation in setting of/correlation w/ MODERATE stenoses of the proximal L M2 divisions of the MCA. She also has moderate stenosis of bilateral P2 segments of the PCAs. \par \par In 7/2021 she pw/co of RH weakness for 1 day. CTH/CTA H/N were negative. MRI H was deferred bc of claustrophobia. She was seen by Dr. Marquise Markham who wrote exam suggested proprioceptive problem with right hand.  DDX: Stroke in left parietal region likely embolic. Says she went to see Dr. Caldwell. Said RH weakness was not impacting her life, was told not CTS.   \par \par She was readmitted 5/26/22 for facial numbness that started 30 min PTA. Also had RH tingling and numbness since the day before. Initial CTH negative.  \par \par CT c-spine 5/2022:  \par -There is straightening of the cervical spine consistent with muscular spasm and/or degenerative change. \par -Degenerative changes with disc space narrowing and osteophyte formation at C4-5 \par -Mild facet joint and uncovertebral joint hypertrophy is noted with encroachment upon the neural foramina bilaterally at C4-5 \par \par MRI c-spine: Multilevel degenerative changes and disc bulging most significant at the C4-C5 and C5-C6 levels with moderate to severe bilateral neuroforaminal narrowing. MOD spinal canal stenosis at C4-C5. MOD b/l neuroforaminal narrowing at C6-C7.  \par \par B/L Carotid Duplex 5/2022: \par -Antegrade flow is noted within both vertebral arteries. \par -Mild 40-59% internal carotid artery stenosis bilaterally. \par \par Echo 7/20/21: LVEF 55-60%, mild LVH, g1dd, nml LA size, mild pulm HTN and AS \par \par MRI H 5/2022: Subacute LEFT FRONTAL infarct without evidence of hemorrhagic transformation. MODERATE chronic microvascular changes.  \par \par MRA H: MODERATE stenoses of the left proximal M2 divisions of the MCA as well as the bilateral P2 segments of the PCAs. No evidence of vessel occlusion. \par \par MRA N: Negative  \par \par , A1c 5.8  \par \par She was sent home on ASA 81, Lipitor 80, and Plavix 75  \par ***Superior division of the MCA irrigates the inferior frontal lobe.  \par --------------------------------------------------\par Today, she states that her RH is bother her more, rest of arm is not affected. She had episode of R thumb with high frequency involuntary ab/adduction. Sometimes wakes up in AM and has RH tingling/numbness. RH also reels like sausages and tightness, losing dexterity and fine movement, issues with picking up pencil. This has been ongoing since 7/2021. PTA was on ASA after 7/2021 episode but stopped bc caused eye discomfort. This time, had to stop DAPT bc had macular hemorrhage. She takes herbal supplements to thin her blood. She also cannot take Lipitor and is on Pravastatin right now. Currently wearing MCOT. \par \par SoHx: Son passed away 1.5 ago and she's had emotional eating \par \par

## 2022-06-22 NOTE — PHYSICAL EXAM
[FreeTextEntry1] : Focal neurological exam:\par \par MS: Awake, alert, oriented to person, place, situation and time. Normal affect. Follows commands. \par \par Language: Speech is clear, fluent with good repetition & comprehension. No dysarthria. \par \par CNs 2 - 12 intact. EOMI no nystagmus, no diplopia. VFF. No facial asymmetry b/l, full eye closure strength b/l. Hearing grossly normal. Head turning & shoulder shrug intact b/l. Tongue midline, normal movements, no atrophy.\par \par Motor: Normal muscle bulk & tone. No noticeable tremor or seizure. No pronator drift. Muscle strength of b/l UE and b/l LE 5/5. \par \par Reflexes: DTR of R biceps 3+, L biceps 2+, b/l knees 1+ \par \par Sensation: Intact to LT, temperature and vibration b/l throughout\par \par Cortical: No extinction\par \par Coordination: No dysmetria to FTN.\par \par Gait: No postural instability. Normal stance and tandem gait.\par \par NIHSS 0\par \par \par \par \par

## 2022-06-22 NOTE — ASSESSMENT
[FreeTextEntry1] : Patient is a 71 yo RH woman, quit smoking 15 yrs ago, with PMHx of TIA vs stroke in 2021 (no MRI done 2/2 claustrophobia, sx was RH weakness), macular degeneration, who presents as HFU from 5/2022 for LEFT FRONTAL infarct without evidence of hemorrhagic transformation in setting of/correlation w/ MODERATE stenoses of the proximal L M2 divisions of the MCA. She also has moderate stenosis of bilateral P2 segments of the PCAs. My concern is that possibly both episodes were related to L M2 stenosis and NOVA may be helpful. Regarding her RH, I'm not sure if it's related to stroke or there is underlying NM issues so will send for EMG. She has brisk reflexes of R biceps to 3+ and L at 2+, didn't see RUE fasciculations, couldn't appreciate significant weakness, but she endorses dropping objects and isolated episode of R thumb involuntary high frequency ab/adduction. \par \par PLAN: \par -EMG b/l UE \par -F/u with attending for antiplatelet intolerance and alternative options \par -Repeat lipid panel and A1c \par -Take Pravastatin 10 for 1 mo as per cardiology and repeat lipid panel  \par -F/u MCOT results   \par -Deferring Nutritionist \par -Hydration \par -C/w OT for RH fine motor\par -F/u with Dr. Solomon in 2-3 months. Consider NOVA for baseline\par \par Aggressive risk factor modification should be continued for secondary stroke prevention, consisting of intensive blood pressure control and cholesterol monitoring. I encouraged the patient to discuss these important issues with her primary care doctor.  \par \par I have reviewed the goals of stroke risk factor modification. I counseled the patient on measures to reduce stroke risk, including the importance of medication compliance, risk factor control, exercise, healthy diet and avoidance of smoking. I reviewed stroke warning signs and symptoms and appropriate actions to take if such occur.\par

## 2022-07-01 ENCOUNTER — NON-APPOINTMENT (OUTPATIENT)
Age: 72
End: 2022-07-01

## 2022-07-07 ENCOUNTER — APPOINTMENT (OUTPATIENT)
Dept: CARDIOLOGY | Facility: CLINIC | Age: 72
End: 2022-07-07

## 2022-07-07 PROCEDURE — 93228 REMOTE 30 DAY ECG REV/REPORT: CPT

## 2022-07-18 ENCOUNTER — NON-APPOINTMENT (OUTPATIENT)
Age: 72
End: 2022-07-18

## 2022-07-22 ENCOUNTER — NON-APPOINTMENT (OUTPATIENT)
Age: 72
End: 2022-07-22

## 2022-08-04 ENCOUNTER — APPOINTMENT (OUTPATIENT)
Dept: NEUROLOGY | Facility: CLINIC | Age: 72
End: 2022-08-04

## 2022-08-16 NOTE — PATIENT PROFILE ADULT - FALL HARM RISK - FALLEN IN PAST
Nml PNBW except TSH/T4 known hypothyroid unmedicated due to insurance previously  Needs to restart  No

## 2022-11-17 ENCOUNTER — APPOINTMENT (OUTPATIENT)
Dept: NEUROLOGY | Facility: CLINIC | Age: 72
End: 2022-11-17

## 2023-01-24 NOTE — PHYSICAL THERAPY INITIAL EVALUATION ADULT - ASSISTIVE DEVICE:SUPINE/SIT, REHAB EVAL
Hospital Medicine History & Physical      PCP: Abdelrahman Hammer MD    Date of Admission: 2023    Date of Service: Pt seen/examined on 2023 and placed in Observation. Chief Complaint:  Back pain    History Of Present Illness:   78 y.o. female who presented to [facility] with back pain. PMHx significant for RA, HTN, Non-ischemic Cardiomyopathy, Chronic Systolic CHF, Afib HLD, Hypothyroidism. She is originally from [facility] [place]. She has longstanding RA. She is followed by Rheumatology Dr. Kailash Godinez. She reportedly stopped taking her MTX about 4 weeks ago as she didn't think it was helping. She had a fall about 5 weeks ago and during ED evaluation was noted to have right posterior 9th rib fracture. Symptoms of this gradually resolved. She has been in good health lately. However, on the morning of 23 she awoke with severe left lateral back pain and difficulty breathing from the pain. No recent injury or muscle strain noted. She denies having any actual chest or abdominal pain. There was a brief episode of vomiting but she thinks related to some food she ate today. Past Medical History:        Diagnosis Date    Allergic rhinitis     CHF (congestive heart failure), NYHA class I, acute on chronic, combined ([address] 2021    Hyperlipidemia     Hypertension     Hypothyroidism     probable autoimmune thyroiditis    LBBB (left bundle branch block)     ON EKG, had normal GXT    Rheumatoid arthritis(714.0)     Rheumatologist in [place]       Past Surgical History:        Procedure Laterality Date     SECTION      x2    COLONOSCOPY  2005    JOINT REPLACEMENT Left 13    left total knee replacement       Medications Prior to Admission:   Prior to Admission medications    Medication Sig Start Date End Date Taking?  Authorizing Provider   doxepin (SINEQUAN) 10 MG capsule Take 1 capsule by mouth nightly 23   Abdelrahman Hammer MD   apixaban (ELIQUIS) 2.5 MG TABS tablet TAKE 1 TABLET TWICE A DAY 1/9/23   GRACIELA Baker CNP   hydrOXYzine HCl (ATARAX) 10 MG tablet Take 1-2 tablets by mouth 3 times daily as needed for Itching 1/2/23   Snow Best MD   triamcinolone (KENALOG) 0.1 % cream Apply topically 2 times daily as needed. 1/2/23   Snow Best MD   levothyroxine (SYNTHROID) 25 MCG tablet TAKE 1 TABLET DAILY 12/20/22   Garret Patterson MD   spironolactone (ALDACTONE) 25 MG tablet TAKE 1 TABLET BY MOUTH EVERY DAY 12/2/22   GRACIELA Baker CNP   amiodarone (PACERONE) 100 MG tablet TAKE 1 tablet every OTHER day 11/8/22   Madelaine Fleischer., MD   pantoprazole (PROTONIX) 40 MG tablet Take 1 tablet by mouth 2 times daily (before meals)  Patient taking differently: Take 40 mg by mouth as needed 9/21/22   Madelaine Fleischer., MD   tretinoin (RETIN-A) 0.025 % cream Apply a thin layer to face nightly, 15 minutes after washing.   Patient not taking: Reported on 11/14/2022 2/24/22   Garret Patterson MD   furosemide (LASIX) 40 MG tablet Take 1 tablet by mouth daily as needed (SWELLING OR WEIGHT > 125 LBS)  Patient taking differently: Take 40 mg by mouth daily 10/21/21   GRACIELA Baker CNP   folic acid (FOLVITE) 1 MG tablet Take 1 mg by mouth Six times weekly Except mondays    Historical Provider, MD   fluticasone (FLONASE) 50 MCG/ACT nasal spray 1 spray by Each Nostril route daily as needed for Allergies     Historical Provider, MD   methotrexate (RHEUMATREX) 2.5 MG chemo tablet TAKE [facility]  Patient taking differently: TAKE 6 TABLETS EVERY MONDAY 2/12/21   Garret Pattreson MD   VITAMIN D PO Take 1,000 Units by mouth daily     Historical Provider, MD   Probiotic Product (PROBIOTIC PO) Take 1 capsule by mouth daily     Historical Provider, MD   Ascorbic Acid (VITAMIN C PO) Take 1,000 mg by mouth daily     Historical Provider, MD   fish oil-omega-3 fatty acids 1000 MG capsule Take 4 g by mouth daily     Historical Provider, MD       Allergies:   Patient has no known allergies. Social History:    TOBACCO:   reports that she has quit smoking. She has never used smokeless tobacco.  ETOH:   reports current alcohol use of about 2.0 standard drinks per week. E-cigarette/Vaping       Questions Responses    E-cigarette/Vaping Use Never User    Start Date     Passive Exposure     Quit Date     Counseling Given     Comments               Family History:    Reviewed and does not pertain to current illness/condition. REVIEW OF SYSTEMS:   Pertinent positives as noted in the HPI. All other systems reviewed with patient as able and negative. Physical Exam Performed:  /65   Pulse 52   Temp 97.7 °F (36.5 °C) (Oral)   Resp 17   SpO2 97%   General appearance:  No apparent distress, appears stated age and cooperative. HEENT:  Pupils equal, round, and reactive to light. Conjunctivae/corneas clear. Neck:  Supple, no jugular venous distention. Trachea midline with full range of motion. Respiratory:  Normal respiratory effort. Clear to auscultation, bilaterally without Rales/Wheezes/Rhonchi. Cardiovascular:  Regular rate and rhythm with normal S1/S2 without murmurs, rubs or gallops. Abdomen:  Soft, non-tender, non-distended with normal bowel sounds. Musculoskelatal:  No clubbing, cyanosis or edema bilaterally. Full range of motion without deformity. Neurologic:  Neurovascularly intact without any focal sensory/motor deficits. Cranial nerves: II-XII intact, grossly non-focal.  Psychiatric:  Alert and oriented, thought content appropriate, normal insight  Skin:  Skin color, texture, turgor normal.  No rashes or lesions.   Capillary Refill:  Brisk,< 3 seconds   Peripheral Pulses:  +2 palpable, equal bilaterally     Labs:     Recent Labs     01/24/23  1243   WBC 7.7   HGB 13.9   HCT 42.1        Recent Labs     01/24/23  1243   *   K 4.0   CL 98*   CO2 24   BUN 12   CREATININE <0.5*   CALCIUM 9.1     Recent Labs     01/24/23  1243   AST 19   ALT 13 BILITOT 0.5   ALKPHOS 78     No results for input(s): INR in the last 72 hours. Recent Labs     01/24/23  1243   TROPONINI <0.01       Radiology:     CXR: I have reviewed the CXR with the following interpretation: Clear  EKG:  I have reviewed the EKG with the following interpretation: NSR, chronic LBBB. XR RIBS RIGHT INCLUDE CHEST (MIN 3 VIEWS)    Result Date: 1/24/2023  EXAMINATION: XRAY VIEWS OF THE RIGHT RIBS WITH FRONTAL XRAY VIEW OF THE CHEST 1/24/2023 9:09 am COMPARISON: 12/17/2022 HISTORY: ORDERING SYSTEM PROVIDED HISTORY: Injury TECHNOLOGIST PROVIDED HISTORY: Reason for exam:->Injury Reason for Exam: rib pain FINDINGS: Subjective skeletal osteopenia. No radiographically detectable right rib fracture. No bone destruction or malignant-appearing periosteal reaction in the visualized thorax. Visualized right lung field demonstrates no consolidations, detectable pleural fluid or pneumothorax. Diminished acromial humeral distance noted consistent with chronic rotator cuff tear on the right. No acute radiographic abnormality right ribs. Subjective skeletal osteopenia, findings consistent with chronic right rotator cuff tear incidentally noted. XR THORACIC SPINE (2 VIEWS)    Result Date: 1/24/2023  EXAMINATION: XRAY VIEWS OF THE THORACIC SPINE 1/24/2023 9:26 am COMPARISON: None. HISTORY: ORDERING SYSTEM PROVIDED HISTORY: concern for compression fracture TECHNOLOGIST PROVIDED HISTORY: Reason for exam:->concern for compression fracture Reason for Exam: concern for compression fx FINDINGS: No acute appearing compression fracture. Degenerative disc changes in the upper lumbar spine. No acute findings.      CT ABDOMEN PELVIS W IV CONTRAST Additional Contrast? None    Result Date: 1/24/2023  EXAMINATION: CT OF THE ABDOMEN AND PELVIS WITH CONTRAST; CTA OF THE CHEST 1/24/2023 2:29 pm TECHNIQUE: CT of the abdomen and pelvis was performed with the administration of intravenous contrast. Multiplanar reformatted images are provided for review. Automated exposure control, iterative reconstruction, and/or weight based adjustment of the mA/kV was utilized to reduce the radiation dose to as low as reasonably achievable.; CTA of the chest was performed after the administration of intravenous contrast.  Multiplanar reformatted images are provided for review. MIP images are provided for review. Automated exposure control, iterative reconstruction, and/or weight based adjustment of the mA/kV was utilized to reduce the radiation dose to as low as reasonably achievable. COMPARISON: 06/17/2021 and 03/30/2021 CT studies HISTORY: ORDERING SYSTEM PROVIDED HISTORY: Pain TECHNOLOGIST PROVIDED HISTORY: Reason for exam:->Pain Additional Contrast?->None Reason for Exam: n/v-rib injury 4 wks ago-upper abd discomfort; ORDERING SYSTEM PROVIDED HISTORY: Sharp chest pain TECHNOLOGIST PROVIDED HISTORY: Reason for exam:->Sharp chest pain Decision Support Exception - unselect if not a suspected or confirmed emergency medical condition->Emergency Medical Condition (MA) Reason for Exam: rib injury 4 wks ago-now sob FINDINGS: Chest: Pulmonary arteries: Study is of good technical quality for evaluation of pulmonary embolism. There are no filling defects to suggest pulmonary embolism. Main pulmonary artery is normal in caliber. No evidence of right ventricular strain. Mediastinum: The heart is enlarged. Normal aorta. Normal thyroid. No lymphadenopathy. Normal esophagus. Lungs/pleura: Airways are patent. No pleural fluid. Dependent airspace opacities are mild in both lower lobes with resolution of pleural fluid and airspace changes from the remote prior CT. Underlying mild centrilobular emphysema. Soft Tissues/Bones: Mildly displaced fractures of the posterior right 8th through 10th ribs with callus formation suggesting that these are subacute. No additional skeletal finding.  Abdomen/Pelvis: Organs: Tiny cysts measuring no greater than 1 cm are stable in the liver. Gallbladder, biliary ducts, pancreas and spleen normal.  Kidneys and adrenal glands are normal. GI/Bowel: The stomach, duodenum and small bowel are normal. A normal appendix is visualized. Extensive diverticular change of the left and sigmoid colon. No acute inflammation is identified. Pelvis: The bladder is unremarkable. Uterine atrophy in this postmenopausal patient. Bilateral simple adnexal cysts measuring up to 4.0 cm on the left. These are stable from remote imaging. Peritoneum/Retroperitoneum: The aorta tapers normally. No lymph node enlargement. Bones/Soft Tissues: Remote right inferior pubic ramus fracture. No acute skeletal finding. 1. No pulmonary embolism. 2. Right 8th through 10th ribs demonstrate probable subacute fractures. 3. Cardiomegaly, emphysema and dependent atelectasis in the lung bases. 4. No acute finding of the abdomen or pelvis. 5. Bilateral simple adnexal cysts in a postmenopausal patient likely postmenopausal cysts. Given stability for greater than 1 year, these are likely benign. Consider ultrasound follow-up on a nonemergent outpatient basis. RECOMMENDATIONS: Managing Incidental Adnexal Cystic Mass detected on CT or MR Simple-Appearing Cyst Late postmenopausal: If 1 cm or less, no follow up. If >1 cm - 7cm, US promptly. If greater than 7cm, prompt but nonemergent MRI with contrast. Reference:  Elyssa Guzmán al. Managing Incidental Findings on Abdominal CT: White Paper of the ACR Incidental Findings Committee. J Am Tono Radiol 8393;1:059-612     CT CHEST PULMONARY EMBOLISM W CONTRAST    Result Date: 1/24/2023  EXAMINATION: CT OF THE ABDOMEN AND PELVIS WITH CONTRAST; CTA OF THE CHEST 1/24/2023 2:29 pm TECHNIQUE: CT of the abdomen and pelvis was performed with the administration of intravenous contrast. Multiplanar reformatted images are provided for review.  Automated exposure control, iterative reconstruction, and/or weight based adjustment of the mA/kV was utilized to reduce the radiation dose to as low as reasonably achievable.; CTA of the chest was performed after the administration of intravenous contrast.  Multiplanar reformatted images are provided for review. MIP images are provided for review. Automated exposure control, iterative reconstruction, and/or weight based adjustment of the mA/kV was utilized to reduce the radiation dose to as low as reasonably achievable. COMPARISON: 06/17/2021 and 03/30/2021 CT studies HISTORY: ORDERING SYSTEM PROVIDED HISTORY: Pain TECHNOLOGIST PROVIDED HISTORY: Reason for exam:->Pain Additional Contrast?->None Reason for Exam: n/v-rib injury 4 wks ago-upper abd discomfort; ORDERING SYSTEM PROVIDED HISTORY: Sharp chest pain TECHNOLOGIST PROVIDED HISTORY: Reason for exam:->Sharp chest pain Decision Support Exception - unselect if not a suspected or confirmed emergency medical condition->Emergency Medical Condition (MA) Reason for Exam: rib injury 4 wks ago-now sob FINDINGS: Chest: Pulmonary arteries: Study is of good technical quality for evaluation of pulmonary embolism. There are no filling defects to suggest pulmonary embolism. Main pulmonary artery is normal in caliber. No evidence of right ventricular strain. Mediastinum: The heart is enlarged. Normal aorta. Normal thyroid. No lymphadenopathy. Normal esophagus. Lungs/pleura: Airways are patent. No pleural fluid. Dependent airspace opacities are mild in both lower lobes with resolution of pleural fluid and airspace changes from the remote prior CT. Underlying mild centrilobular emphysema. Soft Tissues/Bones: Mildly displaced fractures of the posterior right 8th through 10th ribs with callus formation suggesting that these are subacute. No additional skeletal finding. Abdomen/Pelvis: Organs: Tiny cysts measuring no greater than 1 cm are stable in the liver. Gallbladder, biliary ducts, pancreas and spleen normal.  Kidneys and adrenal glands are normal. GI/Bowel:  The stomach, duodenum and small bowel are normal. A normal appendix is visualized. Extensive diverticular change of the left and sigmoid colon. No acute inflammation is identified. Pelvis: The bladder is unremarkable. Uterine atrophy in this postmenopausal patient. Bilateral simple adnexal cysts measuring up to 4.0 cm on the left. These are stable from remote imaging. Peritoneum/Retroperitoneum: The aorta tapers normally. No lymph node enlargement. Bones/Soft Tissues: Remote right inferior pubic ramus fracture. No acute skeletal finding. 1. No pulmonary embolism. 2. Right 8th through 10th ribs demonstrate probable subacute fractures. 3. Cardiomegaly, emphysema and dependent atelectasis in the lung bases. 4. No acute finding of the abdomen or pelvis. 5. Bilateral simple adnexal cysts in a postmenopausal patient likely postmenopausal cysts. Given stability for greater than 1 year, these are likely benign. Consider ultrasound follow-up on a nonemergent outpatient basis. RECOMMENDATIONS: Managing Incidental Adnexal Cystic Mass detected on CT or MR Simple-Appearing Cyst Late postmenopausal: If 1 cm or less, no follow up. If >1 cm - 7cm, US promptly. If greater than 7cm, prompt but nonemergent MRI with contrast. Reference:  Marga Ceja al. Managing Incidental Findings on Abdominal CT: White Paper of the ACR Incidental Findings Committee. J Am Tono Radiol 2010;7:754-773       Consults:    IP CONSULT TO CASE MANAGEMENT      ASSESSMENT:    Principal Problem:    Back pain  Active Problems:    Rheumatoid arthritis involving multiple joints (HCC)    A-fib (HCC)    Chronic systolic CHF (congestive heart failure) (HCC)    Non-ischemic cardiomyopathy (HCC)  Resolved Problems:    * No resolved hospital problems. *      PLAN:    Back pain: EKG/Troponin, CT chest/Abd/Pelvis was re-assuring. No PE, no aortic pathology.  Given her description, pleuritic nature, worse with movement, suspect this is most likely a musculoskeletal etiology. She does have long standing RA. Recent rib fractures but these appear to be healing. Muscle spasm/strain, pleurisy, etc are all possibilities. Recommend trial of Toradol and Lidocaine patches, along with PRN Norco. Will re-assess for symptomatic relief tomorrow. Consider PT/OT as tolerated. Rheumatoid arthritis involving multiple joints (HCC)    A-fib: Stable. Continue Amiodarone, Eliquis. Monitor. Chronic systolic CHF (congestive heart failure): Euvolemic. Continue home regimen. DVT Prophylaxis: Eliquis  Diet: ADULT DIET; Regular;  No Caffeine  Code Status: Full  PT/OT Eval Status: Pending  Dispo - 1-2 days     Ramez Quigley MD bed rails

## 2023-03-08 ENCOUNTER — APPOINTMENT (OUTPATIENT)
Dept: NEUROLOGY | Facility: CLINIC | Age: 73
End: 2023-03-08

## 2023-08-06 NOTE — ED ADULT NURSE NOTE - SUICIDE SCREENING QUESTION 2
GENERAL SURGERY CONSULT NOTE    Patient is a 22y old  Male who presents with a chief complaint of     HPI:      PAST MEDICAL & SURGICAL HISTORY:  No pertinent past medical history          Review of Systems:    I have reviewed 9 systems with the patient and the only positive findings were     MEDICATIONS  (STANDING):    MEDICATIONS  (PRN):  aluminum hydroxide/magnesium hydroxide/simethicone Suspension 30 milliLiter(s) Oral once PRN Dyspepsia      Allergies    No Known Allergies    Intolerances        Social History:      FAMILY HISTORY:      Vital Signs Last 24 Hrs  T(C): 36.5 (06 Aug 2023 00:05), Max: 36.8 (05 Aug 2023 19:18)  T(F): 97.7 (06 Aug 2023 00:05), Max: 98.2 (05 Aug 2023 19:18)  HR: 82 (06 Aug 2023 00:05) (82 - 109)  BP: 124/76 (06 Aug 2023 00:05) (124/76 - 134/75)  BP(mean): --  RR: 18 (06 Aug 2023 00:05) (18 - 20)  SpO2: 100% (06 Aug 2023 00:05) (99% - 100%)    Parameters below as of 06 Aug 2023 00:05  Patient On (Oxygen Delivery Method): room air        Physical Exam:  Vital Signs Last 24 Hrs  T(C): 36.5 (06 Aug 2023 00:05), Max: 36.8 (05 Aug 2023 19:18)  T(F): 97.7 (06 Aug 2023 00:05), Max: 98.2 (05 Aug 2023 19:18)  HR: 82 (06 Aug 2023 00:05) (82 - 109)  BP: 124/76 (06 Aug 2023 00:05) (124/76 - 134/75)  BP(mean): --  RR: 18 (06 Aug 2023 00:05) (18 - 20)  SpO2: 100% (06 Aug 2023 00:05) (99% - 100%)    Parameters below as of 06 Aug 2023 00:05  Patient On (Oxygen Delivery Method): room air        General:  A&Ox3,Appears stated age, No acute distress,  Head: NC/AT  EENT: PERRLA. EOMI. Conjunctiva and sclera clear. Pharynx clear.  Neck: Supple. No JVD  Lungs: CTA B/l. Nonlabored Respirations  CV: +S1S2, RRR  Abdomen: Soft, Nondistended,  Nontender, no guarding, no rebound  Extremities: Warm and well perfused. 2+ peripheral pulses b/l. Calf soft, nontender b/l. No pedal edema.            LABS:                        14.3   8.96  )-----------( 271      ( 05 Aug 2023 20:24 )             40.7     08-05    138  |  106  |  23<H>  ----------------------------<  95  3.3<L>   |  26  |  1.08    Ca    9.5      05 Aug 2023 20:24    TPro  7.8  /  Alb  4.3  /  TBili  1.3<H>  /  DBili  x   /  AST  41<H>  /  ALT  52  /  AlkPhos  69  08-05    LIVER FUNCTIONS - ( 05 Aug 2023 20:24 )  Alb: 4.3 g/dL / Pro: 7.8 g/dL / ALK PHOS: 69 U/L / ALT: 52 U/L DA / AST: 41 U/L / GGT: x           PT/INR - ( 05 Aug 2023 20:24 )   PT: 11.4 sec;   INR: 1.00 ratio         PTT - ( 05 Aug 2023 20:24 )  PTT:28.9 sec  Urinalysis Basic - ( 05 Aug 2023 20:32 )    Color: Yellow / Appearance: Clear / S.010 / pH: x  Gluc: x / Ketone: Trace  / Bili: Negative / Urobili: Negative   Blood: x / Protein: 15 mg/dL / Nitrite: Negative   Leuk Esterase: Negative / RBC: 0-2 /HPF / WBC 0-2 /HPF   Sq Epi: x / Non Sq Epi: x / Bacteria: Trace /HPF        RADIOLOGY & ADDITIONAL STUDIES: No GENERAL SURGERY CONSULT NOTE     chief complaint of Abd pain x 1 month    HPI:  22 y.o male with no sign PMHx or PSHx presents to the ED with Abd pain x 1 month . Admits pain to be sharp, severe,  mostly  epigastric/periumbilical relieved by PPIs and food but  denies nausea or vomiting. Pt is in the ED today because pt is getting increasingly worse . Denies n, v, fever, dysuria, hematuria, diarrhea. No other complaints at this time    PAST MEDICAL & SURGICAL HISTORY:  No pertinent past medical history      MEDICATIONS  (STANDING):    MEDICATIONS  (PRN):  aluminum hydroxide/magnesium hydroxide/simethicone Suspension 30 milliLiter(s) Oral once PRN Dyspepsia      Allergies    No Known Allergies    Intolerances        Social History:      FAMILY HISTORY:      Vital Signs Last 24 Hrs  T(C): 36.5 (06 Aug 2023 00:05), Max: 36.8 (05 Aug 2023 19:18)  T(F): 97.7 (06 Aug 2023 00:05), Max: 98.2 (05 Aug 2023 19:18)  HR: 82 (06 Aug 2023 00:05) (82 - 109)  BP: 124/76 (06 Aug 2023 00:05) (124/76 - 134/75)  BP(mean): --  RR: 18 (06 Aug 2023 00:05) (18 - 20)  SpO2: 100% (06 Aug 2023 00:05) (99% - 100%)    Parameters below as of 06 Aug 2023 00:05  Patient On (Oxygen Delivery Method): room air        Physical Exam:  Vital Signs Last 24 Hrs  T(C): 36.5 (06 Aug 2023 00:05), Max: 36.8 (05 Aug 2023 19:18)  T(F): 97.7 (06 Aug 2023 00:05), Max: 98.2 (05 Aug 2023 19:18)  HR: 82 (06 Aug 2023 00:05) (82 - 109)  BP: 124/76 (06 Aug 2023 00:05) (124/76 - 134/75)  BP(mean): --  RR: 18 (06 Aug 2023 00:05) (18 - 20)  SpO2: 100% (06 Aug 2023 00:05) (99% - 100%)    Parameters below as of 06 Aug 2023 00:05  Patient On (Oxygen Delivery Method): room air        General:  A&Ox3,Appears stated age, No acute distress,  Head: NC/AT  EENT: PERRLA. EOMI. Conjunctiva and sclera clear. Pharynx clear.  Neck: Supple. No JVD  Lungs: CTA B/l. Nonlabored Respirations  CV: +S1S2, RRR  Abdomen: Soft, Nondistended,  ++mild epigastric/periumbilical tenderness, no guarding, no rebound  Extremities: Warm and well perfused. 2+ peripheral pulses b/l. Calf soft, nontender b/l. No pedal edema.            LABS:                        14.3   8.96  )-----------( 271      ( 05 Aug 2023 20:24 )             40.7     08    138  |  106  |  23<H>  ----------------------------<  95  3.3<L>   |  26  |  1.08    Ca    9.5      05 Aug 2023 20:24    TPro  7.8  /  Alb  4.3  /  TBili  1.3<H>  /  DBili  x   /  AST  41<H>  /  ALT  52  /  AlkPhos  69  08-05    LIVER FUNCTIONS - ( 05 Aug 2023 20:24 )  Alb: 4.3 g/dL / Pro: 7.8 g/dL / ALK PHOS: 69 U/L / ALT: 52 U/L DA / AST: 41 U/L / GGT: x           PT/INR - ( 05 Aug 2023 20:24 )   PT: 11.4 sec;   INR: 1.00 ratio         PTT - ( 05 Aug 2023 20:24 )  PTT:28.9 sec  Urinalysis Basic - ( 05 Aug 2023 20:32 )    Color: Yellow / Appearance: Clear / S.010 / pH: x  Gluc: x / Ketone: Trace  / Bili: Negative / Urobili: Negative   Blood: x / Protein: 15 mg/dL / Nitrite: Negative   Leuk Esterase: Negative / RBC: 0-2 /HPF / WBC 0-2 /HPF   Sq Epi: x / Non Sq Epi: x / Bacteria: Trace /HPF        RADIOLOGY & ADDITIONAL STUDIES:  < from: CT Abdomen and Pelvis w/ IV Cont (23 @ 21:38) >  FINDINGS:  LOWER CHEST: Within normal limits.    LIVER: Within normal limits.  BILE DUCTS: Normal caliber.  GALLBLADDER: Within normal limits.  SPLEEN: Within normal limits.  PANCREAS: Within normal limits.  ADRENALS: Within normal limits.  KIDNEYS/URETERS: Within normal limits.    BLADDER: Within normal limits.  REPRODUCTIVE ORGANS: Prostate within normal limits.    BOWEL: Mildly dilated small bowel loops predominantly in the left upper   quadrant without abrupt transition point identified. Appendix is normal.  PERITONEUM: No ascites.  VESSELS: Within normal limits.  RETROPERITONEUM/LYMPH NODES: No lymphadenopathy.  ABDOMINAL WALL: Within normal limits.  BONES: Within normal limits.    IMPRESSION:  Mildly dilated small bowel loops without abrupt transition point   identified, may reflect ileus. Early/developing obstruction should be   excluded clinically.    < end of copied text >

## 2023-10-23 ENCOUNTER — NON-APPOINTMENT (OUTPATIENT)
Age: 73
End: 2023-10-23

## 2023-11-06 ENCOUNTER — APPOINTMENT (OUTPATIENT)
Dept: NEUROLOGY | Facility: CLINIC | Age: 73
End: 2023-11-06

## 2024-01-12 ENCOUNTER — INPATIENT (INPATIENT)
Facility: HOSPITAL | Age: 74
LOS: 2 days | Discharge: HOME HEALTH | DRG: 522 | End: 2024-01-15
Attending: INTERNAL MEDICINE | Admitting: INTERNAL MEDICINE
Payer: MEDICARE

## 2024-01-12 VITALS
OXYGEN SATURATION: 95 % | SYSTOLIC BLOOD PRESSURE: 191 MMHG | HEART RATE: 77 BPM | WEIGHT: 149.91 LBS | DIASTOLIC BLOOD PRESSURE: 89 MMHG | RESPIRATION RATE: 18 BRPM | TEMPERATURE: 99 F

## 2024-01-12 DIAGNOSIS — S72.009A FRACTURE OF UNSPECIFIED PART OF NECK OF UNSPECIFIED FEMUR, INITIAL ENCOUNTER FOR CLOSED FRACTURE: ICD-10-CM

## 2024-01-12 DIAGNOSIS — R09.89 OTHER SPECIFIED SYMPTOMS AND SIGNS INVOLVING THE CIRCULATORY AND RESPIRATORY SYSTEMS: ICD-10-CM

## 2024-01-12 LAB
ALBUMIN SERPL ELPH-MCNC: 3.9 G/DL — SIGNIFICANT CHANGE UP (ref 3.5–5.2)
ALBUMIN SERPL ELPH-MCNC: 3.9 G/DL — SIGNIFICANT CHANGE UP (ref 3.5–5.2)
ALP SERPL-CCNC: 108 U/L — SIGNIFICANT CHANGE UP (ref 30–115)
ALP SERPL-CCNC: 108 U/L — SIGNIFICANT CHANGE UP (ref 30–115)
ALT FLD-CCNC: 28 U/L — SIGNIFICANT CHANGE UP (ref 0–41)
ALT FLD-CCNC: 28 U/L — SIGNIFICANT CHANGE UP (ref 0–41)
ANION GAP SERPL CALC-SCNC: 8 MMOL/L — SIGNIFICANT CHANGE UP (ref 7–14)
ANION GAP SERPL CALC-SCNC: 8 MMOL/L — SIGNIFICANT CHANGE UP (ref 7–14)
AST SERPL-CCNC: 25 U/L — SIGNIFICANT CHANGE UP (ref 0–41)
AST SERPL-CCNC: 25 U/L — SIGNIFICANT CHANGE UP (ref 0–41)
BASOPHILS # BLD AUTO: 0.08 K/UL — SIGNIFICANT CHANGE UP (ref 0–0.2)
BASOPHILS # BLD AUTO: 0.08 K/UL — SIGNIFICANT CHANGE UP (ref 0–0.2)
BASOPHILS NFR BLD AUTO: 1 % — SIGNIFICANT CHANGE UP (ref 0–1)
BASOPHILS NFR BLD AUTO: 1 % — SIGNIFICANT CHANGE UP (ref 0–1)
BILIRUB SERPL-MCNC: 0.2 MG/DL — SIGNIFICANT CHANGE UP (ref 0.2–1.2)
BILIRUB SERPL-MCNC: 0.2 MG/DL — SIGNIFICANT CHANGE UP (ref 0.2–1.2)
BUN SERPL-MCNC: 17 MG/DL — SIGNIFICANT CHANGE UP (ref 10–20)
BUN SERPL-MCNC: 17 MG/DL — SIGNIFICANT CHANGE UP (ref 10–20)
CALCIUM SERPL-MCNC: 9.3 MG/DL — SIGNIFICANT CHANGE UP (ref 8.4–10.5)
CALCIUM SERPL-MCNC: 9.3 MG/DL — SIGNIFICANT CHANGE UP (ref 8.4–10.5)
CHLORIDE SERPL-SCNC: 98 MMOL/L — SIGNIFICANT CHANGE UP (ref 98–110)
CHLORIDE SERPL-SCNC: 98 MMOL/L — SIGNIFICANT CHANGE UP (ref 98–110)
CO2 SERPL-SCNC: 32 MMOL/L — SIGNIFICANT CHANGE UP (ref 17–32)
CO2 SERPL-SCNC: 32 MMOL/L — SIGNIFICANT CHANGE UP (ref 17–32)
CREAT SERPL-MCNC: 0.5 MG/DL — LOW (ref 0.7–1.5)
CREAT SERPL-MCNC: 0.5 MG/DL — LOW (ref 0.7–1.5)
EGFR: 98 ML/MIN/1.73M2 — SIGNIFICANT CHANGE UP
EGFR: 98 ML/MIN/1.73M2 — SIGNIFICANT CHANGE UP
EOSINOPHIL # BLD AUTO: 0.32 K/UL — SIGNIFICANT CHANGE UP (ref 0–0.7)
EOSINOPHIL # BLD AUTO: 0.32 K/UL — SIGNIFICANT CHANGE UP (ref 0–0.7)
EOSINOPHIL NFR BLD AUTO: 3.9 % — SIGNIFICANT CHANGE UP (ref 0–8)
EOSINOPHIL NFR BLD AUTO: 3.9 % — SIGNIFICANT CHANGE UP (ref 0–8)
GLUCOSE SERPL-MCNC: 72 MG/DL — SIGNIFICANT CHANGE UP (ref 70–99)
GLUCOSE SERPL-MCNC: 72 MG/DL — SIGNIFICANT CHANGE UP (ref 70–99)
HCT VFR BLD CALC: 38.6 % — SIGNIFICANT CHANGE UP (ref 37–47)
HCT VFR BLD CALC: 38.6 % — SIGNIFICANT CHANGE UP (ref 37–47)
HGB BLD-MCNC: 12.7 G/DL — SIGNIFICANT CHANGE UP (ref 12–16)
HGB BLD-MCNC: 12.7 G/DL — SIGNIFICANT CHANGE UP (ref 12–16)
IMM GRANULOCYTES NFR BLD AUTO: 0.4 % — HIGH (ref 0.1–0.3)
IMM GRANULOCYTES NFR BLD AUTO: 0.4 % — HIGH (ref 0.1–0.3)
LYMPHOCYTES # BLD AUTO: 1.48 K/UL — SIGNIFICANT CHANGE UP (ref 1.2–3.4)
LYMPHOCYTES # BLD AUTO: 1.48 K/UL — SIGNIFICANT CHANGE UP (ref 1.2–3.4)
LYMPHOCYTES # BLD AUTO: 17.9 % — LOW (ref 20.5–51.1)
LYMPHOCYTES # BLD AUTO: 17.9 % — LOW (ref 20.5–51.1)
MCHC RBC-ENTMCNC: 29.3 PG — SIGNIFICANT CHANGE UP (ref 27–31)
MCHC RBC-ENTMCNC: 29.3 PG — SIGNIFICANT CHANGE UP (ref 27–31)
MCHC RBC-ENTMCNC: 32.9 G/DL — SIGNIFICANT CHANGE UP (ref 32–37)
MCHC RBC-ENTMCNC: 32.9 G/DL — SIGNIFICANT CHANGE UP (ref 32–37)
MCV RBC AUTO: 88.9 FL — SIGNIFICANT CHANGE UP (ref 81–99)
MCV RBC AUTO: 88.9 FL — SIGNIFICANT CHANGE UP (ref 81–99)
MONOCYTES # BLD AUTO: 0.81 K/UL — HIGH (ref 0.1–0.6)
MONOCYTES # BLD AUTO: 0.81 K/UL — HIGH (ref 0.1–0.6)
MONOCYTES NFR BLD AUTO: 9.8 % — HIGH (ref 1.7–9.3)
MONOCYTES NFR BLD AUTO: 9.8 % — HIGH (ref 1.7–9.3)
NEUTROPHILS # BLD AUTO: 5.53 K/UL — SIGNIFICANT CHANGE UP (ref 1.4–6.5)
NEUTROPHILS # BLD AUTO: 5.53 K/UL — SIGNIFICANT CHANGE UP (ref 1.4–6.5)
NEUTROPHILS NFR BLD AUTO: 67 % — SIGNIFICANT CHANGE UP (ref 42.2–75.2)
NEUTROPHILS NFR BLD AUTO: 67 % — SIGNIFICANT CHANGE UP (ref 42.2–75.2)
NRBC # BLD: 0 /100 WBCS — SIGNIFICANT CHANGE UP (ref 0–0)
NRBC # BLD: 0 /100 WBCS — SIGNIFICANT CHANGE UP (ref 0–0)
PLATELET # BLD AUTO: 382 K/UL — SIGNIFICANT CHANGE UP (ref 130–400)
PLATELET # BLD AUTO: 382 K/UL — SIGNIFICANT CHANGE UP (ref 130–400)
PMV BLD: 10.3 FL — SIGNIFICANT CHANGE UP (ref 7.4–10.4)
PMV BLD: 10.3 FL — SIGNIFICANT CHANGE UP (ref 7.4–10.4)
POTASSIUM SERPL-MCNC: 4.2 MMOL/L — SIGNIFICANT CHANGE UP (ref 3.5–5)
POTASSIUM SERPL-MCNC: 4.2 MMOL/L — SIGNIFICANT CHANGE UP (ref 3.5–5)
POTASSIUM SERPL-SCNC: 4.2 MMOL/L — SIGNIFICANT CHANGE UP (ref 3.5–5)
POTASSIUM SERPL-SCNC: 4.2 MMOL/L — SIGNIFICANT CHANGE UP (ref 3.5–5)
PROT SERPL-MCNC: 6.4 G/DL — SIGNIFICANT CHANGE UP (ref 6–8)
PROT SERPL-MCNC: 6.4 G/DL — SIGNIFICANT CHANGE UP (ref 6–8)
RBC # BLD: 4.34 M/UL — SIGNIFICANT CHANGE UP (ref 4.2–5.4)
RBC # BLD: 4.34 M/UL — SIGNIFICANT CHANGE UP (ref 4.2–5.4)
RBC # FLD: 14.1 % — SIGNIFICANT CHANGE UP (ref 11.5–14.5)
RBC # FLD: 14.1 % — SIGNIFICANT CHANGE UP (ref 11.5–14.5)
SODIUM SERPL-SCNC: 138 MMOL/L — SIGNIFICANT CHANGE UP (ref 135–146)
SODIUM SERPL-SCNC: 138 MMOL/L — SIGNIFICANT CHANGE UP (ref 135–146)
WBC # BLD: 8.25 K/UL — SIGNIFICANT CHANGE UP (ref 4.8–10.8)
WBC # BLD: 8.25 K/UL — SIGNIFICANT CHANGE UP (ref 4.8–10.8)
WBC # FLD AUTO: 8.25 K/UL — SIGNIFICANT CHANGE UP (ref 4.8–10.8)
WBC # FLD AUTO: 8.25 K/UL — SIGNIFICANT CHANGE UP (ref 4.8–10.8)

## 2024-01-12 PROCEDURE — 93010 ELECTROCARDIOGRAM REPORT: CPT

## 2024-01-12 PROCEDURE — 88305 TISSUE EXAM BY PATHOLOGIST: CPT

## 2024-01-12 PROCEDURE — 97162 PT EVAL MOD COMPLEX 30 MIN: CPT | Mod: GP

## 2024-01-12 PROCEDURE — 97116 GAIT TRAINING THERAPY: CPT | Mod: GP

## 2024-01-12 PROCEDURE — 93970 EXTREMITY STUDY: CPT

## 2024-01-12 PROCEDURE — 93005 ELECTROCARDIOGRAM TRACING: CPT

## 2024-01-12 PROCEDURE — 99222 1ST HOSP IP/OBS MODERATE 55: CPT | Mod: AI

## 2024-01-12 PROCEDURE — 73552 X-RAY EXAM OF FEMUR 2/>: CPT | Mod: 26,LT

## 2024-01-12 PROCEDURE — 73502 X-RAY EXAM HIP UNI 2-3 VIEWS: CPT | Mod: LT

## 2024-01-12 PROCEDURE — 73700 CT LOWER EXTREMITY W/O DYE: CPT | Mod: 26,LT,MA

## 2024-01-12 PROCEDURE — 71046 X-RAY EXAM CHEST 2 VIEWS: CPT

## 2024-01-12 PROCEDURE — 85027 COMPLETE CBC AUTOMATED: CPT

## 2024-01-12 PROCEDURE — 80048 BASIC METABOLIC PNL TOTAL CA: CPT

## 2024-01-12 PROCEDURE — C9399: CPT

## 2024-01-12 PROCEDURE — 73502 X-RAY EXAM HIP UNI 2-3 VIEWS: CPT | Mod: 26,LT

## 2024-01-12 PROCEDURE — 88311 DECALCIFY TISSUE: CPT

## 2024-01-12 PROCEDURE — C1889: CPT

## 2024-01-12 PROCEDURE — 99285 EMERGENCY DEPT VISIT HI MDM: CPT | Mod: FS

## 2024-01-12 PROCEDURE — 97110 THERAPEUTIC EXERCISES: CPT | Mod: GP

## 2024-01-12 PROCEDURE — 93970 EXTREMITY STUDY: CPT | Mod: 26

## 2024-01-12 PROCEDURE — C1776: CPT

## 2024-01-12 PROCEDURE — 71046 X-RAY EXAM CHEST 2 VIEWS: CPT | Mod: 26

## 2024-01-12 PROCEDURE — 36415 COLL VENOUS BLD VENIPUNCTURE: CPT

## 2024-01-12 RX ORDER — LANOLIN ALCOHOL/MO/W.PET/CERES
3 CREAM (GRAM) TOPICAL AT BEDTIME
Refills: 0 | Status: DISCONTINUED | OUTPATIENT
Start: 2024-01-12 | End: 2024-01-13

## 2024-01-12 RX ORDER — ONDANSETRON 8 MG/1
4 TABLET, FILM COATED ORAL EVERY 8 HOURS
Refills: 0 | Status: DISCONTINUED | OUTPATIENT
Start: 2024-01-12 | End: 2024-01-13

## 2024-01-12 RX ORDER — CHLORHEXIDINE GLUCONATE 213 G/1000ML
1 SOLUTION TOPICAL
Refills: 0 | Status: DISCONTINUED | OUTPATIENT
Start: 2024-01-12 | End: 2024-01-13

## 2024-01-12 RX ORDER — ENOXAPARIN SODIUM 100 MG/ML
40 INJECTION SUBCUTANEOUS EVERY 24 HOURS
Refills: 0 | Status: DISCONTINUED | OUTPATIENT
Start: 2024-01-12 | End: 2024-01-13

## 2024-01-12 RX ORDER — OXYCODONE AND ACETAMINOPHEN 5; 325 MG/1; MG/1
2 TABLET ORAL EVERY 4 HOURS
Refills: 0 | Status: DISCONTINUED | OUTPATIENT
Start: 2024-01-12 | End: 2024-01-12

## 2024-01-12 RX ORDER — MORPHINE SULFATE 50 MG/1
2 CAPSULE, EXTENDED RELEASE ORAL EVERY 6 HOURS
Refills: 0 | Status: DISCONTINUED | OUTPATIENT
Start: 2024-01-12 | End: 2024-01-13

## 2024-01-12 RX ORDER — SODIUM CHLORIDE 9 MG/ML
1000 INJECTION, SOLUTION INTRAVENOUS
Refills: 0 | Status: DISCONTINUED | OUTPATIENT
Start: 2024-01-12 | End: 2024-01-12

## 2024-01-12 RX ORDER — ACETAMINOPHEN 500 MG
1000 TABLET ORAL EVERY 8 HOURS
Refills: 0 | Status: DISCONTINUED | OUTPATIENT
Start: 2024-01-12 | End: 2024-01-13

## 2024-01-12 RX ORDER — AMLODIPINE BESYLATE 2.5 MG/1
10 TABLET ORAL DAILY
Refills: 0 | Status: DISCONTINUED | OUTPATIENT
Start: 2024-01-12 | End: 2024-01-13

## 2024-01-12 RX ADMIN — Medication 2 MILLIGRAM(S): at 15:32

## 2024-01-12 RX ADMIN — MORPHINE SULFATE 2 MILLIGRAM(S): 50 CAPSULE, EXTENDED RELEASE ORAL at 20:23

## 2024-01-12 NOTE — H&P ADULT - ATTENDING COMMENTS
72 yo female with PMHx TIA, HTN, HLD, KEVEN admitted with L displaced hip fracture      1.Mechanical fall complicated by L hip fracture   - Admit to medicine   - Lovenox px daily   - NPO after midnight   - Pain meds prn                  - laxative   - Venous duplex:pending   - NWB, bedrest  - CHG as pt going for surgery   - Cardio eval   - Ortho consult: Plan for surgery no time yet.     2. H/O TIA  - Hgba1c:pending           - pt not taking any meds (Was on aspirin and statin)     3. HTN uncontrolled   * pt stopped taking meds on her own   - Start amlodipine 10mg po daily     4. HLD  - check lipids    5. DVT Proph  - Lovenox daily    Patient is usually active ( Walk holding >25 lbs of grocery for half mile). denies any active chest pain, sob. she doesn't use insulin. she likely   has uncontrolled hypertension and lipid profile. she needs urgent surgery . her revise cardiac index is 1. She is medically clear as the benefit exceed the risks but no optimized medically as pt was not taking meds for her chronic condition

## 2024-01-12 NOTE — H&P ADULT - ASSESSMENT
74 yo female with PMHx TIA, HTN, HLD, KEVEN admitted with L displaced hip fracture          Plan:  1. L hip fx  - seen by ortho - needs OR  - NPO p MN  - cardio eval for risk strat  - pain meds PRN  - NWB, bedrest  - fup venous duplex   - lipid profile in AM  -Hgb A 1C in AM    2. H/O TIA  - pt not taking any meds    3. HTN  - monitor BP    4. HLD  - check lipids    5. DVT Proph  - Lovenox daily    6. CHG bath      All above d/w Dr Marquise Quiles

## 2024-01-12 NOTE — CONSULT NOTE ADULT - SUBJECTIVE AND OBJECTIVE BOX
73F, states shes very active  has had hip pain for a few days after twisting, unable to bear weight.  presented to the ER and was diagnosed with a displaced left femoral neck fracture.    on exam left leg unable to move  skin normal  nvid  denies pain bl ue or rle    xrays demonstrate left displaced femoral neck fracture    discusses with patient  needs operative treatment  silvia vs total hip replacement    procedure discussed in detail  expected outcome and projected rehab and healing time dicussed  risks including but not limited to bleeding, pain, infection, fracture, nerve palsy, dislocation, leg length discrepancy, need for revision discussed.    please make npo after midnight  medical evaluation for optimization pre op  would be able to do case tomorrow morning at 7:30, OR aware.

## 2024-01-12 NOTE — ED PROVIDER NOTE - PROGRESS NOTE DETAILS
AY: received s/o from MARILEE Hernandez at 2:30. Consulted ortho re: cortical irregularity at left femoral neck suspicious for fracture. Orthopedics stated will call back after viewing images. AY: called orthopedics at [phone] with no answer. [place] ortho recommending CT scan, patient states she has severe anxiety from closed spaces and cannot have CT scan performed without sedation. AY: called orthopedics at [phone] with no answer. [place] ortho recommending CT scan, patient states she has severe anxiety from closed spaces and cannot have CT scan performed without sedation. AY: patient received 2mg ativan on 5/27/22 for anxiety prior to obtaining CT scan. Same dose ordered today. AY: orthopedics [place] requested CT hip not femur - order changed. AY: Per ortho, admit to medicine at [facility], requested va duplex lower extremities which Dr. Marquise Quiles will follow-up as well as ekg, cxr, and T&S AY: Per ortho, admit to medicine at [facility], requested va duplex lower extremities which Dr. Marquise Quiles will follow-up as well as ekg, cxr, and T&S

## 2024-01-12 NOTE — H&P ADULT - NSHPLABSRESULTS_GEN_ALL_CORE
12.7   8.25  )-----------( 382      ( 12 Jan 2024 14:11 )             38.6       01-12    138  |  98  |  17  ----------------------------<  72  4.2   |  32  |  0.5<L>    Ca    9.3      12 Jan 2024 14:11    TPro  6.4  /  Alb  3.9  /  TBili  0.2  /  DBili  x   /  AST  25  /  ALT  28  /  AlkPhos  108  01-12                  Urinalysis Basic - ( 12 Jan 2024 14:11 )    Color: x / Appearance: x / SG: x / pH: x  Gluc: 72 mg/dL / Ketone: x  / Bili: x / Urobili: x   Blood: x / Protein: x / Nitrite: x   Leuk Esterase: x / RBC: x / WBC x   Sq Epi: x / Non Sq Epi: x / Bacteria: x            RADIOLOGY:  < from: CT Hip No Cont, Left (01.12.24 @ 16:03) >    IMPRESSION:    Displaced mid cervical left femoral neck fracture. No underlying lesion   identified tosuggest a pathologic fracture.    --- End of Report ---          DWAYNE NIÑO MD; Resident Radiologist  This document has been electronically signed.  ALEJANDRO COKER MD; Attending Radiologist  This document has been electronically signed. Jan 12 2024  4:44PM    < end of copied text >    < from: Xray Femur 2 Views, Left (01.12.24 @ 14:36) >    impression:    Minimally displaced left femoral neck fracture.    Degenerative osseous changes.    --- End of Report ---            BRAYAN ALEXANDER MD; Attending Radiologist  This document has been electronically signed. Jan 12 2024  5:12PM    < end of copied text >    < from: Xray Hip 2-3 Views, Left (01.12.24 @ 14:36) >    IMPRESSION: Fractured left femur questionably pathological    --- End of Report ---            PATRICIO WELSEY MD; Attending Radiologist  This document has been electronically signed. Jan 12 2024  2:50PM    < end of copied text > 12.7   8.25  )-----------( 382      ( 12 Jan 2024 14:11 )             38.6       01-12    138  |  98  |  17  ----------------------------<  72  4.2   |  32  |  0.5<L>    Ca    9.3      12 Jan 2024 14:11    TPro  6.4  /  Alb  3.9  /  TBili  0.2  /  DBili  x   /  AST  25  /  ALT  28  /  AlkPhos  108  01-12                  Urinalysis Basic - ( 12 Jan 2024 14:11 )    Color: x / Appearance: x / SG: x / pH: x  Gluc: 72 mg/dL / Ketone: x  / Bili: x / Urobili: x   Blood: x / Protein: x / Nitrite: x   Leuk Esterase: x / RBC: x / WBC x   Sq Epi: x / Non Sq Epi: x / Bacteria: x            RADIOLOGY:  < from: CT Hip No Cont, Left (01.12.24 @ 16:03) >    IMPRESSION:    Displaced mid cervical left femoral neck fracture. No underlying lesion   identified tosuggest a pathologic fracture.    --- End of Report ---          DWAYNE NIÑO MD; Resident Radiologist  This document has been electronically signed.  ALEJANDRO COKER MD; Attending Radiologist  This document has been electronically signed. Jan 12 2024  4:44PM    < end of copied text >    < from: Xray Femur 2 Views, Left (01.12.24 @ 14:36) >    impression:    Minimally displaced left femoral neck fracture.    Degenerative osseous changes.    --- End of Report ---            BRAYAN ALEXANDER MD; Attending Radiologist  This document has been electronically signed. Jan 12 2024  5:12PM    < end of copied text >    < from: Xray Hip 2-3 Views, Left (01.12.24 @ 14:36) >    IMPRESSION: Fractured left femur questionably pathological    --- End of Report ---            PATRICIO WESLEY MD; Attending Radiologist  This document has been electronically signed. Jan 12 2024  2:50PM    < end of copied text >

## 2024-01-12 NOTE — H&P ADULT - NSHPPHYSICALEXAM_GEN_ALL_CORE
T(C): 37.1 (01-12-24 @ 13:45), Max: 37.1 (01-12-24 @ 13:45)  HR: 77 (01-12-24 @ 13:45) (77 - 77)  BP: 191/89 (01-12-24 @ 13:45) (191/89 - 191/89)  RR: 18 (01-12-24 @ 13:45) (18 - 18)  SpO2: 95% (01-12-24 @ 13:45) (95% - 95%)    PHYSICAL EXAM:  GENERAL: NAD, well-developed, well nourished, looks stated age  HEAD:  Atraumatic, Normocephalic  EYES: EOMI, PERRLA, conjunctiva and sclera clear  NECK: Supple, No JVD, no bruits, no masses, no thyroid enlargement  ENMT: No tonsillar erythema, exudated or enlargement, moist mucous membranes  CHEST/LUNG: Clear to auscultation bilaterally; No rales, rhonchi or wheezing, no dullness to percussion  HEART: S1,S2 Regular rate and rhythm; No murmurs, rubs, or gallops  ABDOMEN: Soft, nontender, nondistended, no rebound tenderness; No palpable masses, no hernias, no organomegaly; Bowel sounds present and normoactive  EXTREMITIES:  2+ peripheral pulses bilaterally and symmetrically, no clubbing, cyanosis, or edema, LLE shortened and externally roatated  LYMPH: No lymphadenopathy  PSYCH: AAOx3, normal mood and affect  NEUROLOGY: non-focal, muscle strength 5/5 all extremities, DTRs 2+ symmetrically  SKIN: No rashes or lesions

## 2024-01-12 NOTE — CHART NOTE - NSCHARTNOTEFT_GEN_A_CORE
called by ed   displaced femoral neck fracture   discussed with hip moises   agreed to do case tomorrow pending clearance   admit [place] to medicine   ortho to see consult called by ed   displaced femoral neck fracture   discussed with hip moises   agreed to do case tomorrow pending clearance   admit south to medicine   ortho to see consult  npo at midnight   duplex r/o dvt as fx is week old   dvt proph tonight hold day of surgery   please obtain clearance tonight if possible

## 2024-01-12 NOTE — H&P ADULT - HISTORY OF PRESENT ILLNESS
74yo female with PMHx TIA , HTN. HLD and KEVEN - on no meds as pt states she stopped them all bc she felt like she was in heart failure and now takes natural supplements instead. Pt has not seen a PMD, Cardio or Neuro in over a year.     Pt presents after a fall at home 1 week ago. She admits to L hip and pelvic pain and inability to ambulate. Found to have a displaced hip fracture.  72yo female with PMHx TIA , HTN. HLD and KEVEN - on no meds as pt states she stopped them all bc she felt like she was in heart failure and now takes natural supplements instead. Pt has not seen a PMD, Cardio or Neuro in over a year.     Pt presents after a fall at home 1 week ago. She admits to L hip and pelvic pain and inability to ambulate. Found to have a displaced hip fracture.

## 2024-01-12 NOTE — ED PROVIDER NOTE - PHYSICAL EXAMINATION
Physical Exam    Vital Signs: I have reviewed the initial vital signs.  Constitutional: appears stated age, no acute distress  Eyes: Conjunctiva pink, Sclera clear  Cardiovascular: S1 and S2, regular rate, regular rhythm, well-perfused extremities, radial pulses equal and 2+, pedal pulses 2+ and equal  Respiratory: unlabored respiratory effort, clear to auscultation bilaterally no wheezing, rales and rhonchi  Gastrointestinal: soft, non-tender abdomen, no pulsatile mass, normal bowl sounds  Musculoskeletal: supple neck, no lower extremity edema, no midline tenderness, ttp left hip rom limited 2/2 pain.   Integumentary: warm, dry, no rash  Neurologic: awake, alert, nvi
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
## 2024-01-12 NOTE — PATIENT PROFILE ADULT - IS PATIENT POST-MENOPAUSAL?
continue fentanyl patch Q72h  Dilaudid 2q2 with 1.5 for breakthrough  pain management following  eventual transition to PO yes

## 2024-01-12 NOTE — H&P ADULT - NSICDXPASTMEDICALHX_GEN_ALL_CORE_FT
PAST MEDICAL HISTORY:  GERD (gastroesophageal reflux disease)     History of TIAs     HLD (hyperlipidemia)     HTN (hypertension)

## 2024-01-12 NOTE — ED PROVIDER NOTE - OBJECTIVE STATEMENT
74 yo female, pmh of elvira, tia on plavix, htn, hld, p/w left hip pain, fell one week ago, since unable to ambulate, mild, aching, no radiation. Denies fever, chills, cp, sob, neck pain, visual changes, nvd, dizziness, numbness, tingling.

## 2024-01-12 NOTE — ED PROVIDER NOTE - CLINICAL SUMMARY MEDICAL DECISION MAKING FREE TEXT BOX
I, Warner Pruitt, have personally seen and examined this patient. I have fully participated in the care of this patient. I have made amendments to the documentation where appropriate and otherwise agree with the history, physical exam, and plan as documented by the PA    Patient past medical history of TIA on Plavix presents with left hip pain for 1 week after injury fell on left hip CTs reviewed x-rays personally reviewed by me shows fracture of femoral head    Discussed case with orthopedics will admit for surgery    Appropriate medications for patient's presenting complaints were ordered and effects were reassessed.   Patient's external records were reviewed..    Escalation to admission/observation was considered.  At this time, patient requires inpatient hospitalization.

## 2024-01-12 NOTE — PATIENT PROFILE ADULT - FALL HARM RISK - HARM RISK INTERVENTIONS
Assistance OOB with selected safe patient handling equipment/Communicate Risk of Fall with Harm to all staff/Discuss with provider need for PT consult/Monitor gait and stability/Provide patient with walking aids - walker, cane, crutches/Reinforce activity limits and safety measures with patient and family/Tailored Fall Risk Interventions/Visual Cue: Yellow wristband and red socks/Bed in lowest position, wheels locked, appropriate side rails in place/Call bell, personal items and telephone in reach/Instruct patient to call for assistance before getting out of bed or chair/Non-slip footwear when patient is out of bed/[place] to call system/Physically safe environment - no spills, clutter or unnecessary equipment/Purposeful Proactive Rounding/Room/bathroom lighting operational, light cord in reach Assistance OOB with selected safe patient handling equipment/Communicate Risk of Fall with Harm to all staff/Discuss with provider need for PT consult/Monitor gait and stability/Provide patient with walking aids - walker, cane, crutches/Reinforce activity limits and safety measures with patient and family/Tailored Fall Risk Interventions/Visual Cue: Yellow wristband and red socks/Bed in lowest position, wheels locked, appropriate side rails in place/Call bell, personal items and telephone in reach/Instruct patient to call for assistance before getting out of bed or chair/Non-slip footwear when patient is out of bed/[place] to call system/Physically safe environment - no spills, clutter or unnecessary equipment/Purposeful Proactive Rounding/Room/bathroom lighting operational, light cord in reach

## 2024-01-13 ENCOUNTER — RESULT REVIEW (OUTPATIENT)
Age: 74
End: 2024-01-13

## 2024-01-13 PROCEDURE — 27130 TOTAL HIP ARTHROPLASTY: CPT | Mod: LT

## 2024-01-13 PROCEDURE — 88311 DECALCIFY TISSUE: CPT | Mod: 26

## 2024-01-13 PROCEDURE — 88305 TISSUE EXAM BY PATHOLOGIST: CPT | Mod: 26

## 2024-01-13 PROCEDURE — 73502 X-RAY EXAM HIP UNI 2-3 VIEWS: CPT | Mod: 26,LT

## 2024-01-13 PROCEDURE — 99232 SBSQ HOSP IP/OBS MODERATE 35: CPT

## 2024-01-13 RX ORDER — POLYETHYLENE GLYCOL 3350 17 G/17G
17 POWDER, FOR SOLUTION ORAL AT BEDTIME
Refills: 0 | Status: DISCONTINUED | OUTPATIENT
Start: 2024-01-13 | End: 2024-01-15

## 2024-01-13 RX ORDER — MAGNESIUM HYDROXIDE 400 MG/1
30 TABLET, CHEWABLE ORAL DAILY
Refills: 0 | Status: DISCONTINUED | OUTPATIENT
Start: 2024-01-13 | End: 2024-01-15

## 2024-01-13 RX ORDER — SENNA PLUS 8.6 MG/1
2 TABLET ORAL AT BEDTIME
Refills: 0 | Status: DISCONTINUED | OUTPATIENT
Start: 2024-01-13 | End: 2024-01-15

## 2024-01-13 RX ORDER — SODIUM CHLORIDE 9 MG/ML
1000 INJECTION INTRAMUSCULAR; INTRAVENOUS; SUBCUTANEOUS
Refills: 0 | Status: DISCONTINUED | OUTPATIENT
Start: 2024-01-13 | End: 2024-01-15

## 2024-01-13 RX ORDER — SODIUM CHLORIDE 9 MG/ML
1000 INJECTION, SOLUTION INTRAVENOUS
Refills: 0 | Status: DISCONTINUED | OUTPATIENT
Start: 2024-01-13 | End: 2024-01-13

## 2024-01-13 RX ORDER — CHLORHEXIDINE GLUCONATE 213 G/1000ML
1 SOLUTION TOPICAL
Refills: 0 | Status: DISCONTINUED | OUTPATIENT
Start: 2024-01-13 | End: 2024-01-15

## 2024-01-13 RX ORDER — ACETAMINOPHEN 500 MG
650 TABLET ORAL EVERY 6 HOURS
Refills: 0 | Status: DISCONTINUED | OUTPATIENT
Start: 2024-01-13 | End: 2024-01-15

## 2024-01-13 RX ORDER — ENOXAPARIN SODIUM 100 MG/ML
40 INJECTION SUBCUTANEOUS EVERY 24 HOURS
Refills: 0 | Status: DISCONTINUED | OUTPATIENT
Start: 2024-01-13 | End: 2024-01-15

## 2024-01-13 RX ORDER — ONDANSETRON 8 MG/1
4 TABLET, FILM COATED ORAL EVERY 8 HOURS
Refills: 0 | Status: DISCONTINUED | OUTPATIENT
Start: 2024-01-13 | End: 2024-01-15

## 2024-01-13 RX ORDER — KETOROLAC TROMETHAMINE 30 MG/ML
15 SYRINGE (ML) INJECTION EVERY 6 HOURS
Refills: 0 | Status: DISCONTINUED | OUTPATIENT
Start: 2024-01-13 | End: 2024-01-14

## 2024-01-13 RX ORDER — PANTOPRAZOLE SODIUM 20 MG/1
40 TABLET, DELAYED RELEASE ORAL
Refills: 0 | Status: DISCONTINUED | OUTPATIENT
Start: 2024-01-13 | End: 2024-01-15

## 2024-01-13 RX ORDER — OXYCODONE HYDROCHLORIDE 5 MG/1
5 TABLET ORAL EVERY 8 HOURS
Refills: 0 | Status: DISCONTINUED | OUTPATIENT
Start: 2024-01-13 | End: 2024-01-15

## 2024-01-13 RX ORDER — ONDANSETRON 8 MG/1
4 TABLET, FILM COATED ORAL ONCE
Refills: 0 | Status: DISCONTINUED | OUTPATIENT
Start: 2024-01-13 | End: 2024-01-13

## 2024-01-13 RX ORDER — MORPHINE SULFATE 50 MG/1
2 CAPSULE, EXTENDED RELEASE ORAL ONCE
Refills: 0 | Status: DISCONTINUED | OUTPATIENT
Start: 2024-01-13 | End: 2024-01-13

## 2024-01-13 RX ORDER — TRAMADOL HYDROCHLORIDE 50 MG/1
50 TABLET ORAL EVERY 4 HOURS
Refills: 0 | Status: DISCONTINUED | OUTPATIENT
Start: 2024-01-13 | End: 2024-01-15

## 2024-01-13 RX ORDER — AMLODIPINE BESYLATE 2.5 MG/1
10 TABLET ORAL DAILY
Refills: 0 | Status: DISCONTINUED | OUTPATIENT
Start: 2024-01-13 | End: 2024-01-15

## 2024-01-13 RX ORDER — LANOLIN ALCOHOL/MO/W.PET/CERES
3 CREAM (GRAM) TOPICAL AT BEDTIME
Refills: 0 | Status: DISCONTINUED | OUTPATIENT
Start: 2024-01-13 | End: 2024-01-15

## 2024-01-13 RX ORDER — CEFAZOLIN SODIUM 1 G
2000 VIAL (EA) INJECTION EVERY 8 HOURS
Refills: 0 | Status: COMPLETED | OUTPATIENT
Start: 2024-01-13 | End: 2024-01-13

## 2024-01-13 RX ORDER — HYDROMORPHONE HYDROCHLORIDE 2 MG/ML
0.5 INJECTION INTRAMUSCULAR; INTRAVENOUS; SUBCUTANEOUS
Refills: 0 | Status: DISCONTINUED | OUTPATIENT
Start: 2024-01-13 | End: 2024-01-13

## 2024-01-13 RX ADMIN — Medication 15 MILLIGRAM(S): at 13:47

## 2024-01-13 RX ADMIN — MORPHINE SULFATE 2 MILLIGRAM(S): 50 CAPSULE, EXTENDED RELEASE ORAL at 00:33

## 2024-01-13 RX ADMIN — Medication 100 MILLIGRAM(S): at 14:15

## 2024-01-13 RX ADMIN — Medication 650 MILLIGRAM(S): at 23:42

## 2024-01-13 RX ADMIN — Medication 15 MILLIGRAM(S): at 19:47

## 2024-01-13 RX ADMIN — Medication 15 MILLIGRAM(S): at 22:33

## 2024-01-13 RX ADMIN — SODIUM CHLORIDE 100 MILLILITER(S): 9 INJECTION, SOLUTION INTRAVENOUS at 10:50

## 2024-01-13 RX ADMIN — Medication 650 MILLIGRAM(S): at 17:28

## 2024-01-13 RX ADMIN — Medication 650 MILLIGRAM(S): at 13:48

## 2024-01-13 RX ADMIN — Medication 650 MILLIGRAM(S): at 22:33

## 2024-01-13 RX ADMIN — Medication 100 MILLIGRAM(S): at 22:35

## 2024-01-13 RX ADMIN — Medication 15 MILLIGRAM(S): at 23:42

## 2024-01-13 RX ADMIN — MORPHINE SULFATE 2 MILLIGRAM(S): 50 CAPSULE, EXTENDED RELEASE ORAL at 06:41

## 2024-01-13 RX ADMIN — Medication 650 MILLIGRAM(S): at 17:03

## 2024-01-13 RX ADMIN — Medication 15 MILLIGRAM(S): at 18:51

## 2024-01-13 RX ADMIN — SODIUM CHLORIDE 75 MILLILITER(S): 9 INJECTION INTRAMUSCULAR; INTRAVENOUS; SUBCUTANEOUS at 13:47

## 2024-01-13 NOTE — OCCUPATIONAL THERAPY INITIAL EVALUATION ADULT - SPECIFY REASON(S)
Chart reviewed by Occupational Therapist. As per RN Arlette Pt is groggy and not appropriate for therapy at this time. OT to follow up when appropriate.
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
## 2024-01-13 NOTE — PHYSICAL THERAPY INITIAL EVALUATION ADULT - MANUAL MUSCLE TESTING RESULTS, REHAB EVAL
RLE ms  strength grossly graded 3 to 3+/5; (L) hip 3-/5; (L) knee/ankle 3- to 3/5; Please refer to OT joshua for BUE

## 2024-01-13 NOTE — PHYSICAL THERAPY INITIAL EVALUATION ADULT - ACTIVE RANGE OF MOTION EXAMINATION, REHAB EVAL
LLE required AAROM /AROM  with (L) hip flexion 0-75 degrees and (L) hip abduction 0-15 degrees, in supine, Please refer to OT eval for BUE/Right LE Active ROM was WFL (within functional limits)

## 2024-01-13 NOTE — PHYSICAL THERAPY INITIAL EVALUATION ADULT - GENERAL OBSERVATIONS, REHAB EVAL
13:45-14:15 Chart reviewed. Pt encountered semireclined in bed, may be seen by Physical Therapist as confirmed with Nurse. Patient denied pain and would like to try to walk to bathroom but still "feel nauseous"; Aquacel (L) hip/compression socks/ heplock RUE

## 2024-01-13 NOTE — PROGRESS NOTE ADULT - SUBJECTIVE AND OBJECTIVE BOX
CRISTINA STRANGE  73y  [facility]      Patient is a 73y old  Female who presents with a chief complaint of L hip fracture (12 Jan 2024 17:38)      INTERVAL HPI/OVERNIGHT EVENTS:        REVIEW OF SYSTEMS:        FAMILY HISTORY:  No pertinent family history in first degree relatives      T(C): 36.7 (01-13-24 @ 07:54), Max: 37.1 (01-12-24 @ 13:45)  HR: 99 (01-13-24 @ 07:54) (77 - 99)  BP: 187/93 (01-13-24 @ 07:54) (174/78 - 191/89)  RR: 18 (01-13-24 @ 07:54) (18 - 18)  SpO2: 94% (01-13-24 @ 07:54) (94% - 98%)  Wt(kg): --Vital Signs Last 24 Hrs  T(C): 36.7 (13 Jan 2024 07:54), Max: 37.1 (12 Jan 2024 13:45)  T(F): 98 (13 Jan 2024 04:49), Max: 98.7 (12 Jan 2024 13:45)  HR: 99 (13 Jan 2024 07:54) (77 - 99)  BP: 187/93 (13 Jan 2024 07:54) (174/78 - 191/89)  BP(mean): --  RR: 18 (13 Jan 2024 07:54) (18 - 18)  SpO2: 94% (13 Jan 2024 07:54) (94% - 98%)    Parameters below as of 12 Jan 2024 21:10  Patient On (Oxygen Delivery Method): room air        PHYSICAL EXAM:  GENERAL: NAD, well-groomed, well-developed  HEAD:  Atraumatic, Normocephalic  EYES: EOMI, PERRLA, conjunctiva and sclera clear  ENMT: No tonsillar erythema, exudates, or enlargement; Moist mucous membranes, Good dentition, No lesions  NECK: Supple, No JVD, Normal thyroid  NERVOUS SYSTEM:  Alert & Oriented X3, Good concentration; Motor Strength 5/5 B/L upper and lower extremities; DTRs 2+ intact and symmetric  PULM: Clear to auscultation bilaterally  CARDIAC: Regular rate and rhythm; No murmurs, rubs, or gallops  GI: Soft, Nontender, Nondistended; Bowel sounds present  EXTREMITIES:  2+ Peripheral Pulses, No clubbing, cyanosis, or edema  LYMPH: No lymphadenopathy noted  SKIN: No rashes or lesions    Consultant(s) Notes Reviewed:  [x ] YES  [ ] NO  Care Discussed with Consultants/Other Providers [ x] YES  [ ] NO    LABS:                            12.7   8.25  )-----------( 382      ( 12 Jan 2024 14:11 )             38.6   01-12    138  |  98  |  17  ----------------------------<  72  4.2   |  32  |  0.5<L>    Ca    9.3      12 Jan 2024 14:11    TPro  6.4  /  Alb  3.9  /  TBili  0.2  /  DBili  x   /  AST  25  /  ALT  28  /  AlkPhos  108  01-12        74 yo female with PMHx TIA, HTN, HLD, KEVEN admitted with L displaced hip fracture      1.Mechanical fall complicated by L hip fracture   - Admit to medicine   - Lovenox px daily   - NPO after midnight   - Pain meds prn                  - laxative   - Venous duplex:pending   - NWB, bedrest  - CHG as pt going for surgery   - Cardio eval   - Ortho consult: Plan for surgery no time yet.     2. H/O TIA  - Hgba1c:pending           - pt not taking any meds (Was on aspirin and statin)     3. HTN uncontrolled   * pt stopped taking meds on her own   - Start amlodipine 10mg po daily     4. HLD  - check lipids    5. DVT Proph  - Lovenox daily    Patient is usually active ( Walk holding >25 lbs of grocery for half mile). denies any active chest pain, sob. she doesn't use insulin. she likely   has uncontrolled hypertension and lipid profile. she needs urgent surgery . her revise cardiac index is 1. She is medically clear as the benefit exceed the risks but no optimized medically as pt was not taking meds for her chronic condition .             CRISTINA STRANGE  73y  [facility] 001 1      Patient is a 73y old  Female who presents with a chief complaint of L hip fracture (12 Jan 2024 17:38)      INTERVAL HPI/OVERNIGHT EVENTS:        REVIEW OF SYSTEMS:        FAMILY HISTORY:  No pertinent family history in first degree relatives      T(C): 36.7 (01-13-24 @ 07:54), Max: 37.1 (01-12-24 @ 13:45)  HR: 99 (01-13-24 @ 07:54) (77 - 99)  BP: 187/93 (01-13-24 @ 07:54) (174/78 - 191/89)  RR: 18 (01-13-24 @ 07:54) (18 - 18)  SpO2: 94% (01-13-24 @ 07:54) (94% - 98%)  Wt(kg): --Vital Signs Last 24 Hrs  T(C): 36.7 (13 Jan 2024 07:54), Max: 37.1 (12 Jan 2024 13:45)  T(F): 98 (13 Jan 2024 04:49), Max: 98.7 (12 Jan 2024 13:45)  HR: 99 (13 Jan 2024 07:54) (77 - 99)  BP: 187/93 (13 Jan 2024 07:54) (174/78 - 191/89)  BP(mean): --  RR: 18 (13 Jan 2024 07:54) (18 - 18)  SpO2: 94% (13 Jan 2024 07:54) (94% - 98%)    Parameters below as of 12 Jan 2024 21:10  Patient On (Oxygen Delivery Method): room air        PHYSICAL EXAM:  GENERAL: NAD, well-groomed, well-developed  HEAD:  Atraumatic, Normocephalic  EYES: EOMI, PERRLA, conjunctiva and sclera clear  ENMT: No tonsillar erythema, exudates, or enlargement; Moist mucous membranes, Good dentition, No lesions  NECK: Supple, No JVD, Normal thyroid  NERVOUS SYSTEM:  Alert & Oriented X3, Good concentration; Motor Strength 5/5 B/L upper and lower extremities; DTRs 2+ intact and symmetric  PULM: Clear to auscultation bilaterally  CARDIAC: Regular rate and rhythm; No murmurs, rubs, or gallops  GI: Soft, Nontender, Nondistended; Bowel sounds present  EXTREMITIES:  2+ Peripheral Pulses, No clubbing, cyanosis, or edema  LYMPH: No lymphadenopathy noted  SKIN: No rashes or lesions    Consultant(s) Notes Reviewed:  [x ] YES  [ ] NO  Care Discussed with Consultants/Other Providers [ x] YES  [ ] NO    LABS:                            12.7   8.25  )-----------( 382      ( 12 Jan 2024 14:11 )             38.6   01-12    138  |  98  |  17  ----------------------------<  72  4.2   |  32  |  0.5<L>    Ca    9.3      12 Jan 2024 14:11    TPro  6.4  /  Alb  3.9  /  TBili  0.2  /  DBili  x   /  AST  25  /  ALT  28  /  AlkPhos  108  01-12        74 yo female with PMHx TIA, HTN, HLD, KEVEN admitted with L displaced hip fracture      1.Mechanical fall complicated by L hip fracture   - Admit to medicine   - Lovenox px daily   - NPO after midnight   - Pain meds prn                  - laxative   - Venous duplex:pending   - NWB, bedrest  - CHG as pt going for surgery   - Cardio eval   - Ortho consult: Plan for surgery no time yet.     2. H/O TIA  - Hgba1c:pending           - pt not taking any meds (Was on aspirin and statin)     3. HTN uncontrolled   * pt stopped taking meds on her own   - Start amlodipine 10mg po daily     4. HLD  - check lipids    5. DVT Proph  - Lovenox daily    Patient is usually active ( Walk holding >25 lbs of grocery for half mile). denies any active chest pain, sob. she doesn't use insulin. she likely   has uncontrolled hypertension and lipid profile. she needs urgent surgery . her revise cardiac index is 1. She is medically clear as the benefit exceed the risks but no optimized medically as pt was not taking meds for her chronic condition .             CRISTINA STRANGE  73[facility]  [facility] 001 1      Patient is a 73y old  Female who presents with a chief complaint of L hip fracture (12 Jan 2024 17:38)      INTERVAL HPI/OVERNIGHT EVENTS:    patient looks comfortable. she has no complains. no overnight events.           FAMILY HISTORY:  No pertinent family history in first degree relatives      T(C): 36.7 (01-13-24 @ 07:54), Max: 37.1 (01-12-24 @ 13:45)  HR: 99 (01-13-24 @ 07:54) (77 - 99)  BP: 187/93 (01-13-24 @ 07:54) (174/78 - 191/89)  RR: 18 (01-13-24 @ 07:54) (18 - 18)  SpO2: 94% (01-13-24 @ 07:54) (94% - 98%)  Wt(kg): --Vital Signs Last 24 Hrs  T(C): 36.7 (13 Jan 2024 07:54), Max: 37.1 (12 Jan 2024 13:45)  T(F): 98 (13 Jan 2024 04:49), Max: 98.7 (12 Jan 2024 13:45)  HR: 99 (13 Jan 2024 07:54) (77 - 99)  BP: 187/93 (13 Jan 2024 07:54) (174/78 - 191/89)  BP(mean): --  RR: 18 (13 Jan 2024 07:54) (18 - 18)  SpO2: 94% (13 Jan 2024 07:54) (94% - 98%)    Parameters below as of 12 Jan 2024 21:10  Patient On (Oxygen Delivery Method): room air        PHYSICAL EXAM:  GENERAL: NAD, well-groomed, well-developed  NERVOUS SYSTEM:  Alert & Oriented X3,  PULM: Clear to auscultation bilaterally  CARDIAC: Regular rate and rhythm;  GI: Soft, Nontender, Nondistended; Bowel sounds present  EXTREMITIES:  2+ Peripheral Pulses,     Consultant(s) Notes Reviewed:  [x ] YES  [ ] NO  Care Discussed with Consultants/Other Providers [ x] YES  [ ] NO    LABS:                            12.7   8.25  )-----------( 382      ( 12 Jan 2024 14:11 )             38.6   01-12    138  |  98  |  17  ----------------------------<  72  4.2   |  32  |  0.5<L>    Ca    9.3      12 Jan 2024 14:11    TPro  6.4  /  Alb  3.9  /  TBili  0.2  /  DBili  x   /  AST  25  /  ALT  28  /  AlkPhos  108  01-12        72 yo female with PMHx TIA, HTN, HLD, KEVEN admitted with L displaced hip fracture      1.Mechanical fall complicated by L hip fracture s/p ORIF  * no problem urinating post-op   - Admit to medicine   - Lovenox px daily    - Pain meds prn                  - laxative   - Venous duplex:WNL   - NWB, bedrest  - CHG as pt going for surgery   - No need for cardio eval   - Ortho consult: Plan for surgery no time yet.     2. H/O TIA  - Hgba1c:pending           - pt not taking any meds (Was on aspirin and statin)     3. HTN uncontrolled   * pt stopped taking meds on her own   - Start amlodipine 10mg po daily     4. HLD  - check lipids    5. DVT Proph  - Lovenox daily             CRISTINA STRANGE  73[facility]      Patient is a 73y old  Female who presents with a chief complaint of L hip fracture (12 Jan 2024 17:38)      INTERVAL HPI/OVERNIGHT EVENTS:    patient looks comfortable. she has no complains. no overnight events.           FAMILY HISTORY:  No pertinent family history in first degree relatives      T(C): 36.7 (01-13-24 @ 07:54), Max: 37.1 (01-12-24 @ 13:45)  HR: 99 (01-13-24 @ 07:54) (77 - 99)  BP: 187/93 (01-13-24 @ 07:54) (174/78 - 191/89)  RR: 18 (01-13-24 @ 07:54) (18 - 18)  SpO2: 94% (01-13-24 @ 07:54) (94% - 98%)  Wt(kg): --Vital Signs Last 24 Hrs  T(C): 36.7 (13 Jan 2024 07:54), Max: 37.1 (12 Jan 2024 13:45)  T(F): 98 (13 Jan 2024 04:49), Max: 98.7 (12 Jan 2024 13:45)  HR: 99 (13 Jan 2024 07:54) (77 - 99)  BP: 187/93 (13 Jan 2024 07:54) (174/78 - 191/89)  BP(mean): --  RR: 18 (13 Jan 2024 07:54) (18 - 18)  SpO2: 94% (13 Jan 2024 07:54) (94% - 98%)    Parameters below as of 12 Jan 2024 21:10  Patient On (Oxygen Delivery Method): room air        PHYSICAL EXAM:  GENERAL: NAD, well-groomed, well-developed  NERVOUS SYSTEM:  Alert & Oriented X3,  PULM: Clear to auscultation bilaterally  CARDIAC: Regular rate and rhythm;  GI: Soft, Nontender, Nondistended; Bowel sounds present  EXTREMITIES:  2+ Peripheral Pulses,     Consultant(s) Notes Reviewed:  [x ] YES  [ ] NO  Care Discussed with Consultants/Other Providers [ x] YES  [ ] NO    LABS:                            12.7   8.25  )-----------( 382      ( 12 Jan 2024 14:11 )             38.6   01-12    138  |  98  |  17  ----------------------------<  72  4.2   |  32  |  0.5<L>    Ca    9.3      12 Jan 2024 14:11    TPro  6.4  /  Alb  3.9  /  TBili  0.2  /  DBili  x   /  AST  25  /  ALT  28  /  AlkPhos  108  01-12        72 yo female with PMHx TIA, HTN, HLD, KEVEN admitted with L displaced hip fracture      1.Mechanical fall complicated by L hip fracture s/p ORIF  * no problem urinating post-op   - Admit to medicine   - Lovenox px daily    - Pain meds prn                  - laxative   - Venous duplex:WNL   - NWB, bedrest  - CHG as pt going for surgery   - No need for cardio eval   - Ortho consult: Plan for surgery no time yet.     2. H/O TIA  - Hgba1c:pending           - pt not taking any meds (Was on aspirin and statin)     3. HTN uncontrolled   * pt stopped taking meds on her own   - Start amlodipine 10mg po daily     4. HLD  - check lipids    5. DVT Proph  - Lovenox daily

## 2024-01-13 NOTE — PHYSICAL THERAPY INITIAL EVALUATION ADULT - ADDITIONAL COMMENTS
Per patient, they live in private home with no steps at home, was not using any assistive device but got a rolling walker after she fell about a week ago

## 2024-01-13 NOTE — PHYSICAL THERAPY INITIAL EVALUATION ADULT - GAIT DEVIATIONS NOTED, PT EVAL
stooped posture, dec heel strike/pushoff /stance on LLE/decreased rex/decreased step length/decreased weight-shifting ability

## 2024-01-14 ENCOUNTER — TRANSCRIPTION ENCOUNTER (OUTPATIENT)
Age: 74
End: 2024-01-14

## 2024-01-14 VITALS
OXYGEN SATURATION: 94 % | HEART RATE: 101 BPM | TEMPERATURE: 96 F | RESPIRATION RATE: 18 BRPM | DIASTOLIC BLOOD PRESSURE: 72 MMHG | SYSTOLIC BLOOD PRESSURE: 168 MMHG

## 2024-01-14 LAB
ANION GAP SERPL CALC-SCNC: 10 MMOL/L — SIGNIFICANT CHANGE UP (ref 7–14)
ANION GAP SERPL CALC-SCNC: 10 MMOL/L — SIGNIFICANT CHANGE UP (ref 7–14)
BUN SERPL-MCNC: 20 MG/DL — SIGNIFICANT CHANGE UP (ref 10–20)
BUN SERPL-MCNC: 20 MG/DL — SIGNIFICANT CHANGE UP (ref 10–20)
CALCIUM SERPL-MCNC: 8.6 MG/DL — SIGNIFICANT CHANGE UP (ref 8.4–10.5)
CALCIUM SERPL-MCNC: 8.6 MG/DL — SIGNIFICANT CHANGE UP (ref 8.4–10.5)
CHLORIDE SERPL-SCNC: 101 MMOL/L — SIGNIFICANT CHANGE UP (ref 98–110)
CHLORIDE SERPL-SCNC: 101 MMOL/L — SIGNIFICANT CHANGE UP (ref 98–110)
CO2 SERPL-SCNC: 28 MMOL/L — SIGNIFICANT CHANGE UP (ref 17–32)
CO2 SERPL-SCNC: 28 MMOL/L — SIGNIFICANT CHANGE UP (ref 17–32)
CREAT SERPL-MCNC: 0.6 MG/DL — LOW (ref 0.7–1.5)
CREAT SERPL-MCNC: 0.6 MG/DL — LOW (ref 0.7–1.5)
EGFR: 94 ML/MIN/1.73M2 — SIGNIFICANT CHANGE UP
EGFR: 94 ML/MIN/1.73M2 — SIGNIFICANT CHANGE UP
GLUCOSE SERPL-MCNC: 128 MG/DL — HIGH (ref 70–99)
GLUCOSE SERPL-MCNC: 128 MG/DL — HIGH (ref 70–99)
HCT VFR BLD CALC: 32.9 % — LOW (ref 37–47)
HCT VFR BLD CALC: 32.9 % — LOW (ref 37–47)
HGB BLD-MCNC: 10.9 G/DL — LOW (ref 12–16)
HGB BLD-MCNC: 10.9 G/DL — LOW (ref 12–16)
MCHC RBC-ENTMCNC: 29.9 PG — SIGNIFICANT CHANGE UP (ref 27–31)
MCHC RBC-ENTMCNC: 29.9 PG — SIGNIFICANT CHANGE UP (ref 27–31)
MCHC RBC-ENTMCNC: 33.1 G/DL — SIGNIFICANT CHANGE UP (ref 32–37)
MCHC RBC-ENTMCNC: 33.1 G/DL — SIGNIFICANT CHANGE UP (ref 32–37)
MCV RBC AUTO: 90.4 FL — SIGNIFICANT CHANGE UP (ref 81–99)
MCV RBC AUTO: 90.4 FL — SIGNIFICANT CHANGE UP (ref 81–99)
NRBC # BLD: 0 /100 WBCS — SIGNIFICANT CHANGE UP (ref 0–0)
NRBC # BLD: 0 /100 WBCS — SIGNIFICANT CHANGE UP (ref 0–0)
PLATELET # BLD AUTO: 316 K/UL — SIGNIFICANT CHANGE UP (ref 130–400)
PLATELET # BLD AUTO: 316 K/UL — SIGNIFICANT CHANGE UP (ref 130–400)
PMV BLD: 10.7 FL — HIGH (ref 7.4–10.4)
PMV BLD: 10.7 FL — HIGH (ref 7.4–10.4)
POTASSIUM SERPL-MCNC: 4.3 MMOL/L — SIGNIFICANT CHANGE UP (ref 3.5–5)
POTASSIUM SERPL-MCNC: 4.3 MMOL/L — SIGNIFICANT CHANGE UP (ref 3.5–5)
POTASSIUM SERPL-SCNC: 4.3 MMOL/L — SIGNIFICANT CHANGE UP (ref 3.5–5)
POTASSIUM SERPL-SCNC: 4.3 MMOL/L — SIGNIFICANT CHANGE UP (ref 3.5–5)
RBC # BLD: 3.64 M/UL — LOW (ref 4.2–5.4)
RBC # BLD: 3.64 M/UL — LOW (ref 4.2–5.4)
RBC # FLD: 14.2 % — SIGNIFICANT CHANGE UP (ref 11.5–14.5)
RBC # FLD: 14.2 % — SIGNIFICANT CHANGE UP (ref 11.5–14.5)
SODIUM SERPL-SCNC: 139 MMOL/L — SIGNIFICANT CHANGE UP (ref 135–146)
SODIUM SERPL-SCNC: 139 MMOL/L — SIGNIFICANT CHANGE UP (ref 135–146)
WBC # BLD: 10.5 K/UL — SIGNIFICANT CHANGE UP (ref 4.8–10.8)
WBC # BLD: 10.5 K/UL — SIGNIFICANT CHANGE UP (ref 4.8–10.8)
WBC # FLD AUTO: 10.5 K/UL — SIGNIFICANT CHANGE UP (ref 4.8–10.8)
WBC # FLD AUTO: 10.5 K/UL — SIGNIFICANT CHANGE UP (ref 4.8–10.8)

## 2024-01-14 PROCEDURE — 99239 HOSP IP/OBS DSCHRG MGMT >30: CPT

## 2024-01-14 RX ORDER — SENNA PLUS 8.6 MG/1
2 TABLET ORAL
Qty: 16 | Refills: 0
Start: 2024-01-14 | End: 2024-01-21

## 2024-01-14 RX ORDER — POLYETHYLENE GLYCOL 3350 17 G/17G
17 POWDER, FOR SOLUTION ORAL
Qty: 170 | Refills: 0
Start: 2024-01-14 | End: 2024-01-23

## 2024-01-14 RX ORDER — ASPIRIN/CALCIUM CARB/MAGNESIUM 324 MG
1 TABLET ORAL
Qty: 80 | Refills: 0
Start: 2024-01-14 | End: 2024-02-22

## 2024-01-14 RX ORDER — OXYCODONE HYDROCHLORIDE 5 MG/1
1 TABLET ORAL
Qty: 15 | Refills: 0
Start: 2024-01-14 | End: 2024-01-18

## 2024-01-14 RX ORDER — ACETAMINOPHEN 500 MG
2 TABLET ORAL
Qty: 40 | Refills: 0
Start: 2024-01-14 | End: 2024-01-18

## 2024-01-14 RX ADMIN — Medication 15 MILLIGRAM(S): at 06:44

## 2024-01-14 RX ADMIN — Medication 650 MILLIGRAM(S): at 17:41

## 2024-01-14 RX ADMIN — Medication 650 MILLIGRAM(S): at 05:23

## 2024-01-14 RX ADMIN — Medication 650 MILLIGRAM(S): at 06:44

## 2024-01-14 RX ADMIN — Medication 15 MILLIGRAM(S): at 05:23

## 2024-01-14 RX ADMIN — ENOXAPARIN SODIUM 40 MILLIGRAM(S): 100 INJECTION SUBCUTANEOUS at 12:11

## 2024-01-14 RX ADMIN — Medication 650 MILLIGRAM(S): at 12:11

## 2024-01-14 NOTE — DISCHARGE NOTE PROVIDER - CARE PROVIDER_API CALL
Jefferson-Agustín Nixon  Orthopaedic Surgery  [address] [address]  Phone: [phone]  Fax: [phone]  Established Patient  Follow Up Time: 1 week   Jefferson-Agustín Nixon  Orthopaedic Surgery  [address] [place] [address]  Phone: [phone]  Fax: [phone]  Established Patient  Follow Up Time: 1 week

## 2024-01-14 NOTE — PROGRESS NOTE ADULT - SUBJECTIVE AND OBJECTIVE BOX
CRISTINA STRANGE  73y  [facility] 001 1      Patient is a 73y old  Female who presents with a chief complaint of L hip fracture (12 Jan 2024 17:38)      INTERVAL HPI/OVERNIGHT EVENTS:    patient looks comfortable. she has no complains. no overnight events.           FAMILY HISTORY:  No pertinent family history in first degree relatives      T(C): 36.7 (01-13-24 @ 07:54), Max: 37.1 (01-12-24 @ 13:45)  HR: 99 (01-13-24 @ 07:54) (77 - 99)  BP: 187/93 (01-13-24 @ 07:54) (174/78 - 191/89)  RR: 18 (01-13-24 @ 07:54) (18 - 18)  SpO2: 94% (01-13-24 @ 07:54) (94% - 98%)  Wt(kg): --Vital Signs Last 24 Hrs  T(C): 36.7 (13 Jan 2024 07:54), Max: 37.1 (12 Jan 2024 13:45)  T(F): 98 (13 Jan 2024 04:49), Max: 98.7 (12 Jan 2024 13:45)  HR: 99 (13 Jan 2024 07:54) (77 - 99)  BP: 187/93 (13 Jan 2024 07:54) (174/78 - 191/89)  BP(mean): --  RR: 18 (13 Jan 2024 07:54) (18 - 18)  SpO2: 94% (13 Jan 2024 07:54) (94% - 98%)    Parameters below as of 12 Jan 2024 21:10  Patient On (Oxygen Delivery Method): room air        PHYSICAL EXAM:  GENERAL: NAD, well-groomed, well-developed  NERVOUS SYSTEM:  Alert & Oriented X3,  PULM: Clear to auscultation bilaterally  CARDIAC: Regular rate and rhythm;  GI: Soft, Nontender, Nondistended; Bowel sounds present  EXTREMITIES:  2+ Peripheral Pulses,     Consultant(s) Notes Reviewed:  [x ] YES  [ ] NO  Care Discussed with Consultants/Other Providers [ x] YES  [ ] NO    LABS:                            12.7   8.25  )-----------( 382      ( 12 Jan 2024 14:11 )             38.6   01-12    138  |  98  |  17  ----------------------------<  72  4.2   |  32  |  0.5<L>    Ca    9.3      12 Jan 2024 14:11    TPro  6.4  /  Alb  3.9  /  TBili  0.2  /  DBili  x   /  AST  25  /  ALT  28  /  AlkPhos  108  01-12        74 yo female with PMHx TIA, HTN, HLD, KEVEN admitted with L displaced hip fracture      1.Mechanical fall complicated by L hip fracture s/p ORIF  * no problem urinating post-op   - Admit to medicine   - Lovenox px daily    - Pain meds prn                  - laxative   - Venous duplex:WNL   - NWB, bedrest  - CHG as pt going for surgery   - No need for cardio eval   - Ortho consult appreciated  - PT eval appreciated:  a) STR vs home PT     2. H/O TIA  - Hgba1c:pending           - pt not taking any meds (Was on aspirin and statin)     3. HTN uncontrolled   * pt stopped taking meds on her own   - Start amlodipine 10mg po daily     4. HLD  - check lipids    5. DVT Proph  - Lovenox daily             CRISTINA STRANGE  73[facility]  [facility] 001 1      Patient is a 73y old  Female who presents with a chief complaint of L hip fracture (12 Jan 2024 17:38)      INTERVAL HPI/OVERNIGHT EVENTS:    patient looks comfortable. she has no complains. no overnight events.           FAMILY HISTORY:  No pertinent family history in first degree relatives      T(C): 36.7 (01-13-24 @ 07:54), Max: 37.1 (01-12-24 @ 13:45)  HR: 99 (01-13-24 @ 07:54) (77 - 99)  BP: 187/93 (01-13-24 @ 07:54) (174/78 - 191/89)  RR: 18 (01-13-24 @ 07:54) (18 - 18)  SpO2: 94% (01-13-24 @ 07:54) (94% - 98%)  Wt(kg): --Vital Signs Last 24 Hrs  T(C): 36.7 (13 Jan 2024 07:54), Max: 37.1 (12 Jan 2024 13:45)  T(F): 98 (13 Jan 2024 04:49), Max: 98.7 (12 Jan 2024 13:45)  HR: 99 (13 Jan 2024 07:54) (77 - 99)  BP: 187/93 (13 Jan 2024 07:54) (174/78 - 191/89)  BP(mean): --  RR: 18 (13 Jan 2024 07:54) (18 - 18)  SpO2: 94% (13 Jan 2024 07:54) (94% - 98%)    Parameters below as of 12 Jan 2024 21:10  Patient On (Oxygen Delivery Method): room air        PHYSICAL EXAM:  GENERAL: NAD, well-groomed, well-developed  NERVOUS SYSTEM:  Alert & Oriented X3,  PULM: Clear to auscultation bilaterally  CARDIAC: Regular rate and rhythm;  GI: Soft, Nontender, Nondistended; Bowel sounds present  EXTREMITIES:  2+ Peripheral Pulses,     Consultant(s) Notes Reviewed:  [x ] YES  [ ] NO  Care Discussed with Consultants/Other Providers [ x] YES  [ ] NO    LABS:                            12.7   8.25  )-----------( 382      ( 12 Jan 2024 14:11 )             38.6   01-12    138  |  98  |  17  ----------------------------<  72  4.2   |  32  |  0.5<L>    Ca    9.3      12 Jan 2024 14:11    TPro  6.4  /  Alb  3.9  /  TBili  0.2  /  DBili  x   /  AST  25  /  ALT  28  /  AlkPhos  108  01-12        74 yo female with PMHx TIA, HTN, HLD, KEVEN admitted with L displaced hip fracture      1.Mechanical fall complicated by L hip fracture s/p ORIF  * no problem urinating post-op   - Admit to medicine   - Lovenox px daily    - Pain meds prn                  - laxative   - Venous duplex:WNL   - NWB, bedrest  - CHG as pt going for surgery   - No need for cardio eval   - Ortho consult appreciated  - PT eval appreciated:  a) STR vs home PT     2. H/O TIA  - Hgba1c:pending           - pt not taking any meds (Was on aspirin and statin)     3. HTN uncontrolled   * pt stopped taking meds on her own   - Start amlodipine 10mg po daily     4. HLD  - check lipids    5. DVT Proph  - Lovenox daily

## 2024-01-14 NOTE — DISCHARGE NOTE PROVIDER - PROVIDER TOKENS
PROVIDER:[TOKEN:[[id number]],FOLLOWUP:[1 week],ESTABLISHEDPATIENT:[T]] PROVIDER:[TOKEN:[[id number]],FOLLOWUP:[1 week],ESTABLISHEDPATIENT:[T]]

## 2024-01-14 NOTE — DISCHARGE NOTE PROVIDER - NSDCCPCAREPLAN_GEN_ALL_CORE_FT
PRINCIPAL DISCHARGE DIAGNOSIS  Diagnosis: Femoral neck fracture  Assessment and Plan of Treatment: s/p ORIF. please take blood thinner as prescribed for at least 40 days .FOllow up with ortho team

## 2024-01-14 NOTE — DISCHARGE NOTE NURSING/CASE MANAGEMENT/SOCIAL WORK - PATIENT PORTAL LINK FT
You can access the FollowMyHealth Patient Portal offered by [facility] by registering at the following website: [email or web address]. By joining [employer]’s FollowMyHealth portal, you will also be able to view your health information using other applications (apps) compatible with our system. You can access the FollowMyHealth Patient Portal offered by [facility] by registering at the following website: [email or web address]. By joining [employer]’s FollowMyHealth portal, you will also be able to view your health information using other applications (apps) compatible with our system.

## 2024-01-14 NOTE — DISCHARGE NOTE PROVIDER - HOSPITAL COURSE
72 yo female with PMHx TIA, HTN, HLD, KEVEN admitted with L displaced hip fracture      1.Mechanical fall complicated by L hip fracture s/p ORIF  * no problem urinating post-op   - Admit to medicine   - Was on Lovenox px daily  . DC on xarelto 10mg po daily for 40 days   - Was on pain meds with laxative . DC on oxycodone and tylenol prn with laxative   - Venous duplex:WNL   - NWB, bedrest  - CHG as pt going for surgery   - No need for cardio eval   - Ortho consult appreciated  - PT eval appreciated:  a) STR vs home PT     2. H/O TIA  - Hgba1c need as outpatient         - pt not taking any meds (Was on aspirin and statin)     3. HTN uncontrolled   * pt stopped taking meds on her own   - Started on amlodipine but didn't take it. bp seems acceptable     4. HLD  - check lipids as outpatient     Patient feels well. She has no complains. her pain is well controlled . no overnight events 74 yo female with PMHx TIA, HTN, HLD, KEVEN admitted with L displaced hip fracture      1.Mechanical fall complicated by L hip fracture s/p ORIF  * no problem urinating post-op   - Admit to medicine   - Was on Lovenox px daily  . DC on xarelto 10mg po daily for 40 days   - Was on pain meds with laxative . DC on oxycodone and tylenol prn with laxative   - Venous duplex:WNL   - NWB, bedrest  - CHG as pt going for surgery   - No need for cardio eval   - Ortho consult appreciated  - PT eval appreciated:  a) STR vs home PT     2. H/O TIA  - Hgba1c need as outpatient         - pt not taking any meds (Was on aspirin and statin)     3. HTN uncontrolled   * pt stopped taking meds on her own   - Started on amlodipine but didn't take it. bp seems acceptable     4. HLD  - check lipids as outpatient     Patient feels well. She has no complains. her pain is well controlled . no overnight events 74 yo female with PMHx TIA, HTN, HLD, KEVEN admitted with L displaced hip fracture      1.Mechanical fall complicated by L hip fracture s/p ORIF  * no problem urinating post-op   - Admit to medicine   - Was on Lovenox px daily  . DC on aspirin 81mg po bid for 40 days. pt refused lovenox   * I personally emphasize the importance of taking this medication to prevent clots.   - Was on pain meds with laxative . DC on oxycodone and tylenol prn with laxative   - Venous duplex:WNL   - NWB, bedrest  - CHG as pt going for surgery   - No need for cardio eval   - Ortho consult appreciated  - PT eval appreciated:  a) STR vs home PT     2. H/O TIA  - Hgba1c need as outpatient         - pt not taking any meds (Was on aspirin and statin)     3. HTN uncontrolled   * pt stopped taking meds on her own   - Started on amlodipine but didn't take it. bp seems acceptable     4. HLD  - check lipids as outpatient     Patient feels well. She has no complains. her pain is well controlled . no overnight events 72 yo female with PMHx TIA, HTN, HLD, KEVEN admitted with L displaced hip fracture      1.Mechanical fall complicated by L hip fracture s/p ORIF  * no problem urinating post-op   - Admit to medicine   - Was on Lovenox px daily  . DC on aspirin 81mg po bid for 40 days. pt refused lovenox   * I personally emphasize the importance of taking this medication to prevent clots.   - Was on pain meds with laxative . DC on oxycodone and tylenol prn with laxative   - Venous duplex:WNL   - NWB, bedrest  - CHG as pt going for surgery   - No need for cardio eval   - Ortho consult appreciated  - PT eval appreciated:  a) STR vs home PT     2. H/O TIA  - Hgba1c need as outpatient         - pt not taking any meds (Was on aspirin and statin)     3. HTN uncontrolled   * pt stopped taking meds on her own   - Started on amlodipine but didn't take it. bp seems acceptable     4. HLD  - check lipids as outpatient     Patient feels well. She has no complains. her pain is well controlled . no overnight events

## 2024-01-14 NOTE — DISCHARGE NOTE NURSING/CASE MANAGEMENT/SOCIAL WORK - NSDCPEFALRISK_GEN_ALL_CORE
For information on Fall & Injury Prevention, visit: [email or web address]/[email or web address] OR  [email or web address]/[email or web address]-injury OR  https://www.cdc.gov/steadi/patient.html For information on Fall & Injury Prevention, visit: [email or web address] OR  [email or web address]-injury OR  https://www.cdc.gov/steadi/patient.html

## 2024-01-14 NOTE — DISCHARGE NOTE PROVIDER - NSDCMRMEDTOKEN_GEN_ALL_CORE_FT
acetaminophen 325 mg oral tablet: 2 tab(s) orally every 6 hours  oxyCODONE 5 mg oral tablet: 1 tab(s) orally every 8 hours as needed for breakthrough pain MDD: 3  polyethylene glycol 3350 oral powder for reconstitution: 17 gram(s) orally once a day (at bedtime)  senna leaf extract oral tablet: 2 tab(s) orally once a day (at bedtime) as needed for  constipation   acetaminophen 325 mg oral tablet: 2 tab(s) orally every 6 hours  aspirin 81 mg oral capsule: 1 cap(s) orally 2 times a day Please take aspirin po bid to prevent clot formation  oxyCODONE 5 mg oral tablet: 1 tab(s) orally every 8 hours as needed for breakthrough pain MDD: 3  polyethylene glycol 3350 oral powder for reconstitution: 17 gram(s) orally once a day (at bedtime)  senna leaf extract oral tablet: 2 tab(s) orally once a day (at bedtime) as needed for  constipation

## 2024-01-14 NOTE — DISCHARGE NOTE PROVIDER - CARE PROVIDERS DIRECT ADDRESSES
[email or web address]

## 2024-01-14 NOTE — DISCHARGE NOTE PROVIDER - ATTENDING DISCHARGE PHYSICAL EXAMINATION:
VITALS:   T(C): 37.3 (01-14-24 @ 09:10), Max: 37.3 (01-14-24 @ 09:10)  HR: 103 (01-14-24 @ 09:10) (74 - 103)  BP: 144/66 (01-14-24 @ 09:10) (110/53 - 150/67)  RR: 18 (01-14-24 @ 09:10) (17 - 20)  SpO2: 93% (01-14-24 @ 05:00) (93% - 98%)    GENERAL: NAD, lying in bed comfortably  HEART: Regular rate and rhythm,  LUNGS: Unlabored respirations.  Clear to auscultation bilaterally,  ABDOMEN: Soft, nontender, nondistended, +BS  EXTREMITIES: 2+ peripheral pulses bilaterally.  NERVOUS SYSTEM:  A&Ox3,

## 2024-01-18 DIAGNOSIS — E78.5 HYPERLIPIDEMIA, UNSPECIFIED: ICD-10-CM
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
## 2024-01-18 DIAGNOSIS — S72.032A DISPLACED MIDCERVICAL FRACTURE OF LEFT FEMUR, INITIAL ENCOUNTER FOR CLOSED FRACTURE: ICD-10-CM

## 2024-01-18 DIAGNOSIS — Z86.73 PERSONAL HISTORY OF TRANSIENT ISCHEMIC ATTACK (TIA), AND CEREBRAL INFARCTION WITHOUT RESIDUAL DEFICITS: ICD-10-CM

## 2024-01-18 DIAGNOSIS — Z53.29 PROCEDURE AND TREATMENT NOT CARRIED OUT BECAUSE OF PATIENT'S DECISION FOR OTHER REASONS: ICD-10-CM

## 2024-01-18 DIAGNOSIS — I10 ESSENTIAL (PRIMARY) HYPERTENSION: ICD-10-CM

## 2024-01-18 DIAGNOSIS — Z91.148 PATIENT'S OTHER NONCOMPLIANCE WITH MEDICATION REGIMEN FOR OTHER REASON: ICD-10-CM
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
## 2024-01-18 DIAGNOSIS — F41.1 GENERALIZED ANXIETY DISORDER: ICD-10-CM

## 2024-01-18 DIAGNOSIS — Z79.82 LONG TERM (CURRENT) USE OF ASPIRIN: ICD-10-CM

## 2024-02-01 PROBLEM — I10 ESSENTIAL (PRIMARY) HYPERTENSION: Chronic | Status: ACTIVE | Noted: 2024-01-12

## 2024-02-01 PROBLEM — E78.5 HYPERLIPIDEMIA, UNSPECIFIED: Chronic | Status: ACTIVE | Noted: 2024-01-12

## 2024-02-08 ENCOUNTER — APPOINTMENT (OUTPATIENT)
Dept: ORTHOPEDIC SURGERY | Facility: CLINIC | Age: 74
End: 2024-02-08
Payer: MEDICARE

## 2024-02-08 VITALS — BODY MASS INDEX: 23.49 KG/M2 | WEIGHT: 155 LBS | HEIGHT: 68 IN

## 2024-02-08 PROCEDURE — 99024 POSTOP FOLLOW-UP VISIT: CPT

## 2024-02-08 NOTE — HISTORY OF PRESENT ILLNESS
[de-identified] : s/p Left CELSO done for a femoral neck fracture doing well postop course uncomplicated, home PT complete, progressing appropriately.  pain controlled (-)n/v/cp/sob  Left hip exam shows well healed incision NTTP No pain with PROM  Imaging:  AP and Lateral views were obtained of the hips, including the contralateral right hip for comparison and assessment of leg length. X-rays are negative for acute bone or soft tissue trauma. Left hip replacement in good alignment without radiographic evidence of complication.  Plan: continue home exercise activities as tolerated f/u at 6 weeks.

## 2024-03-17 ENCOUNTER — NON-APPOINTMENT (OUTPATIENT)
Age: 74
End: 2024-03-17

## 2024-03-21 ENCOUNTER — APPOINTMENT (OUTPATIENT)
Dept: ORTHOPEDIC SURGERY | Facility: CLINIC | Age: 74
End: 2024-03-21
Payer: MEDICARE

## 2024-03-21 DIAGNOSIS — Z96.642 PRESENCE OF LEFT ARTIFICIAL HIP JOINT: ICD-10-CM

## 2024-03-21 PROCEDURE — 99024 POSTOP FOLLOW-UP VISIT: CPT

## 2024-03-21 NOTE — HISTORY OF PRESENT ILLNESS
[de-identified] : 73-year-old lady for follow-up of her left hip replacement which was done for femoral neck fracture.  Doing well now. Incision well-healed. No pain. Gait is normal.  Recommend activities as tolerated.  Follow-up with me as needed.

## 2024-03-30 ENCOUNTER — INPATIENT (INPATIENT)
Facility: HOSPITAL | Age: 74
LOS: 1 days | Discharge: HOME | DRG: 149 | End: 2024-04-01
Attending: STUDENT IN AN ORGANIZED HEALTH CARE EDUCATION/TRAINING PROGRAM | Admitting: STUDENT IN AN ORGANIZED HEALTH CARE EDUCATION/TRAINING PROGRAM
Payer: MEDICARE

## 2024-03-30 VITALS — HEIGHT: 66 IN | WEIGHT: 169.98 LBS

## 2024-03-30 DIAGNOSIS — R42 DIZZINESS AND GIDDINESS: ICD-10-CM

## 2024-03-30 LAB
ALBUMIN SERPL ELPH-MCNC: 4.3 G/DL — SIGNIFICANT CHANGE UP (ref 3.5–5.2)
ALP SERPL-CCNC: 115 U/L — SIGNIFICANT CHANGE UP (ref 30–115)
ALT FLD-CCNC: 17 U/L — SIGNIFICANT CHANGE UP (ref 0–41)
ANION GAP SERPL CALC-SCNC: 11 MMOL/L — SIGNIFICANT CHANGE UP (ref 7–14)
AST SERPL-CCNC: 32 U/L — SIGNIFICANT CHANGE UP (ref 0–41)
BASOPHILS # BLD AUTO: 0.07 K/UL — SIGNIFICANT CHANGE UP (ref 0–0.2)
BASOPHILS NFR BLD AUTO: 0.9 % — SIGNIFICANT CHANGE UP (ref 0–1)
BILIRUB SERPL-MCNC: 0.4 MG/DL — SIGNIFICANT CHANGE UP (ref 0.2–1.2)
BUN SERPL-MCNC: 16 MG/DL — SIGNIFICANT CHANGE UP (ref 10–20)
CALCIUM SERPL-MCNC: 9.7 MG/DL — SIGNIFICANT CHANGE UP (ref 8.4–10.5)
CHLORIDE SERPL-SCNC: 101 MMOL/L — SIGNIFICANT CHANGE UP (ref 98–110)
CO2 SERPL-SCNC: 28 MMOL/L — SIGNIFICANT CHANGE UP (ref 17–32)
CREAT SERPL-MCNC: 0.7 MG/DL — SIGNIFICANT CHANGE UP (ref 0.7–1.5)
EGFR: 90 ML/MIN/1.73M2 — SIGNIFICANT CHANGE UP
EOSINOPHIL # BLD AUTO: 0.39 K/UL — SIGNIFICANT CHANGE UP (ref 0–0.7)
EOSINOPHIL NFR BLD AUTO: 4.9 % — SIGNIFICANT CHANGE UP (ref 0–8)
GLUCOSE SERPL-MCNC: 95 MG/DL — SIGNIFICANT CHANGE UP (ref 70–99)
HCT VFR BLD CALC: 43.4 % — SIGNIFICANT CHANGE UP (ref 37–47)
HGB BLD-MCNC: 14.6 G/DL — SIGNIFICANT CHANGE UP (ref 12–16)
IMM GRANULOCYTES NFR BLD AUTO: 0.3 % — SIGNIFICANT CHANGE UP (ref 0.1–0.3)
LYMPHOCYTES # BLD AUTO: 1.78 K/UL — SIGNIFICANT CHANGE UP (ref 1.2–3.4)
LYMPHOCYTES # BLD AUTO: 22.3 % — SIGNIFICANT CHANGE UP (ref 20.5–51.1)
MAGNESIUM SERPL-MCNC: 2 MG/DL — SIGNIFICANT CHANGE UP (ref 1.8–2.4)
MCHC RBC-ENTMCNC: 28.9 PG — SIGNIFICANT CHANGE UP (ref 27–31)
MCHC RBC-ENTMCNC: 33.6 G/DL — SIGNIFICANT CHANGE UP (ref 32–37)
MCV RBC AUTO: 85.9 FL — SIGNIFICANT CHANGE UP (ref 81–99)
MONOCYTES # BLD AUTO: 0.67 K/UL — HIGH (ref 0.1–0.6)
MONOCYTES NFR BLD AUTO: 8.4 % — SIGNIFICANT CHANGE UP (ref 1.7–9.3)
NEUTROPHILS # BLD AUTO: 5.06 K/UL — SIGNIFICANT CHANGE UP (ref 1.4–6.5)
NEUTROPHILS NFR BLD AUTO: 63.2 % — SIGNIFICANT CHANGE UP (ref 42.2–75.2)
NRBC # BLD: 0 /100 WBCS — SIGNIFICANT CHANGE UP (ref 0–0)
NT-PROBNP SERPL-SCNC: 45 PG/ML — SIGNIFICANT CHANGE UP (ref 0–300)
PLATELET # BLD AUTO: 278 K/UL — SIGNIFICANT CHANGE UP (ref 130–400)
PMV BLD: 10.6 FL — HIGH (ref 7.4–10.4)
POTASSIUM SERPL-MCNC: 4.5 MMOL/L — SIGNIFICANT CHANGE UP (ref 3.5–5)
POTASSIUM SERPL-SCNC: 4.5 MMOL/L — SIGNIFICANT CHANGE UP (ref 3.5–5)
PROT SERPL-MCNC: 7.3 G/DL — SIGNIFICANT CHANGE UP (ref 6–8)
RBC # BLD: 5.05 M/UL — SIGNIFICANT CHANGE UP (ref 4.2–5.4)
RBC # FLD: 14.8 % — HIGH (ref 11.5–14.5)
SODIUM SERPL-SCNC: 140 MMOL/L — SIGNIFICANT CHANGE UP (ref 135–146)
TROPONIN T, HIGH SENSITIVITY RESULT: 11 NG/L — SIGNIFICANT CHANGE UP (ref 6–13)
TROPONIN T, HIGH SENSITIVITY RESULT: 11 NG/L — SIGNIFICANT CHANGE UP (ref 6–13)
WBC # BLD: 7.99 K/UL — SIGNIFICANT CHANGE UP (ref 4.8–10.8)
WBC # FLD AUTO: 7.99 K/UL — SIGNIFICANT CHANGE UP (ref 4.8–10.8)

## 2024-03-30 PROCEDURE — 97112 NEUROMUSCULAR REEDUCATION: CPT | Mod: GP

## 2024-03-30 PROCEDURE — 97165 OT EVAL LOW COMPLEX 30 MIN: CPT | Mod: GO

## 2024-03-30 PROCEDURE — 71045 X-RAY EXAM CHEST 1 VIEW: CPT | Mod: 26

## 2024-03-30 PROCEDURE — 36415 COLL VENOUS BLD VENIPUNCTURE: CPT

## 2024-03-30 PROCEDURE — 70450 CT HEAD/BRAIN W/O DYE: CPT | Mod: 26,MC

## 2024-03-30 PROCEDURE — 85025 COMPLETE CBC W/AUTO DIFF WBC: CPT

## 2024-03-30 PROCEDURE — 97162 PT EVAL MOD COMPLEX 30 MIN: CPT | Mod: GP

## 2024-03-30 PROCEDURE — 93010 ELECTROCARDIOGRAM REPORT: CPT

## 2024-03-30 PROCEDURE — 80053 COMPREHEN METABOLIC PANEL: CPT

## 2024-03-30 PROCEDURE — 99285 EMERGENCY DEPT VISIT HI MDM: CPT | Mod: FS

## 2024-03-30 PROCEDURE — 70544 MR ANGIOGRAPHY HEAD W/O DYE: CPT

## 2024-03-30 PROCEDURE — 99222 1ST HOSP IP/OBS MODERATE 55: CPT | Mod: AI

## 2024-03-30 PROCEDURE — 70551 MRI BRAIN STEM W/O DYE: CPT | Mod: MC

## 2024-03-30 RX ORDER — DIAZEPAM 5 MG
5 TABLET ORAL EVERY 6 HOURS
Refills: 0 | Status: DISCONTINUED | OUTPATIENT
Start: 2024-03-30 | End: 2024-04-01

## 2024-03-30 RX ORDER — MIDAZOLAM HYDROCHLORIDE 1 MG/ML
2 INJECTION, SOLUTION INTRAMUSCULAR; INTRAVENOUS ONCE
Refills: 0 | Status: DISCONTINUED | OUTPATIENT
Start: 2024-03-30 | End: 2024-03-30

## 2024-03-30 RX ORDER — ASPIRIN/CALCIUM CARB/MAGNESIUM 324 MG
81 TABLET ORAL DAILY
Refills: 0 | Status: DISCONTINUED | OUTPATIENT
Start: 2024-03-30 | End: 2024-04-01

## 2024-03-30 RX ORDER — MECLIZINE HCL 12.5 MG
25 TABLET ORAL EVERY 8 HOURS
Refills: 0 | Status: DISCONTINUED | OUTPATIENT
Start: 2024-03-30 | End: 2024-04-01

## 2024-03-30 RX ORDER — ACETAMINOPHEN 500 MG
650 TABLET ORAL EVERY 6 HOURS
Refills: 0 | Status: DISCONTINUED | OUTPATIENT
Start: 2024-03-30 | End: 2024-04-01

## 2024-03-30 RX ORDER — ENOXAPARIN SODIUM 100 MG/ML
40 INJECTION SUBCUTANEOUS EVERY 24 HOURS
Refills: 0 | Status: DISCONTINUED | OUTPATIENT
Start: 2024-03-30 | End: 2024-04-01

## 2024-03-30 RX ORDER — ONDANSETRON 8 MG/1
4 TABLET, FILM COATED ORAL EVERY 8 HOURS
Refills: 0 | Status: DISCONTINUED | OUTPATIENT
Start: 2024-03-30 | End: 2024-04-01

## 2024-03-30 RX ORDER — LANOLIN ALCOHOL/MO/W.PET/CERES
3 CREAM (GRAM) TOPICAL AT BEDTIME
Refills: 0 | Status: DISCONTINUED | OUTPATIENT
Start: 2024-03-30 | End: 2024-04-01

## 2024-03-30 RX ADMIN — Medication 2 MILLIGRAM(S): at 14:22

## 2024-03-30 RX ADMIN — MIDAZOLAM HYDROCHLORIDE 2 MILLIGRAM(S): 1 INJECTION, SOLUTION INTRAMUSCULAR; INTRAVENOUS at 12:20

## 2024-03-30 NOTE — ED PROVIDER NOTE - PHYSICAL EXAMINATION
Physical Exam    Vital Signs: I have reviewed the initial vital signs.  Constitutional: appears stated age, no acute distress  Eyes: Conjunctiva pink, Sclera clear, PERRLA, EOMI.  Cardiovascular: S1 and S2, regular rate, regular rhythm, well-perfused extremities, radial pulses equal and 2+, pedal pulses 2+ and equal  Respiratory: unlabored respiratory effort, clear to auscultation bilaterally no wheezing, rales and rhonchi  Gastrointestinal: soft, non-tender abdomen, no pulsatile mass, normal bowl sounds  Musculoskeletal: supple neck, no lower extremity edema, no midline tenderness  Integumentary: warm, dry, no rash  Neurologic: awake, alert, cranial nerves II-XII grossly intact, extremities’ motor and sensory functions grossly intact, normal speech, normal gait.

## 2024-03-30 NOTE — H&P ADULT - NSHPREVIEWOFSYSTEMS_GEN_ALL_CORE
GENERAL: No fever, weight loss, or fatigue  HEENT:   (+) sinus infection, (+)rhinorrhea, no eye pain, visual change/discharge, no neck pain/stiffness  RESPIRATORY: No cough,  No Shortness of Breath  CARDIOVASCULAR: no chest pain, no palpitation  GASTROINTESTINAL: No abdominal  pain. No nausea, vomiting, diarrhea.  GENITOURINARY: No dysuria, frequency, hematuria, or incontinence  NEUROLOGICAL: (+) headaches, (+)Dizziness, no memory loss, loss of strength, numbness, or tremors  SKIN: No itching, burning, rashes, or lesions

## 2024-03-30 NOTE — ED PROVIDER NOTE - OBJECTIVE STATEMENT
73-year-old female, past medical history hypertension, hyperlipidemia, GERD, TIA, anxiety presents emergency department for dizziness and near syncope over the last several weeks.  Symptoms wax and wane.  No specific pain or radiation. Denies fever, chills, cp, sob, neck pain, visual changes, nvd, numbness, tingling.

## 2024-03-30 NOTE — H&P ADULT - ASSESSMENT
73y old female with pmhx of HTN, HLD, GERD, TIA, anxiety presents to the ED complaining of intermittent dizziness that started few weeks ago waxing  and wanining.  pt states feels like she's going to pass out and worse with movement.  Pt states recent sinus infection, feels congested, eye pressure, headache and  rhinorrhea. s/p  hip surgery 8 weeks ago. pt admits to anxiety, high BP at home and sorethroat that has now resolved   but denies chest/ neck pain, SOB, visual changes, numbness, paraesthesias, n/v/d, fever/chills or LOC.    #Dizziness  admit to med-surg  tele monitor  labs/ecg/c-xray  off head CT  meclizine/valium prn  MRI of brain  Neurology consult  GI/DVT prophylaxix  PT/OT eval  monitor vss  monitor pt 73y old female with pmhx of HTN, HLD, GERD, TIA, anxiety presents to the ED complaining of intermittent dizziness that started few weeks ago waxing  and wanining.  pt states feels like she's going to pass out and worse with movement.  Pt states recent sinus infection, feels congested, eye pressure, headache and  rhinorrhea. s/p  hip surgery 8 weeks ago. pt admits to anxiety, high BP at home and sorethroat that has now resolved   but denies chest/ neck pain, SOB, visual changes, numbness, paraesthesias, n/v/d, fever/chills or LOC.    #Dizziness  admit to med-surg  tele monitor  labs/ecg/c-xray  off head CT  meclizine/valium prn  Orthostatic BP's  MRI of brain  Neurology consult  GI/DVT prophylaxix  PT/OT eval  monitor vss  monitor pt 73y old female with pmhx of HTN, HLD, GERD, TIA, anxiety presents to the ED complaining of intermittent dizziness that started few weeks ago waxing  and wanining.  pt states feels like she's going to pass out and worse with movement.  Pt states recent sinus infection, feels congested, eye pressure, headache and  rhinorrhea. s/p  hip surgery 8 weeks ago. pt admits to anxiety, high BP at home and sorethroat that has now resolved   but denies chest/ neck pain, SOB, visual changes, numbness, paraesthesias, n/v/d, fever/chills or LOC.    #Dizziness  admit to med-surg  tele monitor  labs/ecg/c-xray  off head CT  meclizine/valium prn  Orthostatic BP's  MRI of brain  Neurology consult  GI/DVT prophylaxis  PT/OT eval    #HTN  #HLD  #GERD  #TIA  GI/DVT prophylaxis  monitor vss  monitor pt 73y old female with pmhx of HTN, HLD, GERD, TIA, anxiety presents to the ED complaining of intermittent dizziness that started few weeks ago waxing  and wanining.  pt states feels like she's going to pass out and worse with movement.  Pt states recent sinus infection, feels congested, eye pressure, headache and  rhinorrhea. s/p  hip surgery 8 weeks ago. pt admits to anxiety, high BP at home and sorethroat that has now resolved   but denies chest/ neck pain, SOB, visual changes, numbness, paraesthesias, n/v/d, fever/chills or LOC.    #Dizziness  admit to med-surg  tele monitor  labs/ecg/c-xray  off head CT  meclizine/valium prn  Orthostatic BP's  MRI of brain  Neurology consult  GI/DVT prophylaxis  PT/OT eval  safety/fall precautions    #HTN  #HLD  #GERD  #TIA  GI/DVT prophylaxis  monitor vss  monitor pt

## 2024-03-30 NOTE — H&P ADULT - NSHPLABSRESULTS_GEN_ALL_CORE
14.6   7.99  )-----------( 278      ( 30 Mar 2024 12:28 )             43.4       03-30    140  |  101  |  16  ----------------------------<  95  4.5   |  28  |  0.7    Ca    9.7      30 Mar 2024 12:28  Mg     2.0     03-30    TPro  7.3  /  Alb  4.3  /  TBili  0.4  /  DBili  x   /  AST  32  /  ALT  17  /  AlkPhos  115  03-30          Magnesium: 2.0 mg/dL (03-30-24 @ 12:28)          Urinalysis Basic - ( 30 Mar 2024 12:28 )    Color: x / Appearance: x / SG: x / pH: x  Gluc: 95 mg/dL / Ketone: x  / Bili: x / Urobili: x   Blood: x / Protein: x / Nitrite: x   Leuk Esterase: x / RBC: x / WBC x   Sq Epi: x / Non Sq Epi: x / Bacteria: x            Lactate Trend

## 2024-03-30 NOTE — ED PROVIDER NOTE - ATTENDING APP SHARED VISIT CONTRIBUTION OF CARE
I have personally performed a history and physical exam on this patient and personally directed the management of the patient. Patient is a 73-year-old female presents for evaluation of hypertension of note patient has had dizziness over the past several weeks intermittent with intermittent symptom variation denies any current headache visual changes neck pain chest pain shortness of breath abdominal pain back pain weakness of the extremities she is able to ambulate at baseline no numbness tingling noted    On physical exam patient is normocephalic atraumatic pupils equal round reactive light accommodation extraocular muscles intact oropharynx clear chest clear to auscultation bilaterally abdomen soft nontender nondistended bowel sounds positive no guarding or rebound extremities full range of motion no focal deficits noted patient is able to ambulate patient has normal strength and sensation no dysdiadochokinesia noted on exam    Assessment plan patient presents for evaluation of hypertension however is currently asymptomatic she does note intermittent periods of dizziness over the past several weeks we obtain EKG per my independent evaluation is not consistent with STEMI QT prolongation or arrhythmia we obtained chest x-ray per my independent evaluation not consistent with pneumonia or pneumothorax given the patient's symptoms we obtained CT head given CT findings we discussed case with Dr. Marquise Denson from neurology advised to admit for further evaluation at this time patient is not a candidate for TNK emergent neurointervention based on time course symptom onset over the past several weeks patient is aware agrees with plan of care

## 2024-03-30 NOTE — ED ADULT TRIAGE NOTE - CHIEF COMPLAINT QUOTE
Pt states she took her BP at home and it was "very high". she sates she has been dizzy for 2 weeks but she has a sinus infection.  She also states she had hip surgery 8 weeks ago and also severely anxious.

## 2024-03-30 NOTE — H&P ADULT - HISTORY OF PRESENT ILLNESS
73y old female with pmhx of HTN, HLD, GERD, TIA, anxiety presents to the ED complaining of intermittent dizziness that started few weeks ago waxing  and wanining.  pt states feels like she's going to pass out and worse with movement.  Pt states recent sinus infection, feels congested, eye pressure, headache and  rhinorrhea. s/p  hip surgery 8 weeks ago. pt admits to anxiety, high BP at home and sorethroat that has now resolved   but denies chest/ neck pain, SOB, visual changes, numbness, paraesthesias, n/v/d, fever/chills or LOC.

## 2024-03-30 NOTE — H&P ADULT - NS ATTEND AMEND GEN_ALL_CORE FT
-Presenting with Near syncope and dizziness (sensation of herself spinning) with H/O TIA  R/O Posterior Circulation CVA  Multiple h/o Sinus infection with similar sensation   EKG: no sign of ischemia, HS troponin: 11>>11.   CT Head with Hypotenuation favor Acute infarct v.s Artifact,  Per  ED, neuro requesting MR brain   Keep on Tele, get orthostatic vitals, meclizine and valium PRN   PT and OT eval -Presenting with Near syncope and dizziness (sensation of herself spinning) with H/O TIA  R/O Posterior Circulation CVA  Multiple h/o Sinus infection with similar sensation that improves with bactrim   EKG: no sign of ischemia, HS troponin: 11>>11.   CT Head with Hypotenuation favor Acute infarct v.s Artifact,  Per  ED, neuro requesting MR brain   Keep on Tele, get orthostatic vitals, meclizine and valium PRN   Q8 neuro-checks, PT and OT eval, neuro consult

## 2024-03-30 NOTE — ED PROVIDER NOTE - CLINICAL SUMMARY MEDICAL DECISION MAKING FREE TEXT BOX
On physical exam patient is normocephalic atraumatic pupils equal round reactive light accommodation extraocular muscles intact oropharynx clear chest clear to auscultation bilaterally abdomen soft nontender nondistended bowel sounds positive no guarding or rebound extremities full range of motion no focal deficits noted patient is able to ambulate patient has normal strength and sensation no dysdiadochokinesia noted on exam    Assessment plan patient presents for evaluation of hypertension however is currently asymptomatic she does note intermittent periods of dizziness over the past several weeks we obtain EKG per my independent evaluation is not consistent with STEMI QT prolongation or arrhythmia we obtained chest x-ray per my independent evaluation not consistent with pneumonia or pneumothorax given the patient's symptoms we obtained CT head given CT findings we discussed case with Dr. Marquise Denson from neurology advised to admit for further evaluation at this time patient is not a candidate for TNK emergent neurointervention based on time course symptom onset over the past several weeks patient is aware agrees with plan of care

## 2024-03-30 NOTE — H&P ADULT - NSHPPHYSICALEXAM_GEN_ALL_CORE
Vital Signs Last 24 Hrs  T(C): 36.5 (30 Mar 2024 18:29), Max: 36.5 (30 Mar 2024 18:29)  T(F): 97.7 (30 Mar 2024 18:29), Max: 97.7 (30 Mar 2024 18:29)  HR: 83 (30 Mar 2024 18:29) (80 - 91)  BP: 174/85 (30 Mar 2024 18:29) (150/65 - 234/102)  RR: 18 (30 Mar 2024 18:29) (18 - 20)  SpO2: 94% (30 Mar 2024 18:29) (94% - 100%)    Parameters below as of 30 Mar 2024 18:29  Patient On (Oxygen Delivery Method): room air    PHYSICAL EXAM:  GENERAL: Alert, comfortable, NAD, well-developed  HEAD:  Atraumatic, Normocephalic  EYES: EOMI, PERRLA, conjunctiva and sclera clear  NECK: Supple, No JVD  CHEST/LUNG: Clear to auscultation bilaterally; No wheeze  HEART: Regular rate and rhythm; S1,S2  ABDOMEN: Soft, Nontender, Nondistended; Bowel sounds present  EXTREMITIES:  2+ Peripheral Pulses, No clubbing, cyanosis, or edema  NEUROLOGY: non-focal  SKIN: No rashes or lesions

## 2024-03-31 LAB
ALBUMIN SERPL ELPH-MCNC: 3.6 G/DL — SIGNIFICANT CHANGE UP (ref 3.5–5.2)
ALP SERPL-CCNC: 93 U/L — SIGNIFICANT CHANGE UP (ref 30–115)
ALT FLD-CCNC: 12 U/L — SIGNIFICANT CHANGE UP (ref 0–41)
ANION GAP SERPL CALC-SCNC: 10 MMOL/L — SIGNIFICANT CHANGE UP (ref 7–14)
AST SERPL-CCNC: 15 U/L — SIGNIFICANT CHANGE UP (ref 0–41)
BASOPHILS # BLD AUTO: 0.06 K/UL — SIGNIFICANT CHANGE UP (ref 0–0.2)
BASOPHILS NFR BLD AUTO: 0.9 % — SIGNIFICANT CHANGE UP (ref 0–1)
BILIRUB SERPL-MCNC: 0.3 MG/DL — SIGNIFICANT CHANGE UP (ref 0.2–1.2)
BUN SERPL-MCNC: 19 MG/DL — SIGNIFICANT CHANGE UP (ref 10–20)
CALCIUM SERPL-MCNC: 8.9 MG/DL — SIGNIFICANT CHANGE UP (ref 8.4–10.5)
CHLORIDE SERPL-SCNC: 104 MMOL/L — SIGNIFICANT CHANGE UP (ref 98–110)
CO2 SERPL-SCNC: 27 MMOL/L — SIGNIFICANT CHANGE UP (ref 17–32)
CREAT SERPL-MCNC: 0.6 MG/DL — LOW (ref 0.7–1.5)
EGFR: 94 ML/MIN/1.73M2 — SIGNIFICANT CHANGE UP
EOSINOPHIL # BLD AUTO: 0.34 K/UL — SIGNIFICANT CHANGE UP (ref 0–0.7)
EOSINOPHIL NFR BLD AUTO: 5.1 % — SIGNIFICANT CHANGE UP (ref 0–8)
GLUCOSE SERPL-MCNC: 94 MG/DL — SIGNIFICANT CHANGE UP (ref 70–99)
HCT VFR BLD CALC: 40.7 % — SIGNIFICANT CHANGE UP (ref 37–47)
HGB BLD-MCNC: 13.4 G/DL — SIGNIFICANT CHANGE UP (ref 12–16)
IMM GRANULOCYTES NFR BLD AUTO: 0.2 % — SIGNIFICANT CHANGE UP (ref 0.1–0.3)
LYMPHOCYTES # BLD AUTO: 1.43 K/UL — SIGNIFICANT CHANGE UP (ref 1.2–3.4)
LYMPHOCYTES # BLD AUTO: 21.5 % — SIGNIFICANT CHANGE UP (ref 20.5–51.1)
MCHC RBC-ENTMCNC: 28.5 PG — SIGNIFICANT CHANGE UP (ref 27–31)
MCHC RBC-ENTMCNC: 32.9 G/DL — SIGNIFICANT CHANGE UP (ref 32–37)
MCV RBC AUTO: 86.4 FL — SIGNIFICANT CHANGE UP (ref 81–99)
MONOCYTES # BLD AUTO: 0.55 K/UL — SIGNIFICANT CHANGE UP (ref 0.1–0.6)
MONOCYTES NFR BLD AUTO: 8.3 % — SIGNIFICANT CHANGE UP (ref 1.7–9.3)
NEUTROPHILS # BLD AUTO: 4.27 K/UL — SIGNIFICANT CHANGE UP (ref 1.4–6.5)
NEUTROPHILS NFR BLD AUTO: 64 % — SIGNIFICANT CHANGE UP (ref 42.2–75.2)
NRBC # BLD: 0 /100 WBCS — SIGNIFICANT CHANGE UP (ref 0–0)
PLATELET # BLD AUTO: 264 K/UL — SIGNIFICANT CHANGE UP (ref 130–400)
PMV BLD: 10.7 FL — HIGH (ref 7.4–10.4)
POTASSIUM SERPL-MCNC: 4.5 MMOL/L — SIGNIFICANT CHANGE UP (ref 3.5–5)
POTASSIUM SERPL-SCNC: 4.5 MMOL/L — SIGNIFICANT CHANGE UP (ref 3.5–5)
PROT SERPL-MCNC: 5.8 G/DL — LOW (ref 6–8)
RBC # BLD: 4.71 M/UL — SIGNIFICANT CHANGE UP (ref 4.2–5.4)
RBC # FLD: 14.9 % — HIGH (ref 11.5–14.5)
SODIUM SERPL-SCNC: 141 MMOL/L — SIGNIFICANT CHANGE UP (ref 135–146)
WBC # BLD: 6.66 K/UL — SIGNIFICANT CHANGE UP (ref 4.8–10.8)
WBC # FLD AUTO: 6.66 K/UL — SIGNIFICANT CHANGE UP (ref 4.8–10.8)

## 2024-03-31 PROCEDURE — 70551 MRI BRAIN STEM W/O DYE: CPT | Mod: 26

## 2024-03-31 PROCEDURE — 70544 MR ANGIOGRAPHY HEAD W/O DYE: CPT | Mod: 26,XU

## 2024-03-31 PROCEDURE — 99222 1ST HOSP IP/OBS MODERATE 55: CPT

## 2024-03-31 PROCEDURE — 99232 SBSQ HOSP IP/OBS MODERATE 35: CPT

## 2024-03-31 RX ORDER — PANTOPRAZOLE SODIUM 20 MG/1
40 TABLET, DELAYED RELEASE ORAL
Refills: 0 | Status: DISCONTINUED | OUTPATIENT
Start: 2024-04-01 | End: 2024-04-01

## 2024-03-31 RX ADMIN — Medication 81 MILLIGRAM(S): at 14:15

## 2024-03-31 RX ADMIN — Medication 2 MILLIGRAM(S): at 11:19

## 2024-03-31 NOTE — PROGRESS NOTE ADULT - ASSESSMENT
73y old female with pmhx of HTN, HLD, GERD, TIA, anxiety presents to the ED complaining of intermittent dizziness that started few weeks ago waxing  and wanining.  pt states feels like she's going to pass out and worse with movement.  Pt states recent sinus infection, feels congested, eye pressure, headache and  rhinorrhea. s/p  hip surgery 8 weeks ago. pt admits to anxiety, high BP at home and sorethroat that has now resolved   but denies chest/ neck pain, SOB, visual changes, numbness, paraesthesias, n/v/d, fever/chills or LOC.      Presenting with Near syncope and dizziness (sensation of herself spinning) with H/O TIA>>R/O Posterior Circulation CVA  Multiple H/O  Sinus infection with similar sensation that improves with bactrim   EKG: no sign of ischemia, HS troponin: 11>>11  CT Head with Hypoattenuation favor Acute infarct v.s Artifact>>F/up Brain MRI   Keep on Telemetry, get orthostatic vitals, meclizine and valium PRN   Q8 neuro-checks, PT and OT eval, neuro consult.    Hypertension + HLD: not on medications, monitor     DVT PPX: Lovenox   GERD + GI Prophylaxis: PPI   Full Code  Dispo: pending MRI and Neuro consult + improvement in symptoms   Anticipate D/C in 48hrs

## 2024-03-31 NOTE — PHYSICAL THERAPY INITIAL EVALUATION ADULT - ADDITIONAL COMMENTS
pt lives alone in a private house with no steps to enter and one level inside.  Pt had  L THR 1/13/2024 and was using RW for two weeks, but then progressed to amb without AD.

## 2024-03-31 NOTE — CONSULT NOTE ADULT - SUBJECTIVE AND OBJECTIVE BOX
CRISTINA STRANGE     73y     Female    MRN-[id number]                                                           CC:Patient is a 73y old  Female who presents with a chief complaint of Dizziness (31 Mar 2024 08:22)      HPI:  73y old female with pmhx of HTN, HLD, GERD, TIA, anxiety presents to the ED complaining of intermittent dizziness that started few weeks ago waxing  and wanining.  pt states feels like she's going to pass out and worse with movement.  Pt states recent sinus infection, feels congested, eye pressure, headache and  rhinorrhea. s/p  hip surgery 8 weeks ago. pt admits to anxiety, high BP at home and sorethroat that has now resolved   but denies chest/ neck pain, SOB, visual changes, numbness, paraesthesias, n/v/d, fever/chills or LOC. (30 Mar 2024 18:07)      ROS:  Constitutional, Neurological, Psychiatric, Eyes, ENT, Cardiovascular, Respiratory, Gastrointestinal, Genitourinary, Musculoskeletal, Integumentary, Endocrine and Heme/Lymph are otherwise negative. Except for noted above    Social History: No smoking, No drinking, No drug use    FAMILY HISTORY:  No pertinent family history in first degree relatives              Vital Signs Last 24 Hrs  T(C): 36.4 (31 Mar 2024 05:00), Max: 36.8 (30 Mar 2024 21:01)  T(F): 97.6 (31 Mar 2024 05:00), Max: 98.3 (30 Mar 2024 21:01)  HR: 75 (31 Mar 2024 05:00) (75 - 91)  BP: 158/82 (31 Mar 2024 06:34) (150/65 - 234/102)  BP(mean): --  RR: 18 (31 Mar 2024 05:00) (18 - 20)  SpO2: 94% (31 Mar 2024 05:00) (94% - 100%)    Parameters below as of 30 Mar 2024 21:01  Patient On (Oxygen Delivery Method): room air        Physical Exam:  Constitutional: alert and in no acute distress.  Eyes: the sclera and conjunctiva were normal, pupils were equal in size, round, reactive to light, with normal accommodation and extraocular movements were intact.   Back: no costovertebral angle tenderness and no spinal tenderness.      Neuro Exam:  Orientation: oriented to person, oriented to place and oriented to time.   Attention: normal concentrating ability and visual attention was not decreased.   Language: no difficulty naming common objects, no difficulty repeating a phrase, no difficulty writing a sentence, fluency intact, comprehension intact and reading intact.   Fund of knowledge: displays adequate knowledge of personal past history.   Cranial Nerves: visual acuity intact bilaterally, visual fields full to confrontation, pupils equal round and reactive to light, extraocular motion intact, facial sensation intact symmetrically, face symmetrical, hearing was intact bilaterally, tongue and palate midline, head turning and shoulder shrug symmetric and there was no tongue deviation with protrusion.   Motor: muscle tone was normal in all four extremities, muscle strength was normal in all four extremities and normal bulk in all four extremities.   Sensory exam: light touch was intact.   Coordination:. normal gait. balance was intact. there was no past-pointing. no tremor present.   Deep tendon reflexes:   Biceps right 2+. Biceps left 2+.    Triceps right 2+. Triceps left 2+.    Brachioradialis right 2+. Brachioradialis left 2+.    Patella right 2+. Patella left 2+.    Ankle jerk right 2+. Ankle jerk left 2+.   Plantar responses normal on the right, normal on the left.      NIHSS:     Allergies    morphine (Vomiting)  succinylcholine (Other)    Intolerances       Home Medications:      MEDICATIONS  (STANDING):  aspirin  chewable 81 milliGRAM(s) Oral daily  enoxaparin Injectable 40 milliGRAM(s) SubCutaneous every 24 hours    MEDICATIONS  (PRN):  acetaminophen     Tablet .. 650 milliGRAM(s) Oral every 6 hours PRN Temp greater or equal to 38C (100.4F), Mild Pain (1 - 3)  aluminum hydroxide/magnesium hydroxide/simethicone Suspension 30 milliLiter(s) Oral every 4 hours PRN Dyspepsia  diazepam    Tablet 5 milliGRAM(s) Oral every 6 hours PRN dizziness  meclizine 25 milliGRAM(s) Oral every 8 hours PRN Dizziness  melatonin 3 milliGRAM(s) Oral at bedtime PRN Insomnia  ondansetron Injectable 4 milliGRAM(s) IV Push every 8 hours PRN Nausea and/or Vomiting      LABS:                        13.4   6.66  )-----------( 264      ( 31 Mar 2024 07:09 )             40.7     03-31    141  |  104  |  19  ----------------------------<  94  4.5   |  27  |  0.6<L>    Ca    8.9      31 Mar 2024 07:09  Mg     2.0     03-30    TPro  5.8<L>  /  Alb  3.6  /  TBili  0.3  /  DBili  x   /  AST  15  /  ALT  12  /  AlkPhos  93  03-31            Neuro Imaging:  NCT:   < from: CT Head No Cont (03.30.24 @ 14:38) >  PROCEDURE DATE:  03/30/2024          INTERPRETATION:  Clinical History / Reason for exam: Dizziness    Multiple axial sections were performed from the base of skull to vertex   without contrast enhancement. Coronal and significant structures were   performed.    This exam is compared prior head CT performed on May 26, 2022 and brain   MRI performed on June 1, 2022.    Increased parenchymal volume loss and chronic microvessel ischemic   changes are identified    Nonspecific low-attenuation seen involving the left brachium pontis   (2-17) this could be compatible artifact though acute infarct cannot be   entirely excluded. MRI can be done to better characterize finding if   clinically indicated and if there are no contraindications indications.    Low-attenuation is again seen involving the left frontal cortical   subcortical region. This is compatible with an old infarct.    There is no acute hemorrhage seen.    Evaluation of the osseous structures with the appropriate window appears   normal    The visualized paranasal sinuses mastoid and middle ear regions appear   clear.    IMPRESSION: Nonspecific low-attenuation involving the left brachium   pontis as described above.    --- End of Report ---    < end of copied text >    < from: CT Angio Head w/ IV Cont (07.19.21 @ 20:26) >  PROCEDURE DATE:  07/19/2021            INTERPRETATION:  Clinical History / Reason for exam: Weak  right hand.    TECHNIQUE - NONCONTRAST HEAD CT. Contiguous unenhanced CT axial images of the head from the base to the vertex with coronal and sagittal reformats.    TECHNIQUE - CT ANGIOGRAM OF THE HEAD AND NECK: Axial contiguous images were obtained of the head and neck following the intravenous administration of contrast. Reformatted images in the coronal and sagittal planes were acquired, as well as 3DMIP reconstructed images.    Comparison:    FINDINGS - HEAD CT:    There is mild prominence of the ventricles, cortical sulci, and basal cisterns consistent with patient's age.    Grey-white matter differentiation is preserved.    The fourth ventricle is midline.    There is no acute territorial infarct, intracranial hemorrhage, extra-axial fluid collection or midline shift.    Calcifications are noted in the region of the carotid siphons.    Calvarium is intact. No evidence of depressed skull fracture.    The visualized paranasal sinuses and mastoids are well-aerated.        FINDINGS - NECK CTA:    The visualized aortic arch and great vessel origins are patent. There is no stenosis at the origin of the right brachiocephalic, right and left common carotid, left subclavian, and right and left vertebral arteries.    The right and left common, internal and external carotid arteriesare patent. There is no significant calcification and no stenosis at the common carotid artery bifurcations.  There is no evidence of significant stenosis or occlusion of the common carotid arteries, the carotid artery bifurcations, or along the course of the distal cervical portions of the right and left internal carotid arteries.    Normal branching pattern is noted of the bilateral external carotid arteries.    The vertebral arteries are patent.    FINDINGS - HEAD CTA:    The distal right and left internal carotid arteries are patent with mild calcification but no flow-limiting stenosis in the region of the cavernous and supraclinoid portions of the ICAs.    The ophthalmic arteries are patent.    There is normal contrast filling of the middle cerebral arteries and the anterior cerebral arteries bilaterally. No evidence of stenosis, occlusion or aneurysm.    The distal vertebral arteries are patent. The basilar artery is patent. There is normal filling of the PCAs, SCAs, PICAs and AICAsbilaterally. There is persistent fetal circulation of the left posterior cerebral artery from the left posterior communicating artery (anatomic variation). No evidence of stenosis, occlusion or aneurysm.      No venous abnormalities are noted. No evidence of filling defects within the venous sinuses.    OTHER: Bilateral apical pleural thickening is noted. There is a 6.7 mm hypodense nodule in the region of the right lobe of the thyroid gland that may be further evaluated by means of nonemergent thyroid sonography.    IMPRESSION:    No evidence of acute intracranial hemorrhage, acute territorial infarct, mass or midline shift.    Negative CTA of the head and neck    No evidence of major vascular stenosis, occlusion, or aneurysm.    --- End of Report ---    < end of copied text >    < from: MR Head w/wo IV Cont (06.01.22 @ 14:48) >    PROCEDURE DATE:  06/01/2022          INTERPRETATION:  CLINICAL INDICATION: Right face and arm numbness.    Technique: Contrast-enhanced brain MRI, non-contrast brain MRA and   contrast neck MRA was performed.    Through the brain, sagittal and axial T1, axial T2, FLAIR, diffusion   weighted images and an ADC map were obtained. Postcontrast axial and   sagittal T1-weighted images were obtained of the brain.  9 cc of   intravenous gadolinium was administered and 1 cc was discarded,  for   contrast enhanced brain MRI and MR angiography of the extracranial   circulation.    MR angiography of intracranial and extracranial circulation was performed   with time of flight imaging technique. Maximal intensity projection   images were reviewed in multiple planes.    COMPARISON: CT head dated 5/26/2022. CTA of the headand neck dated   7/19/2021    FINDINGS:    Brain MRI:  There is a focal region of diffusion-weighted signal abnormality   involving the left frontal lobe with associated patchy enhancement.    There is no evidence of intracranial hemorrhage, mass effect or midline   shift.    There is patchy periventricular as well as scattered subcortical white   matter T2 and FLAIR signal hyperintensities.    There is a chronic left frontal lobe infarct.    Ventricles and sulci are age-appropriate in size.    There is no extra-axial fluid collection.    Major intracranial flow-voids are preserved.    The orbits, sellar and suprasellar structures, and craniocervical   junction are unremarkable.    The calvarium demonstrates normal signal intensity.    The visualized paranasal sinuses and tympanomastoid cavities are clear.      Brain MRA:  Internal carotid arteries demonstrate unremarkable antegrade flow related   enhancement to the [place] of [place] bilaterally.    There is moderate stenosis noted of the bilateral M2 segments of the left   MCA. The distal MCA branch vessels are patent.    The anterior cerebral arteries are unremarkable. There is no evidence of   aneurysm, vascular malformation or occlusion.    The vertebral arteries are unremarkable to the vertebrobasilar   confluence. There is moderate stenosis of the bilateral proximal P2   segments of the PCAs. Distal vessels appear patent.    Neck MRA:  The aortic arch and origins of the great vessels are unremarkable.    Common and Internal Carotids: The origin, course and caliber of the   common and internal carotid arteries are unremarkable.  There is no   significant stenosis by NASCET criteria.    Vertebral arteries:  The origin, course and caliber of the vertebral arteries are   unremarkable. Both vessels are patent to the intracranial circulation and   vertebrobasilar confluence.      IMPRESSION:  BRAIN  Findings consistent with subacute left frontal lobe infarct without   evidence of hemorrhagic transformation.    Extensive white matter signal abnormalities statistically representing   moderate chronic microvascular type changes though other etiologies   including chronic infection/inflammation, demyelination or sequela of   migraine headaches could be considered.      BRAIN MRA  Moderate stenoses of the left proximal M2 divisions of the MCA as well as   the bilateral P2 segments of the PCAs. No evidence of vessel occlusion.    NECK MRA  No evidence of carotid or vertebral artery stenosis.    --- End of Report ---    < end of copied text >          Assessment / Plan: This is a 73y year old Female presenting with   1.                  CRISTINA STRANGE     73y     Female    MRN-[id number]                                                           CC:Patient is a 73y old  Female who presents with a chief complaint of Dizziness (31 Mar 2024 08:22)      HPI:  73y old female with pmhx of HTN, HLD, GERD, TIA, anxiety presents to the ED complaining of intermittent dizziness that started few weeks ago waxing  and wanining.  pt states feels like she's going to pass out and worse with movement.  Pt states recent sinus infection, feels congested, eye pressure, headache and  rhinorrhea. s/p  hip surgery 8 weeks ago. pt admits to anxiety, high BP at home and sorethroat that has now resolved   but denies chest/ neck pain, SOB, visual changes, numbness, paraesthesias, n/v/d, fever/chills or LOC. (30 Mar 2024 18:07)    Patient feeling less anxious today.  Feels her dizziness was related to her inner ear    ROS:  Constitutional, Neurological, Psychiatric, Eyes, ENT, Cardiovascular, Respiratory, Gastrointestinal, Genitourinary, Musculoskeletal, Integumentary, Endocrine and Heme/Lymph are otherwise negative. Except for noted above    Social History: No smoking, No drinking, No drug use    FAMILY HISTORY:  No pertinent family history in first degree relatives              Vital Signs Last 24 Hrs  T(C): 36.4 (31 Mar 2024 05:00), Max: 36.8 (30 Mar 2024 21:01)  T(F): 97.6 (31 Mar 2024 05:00), Max: 98.3 (30 Mar 2024 21:01)  HR: 75 (31 Mar 2024 05:00) (75 - 91)  BP: 158/82 (31 Mar 2024 06:34) (150/65 - 234/102)  BP(mean): --  RR: 18 (31 Mar 2024 05:00) (18 - 20)  SpO2: 94% (31 Mar 2024 05:00) (94% - 100%)    Parameters below as of 30 Mar 2024 21:01  Patient On (Oxygen Delivery Method): room air        Physical Exam:  Constitutional: alert and in no acute distress.  Eyes: the sclera and conjunctiva were normal, pupils were equal in size, round, reactive to light, with normal accommodation and extraocular movements were intact.   Back: no costovertebral angle tenderness and no spinal tenderness.      Neuro Exam:  Orientation: oriented to person, oriented to place and oriented to time.   Attention: normal concentrating ability and visual attention was not decreased.   Language: no difficulty naming common objects, no difficulty repeating a phrase, no difficulty writing a sentence, fluency intact, comprehension intact and reading intact.   Fund of knowledge: displays adequate knowledge of personal past history.   Cranial Nerves: visual acuity intact bilaterally, visual fields full to confrontation, pupils equal round and reactive to light, extraocular motion intact, facial sensation intact symmetrically, face symmetrical, hearing was intact bilaterally, tongue and palate midline, head turning and shoulder shrug symmetric and there was no tongue deviation with protrusion.   Motor: muscle tone was normal in all four extremities, muscle strength was normal in all four extremities and normal bulk in all four extremities. SLOW JOYCE on right  Sensory exam: light touch was intact.   Coordination:. normal gait. balance was intact. there was no past-pointing. no tremor present.   Deep tendon reflexes:   3+ on right side   Plantar responses normal on the right, normal on the left.      NIHSS: 1    Allergies    morphine (Vomiting)  succinylcholine (Other)    Intolerances       Home Medications:      MEDICATIONS  (STANDING):  aspirin  chewable 81 milliGRAM(s) Oral daily  enoxaparin Injectable 40 milliGRAM(s) SubCutaneous every 24 hours    MEDICATIONS  (PRN):  acetaminophen     Tablet .. 650 milliGRAM(s) Oral every 6 hours PRN Temp greater or equal to 38C (100.4F), Mild Pain (1 - 3)  aluminum hydroxide/magnesium hydroxide/simethicone Suspension 30 milliLiter(s) Oral every 4 hours PRN Dyspepsia  diazepam    Tablet 5 milliGRAM(s) Oral every 6 hours PRN dizziness  meclizine 25 milliGRAM(s) Oral every 8 hours PRN Dizziness  melatonin 3 milliGRAM(s) Oral at bedtime PRN Insomnia  ondansetron Injectable 4 milliGRAM(s) IV Push every 8 hours PRN Nausea and/or Vomiting      LABS:                        13.4   6.66  )-----------( 264      ( 31 Mar 2024 07:09 )             40.7     03-31    141  |  104  |  19  ----------------------------<  94  4.5   |  27  |  0.6<L>    Ca    8.9      31 Mar 2024 07:09  Mg     2.0     03-30    TPro  5.8<L>  /  Alb  3.6  /  TBili  0.3  /  DBili  x   /  AST  15  /  ALT  12  /  AlkPhos  93  03-31            Neuro Imaging:  NCHCT:   < from: CT Head No Cont (03.30.24 @ 14:38) >  PROCEDURE DATE:  03/30/2024          INTERPRETATION:  Clinical History / Reason for exam: Dizziness    Multiple axial sections were performed from the base of skull to vertex   without contrast enhancement. Coronal and significant structures were   performed.    This exam is compared prior head CT performed on May 26, 2022 and brain   MRI performed on June 1, 2022.    Increased parenchymal volume loss and chronic microvessel ischemic   changes are identified    Nonspecific low-attenuation seen involving the left brachium pontis   (2-17) this could be compatible artifact though acute infarct cannot be   entirely excluded. MRI can be done to better characterize finding if   clinically indicated and if there are no contraindications indications.    Low-attenuation is again seen involving the left frontal cortical   subcortical region. This is compatible with an old infarct.    There is no acute hemorrhage seen.    Evaluation of the osseous structures with the appropriate window appears   normal    The visualized paranasal sinuses mastoid and middle ear regions appear   clear.    IMPRESSION: Nonspecific low-attenuation involving the left brachium   pontis as described above.    --- End of Report ---    < end of copied text >    < from: CT Angio Head w/ IV Cont (07.19.21 @ 20:26) >  PROCEDURE DATE:  07/19/2021            INTERPRETATION:  Clinical History / Reason for exam: Weak  right hand.    TECHNIQUE - NONCONTRAST HEAD CT. Contiguous unenhanced CT axial images of the head from the base to the vertex with coronal and sagittal reformats.    TECHNIQUE - CT ANGIOGRAM OF THE HEAD AND NECK: Axial contiguous images were obtained of the head and neck following the intravenous administration of contrast. Reformatted images in the coronal and sagittal planes were acquired, as well as 3DMIP reconstructed images.    Comparison:    FINDINGS - HEAD CT:    There is mild prominence of the ventricles, cortical sulci, and basal cisterns consistent with patient's age.    Grey-white matter differentiation is preserved.    The fourth ventricle is midline.    There is no acute territorial infarct, intracranial hemorrhage, extra-axial fluid collection or midline shift.    Calcifications are noted in the region of the carotid siphons.    Calvarium is intact. No evidence of depressed skull fracture.    The visualized paranasal sinuses and mastoids are well-aerated.        FINDINGS - NECK CTA:    The visualized aortic arch and great vessel origins are patent. There is no stenosis at the origin of the right brachiocephalic, right and left common carotid, left subclavian, and right and left vertebral arteries.    The right and left common, internal and external carotid arteriesare patent. There is no significant calcification and no stenosis at the common carotid artery bifurcations.  There is no evidence of significant stenosis or occlusion of the common carotid arteries, the carotid artery bifurcations, or along the course of the distal cervical portions of the right and left internal carotid arteries.    Normal branching pattern is noted of the bilateral external carotid arteries.    The vertebral arteries are patent.    FINDINGS - HEAD CTA:    The distal right and left internal carotid arteries are patent with mild calcification but no flow-limiting stenosis in the region of the cavernous and supraclinoid portions of the ICAs.    The ophthalmic arteries are patent.    There is normal contrast filling of the middle cerebral arteries and the anterior cerebral arteries bilaterally. No evidence of stenosis, occlusion or aneurysm.    The distal vertebral arteries are patent. The basilar artery is patent. There is normal filling of the PCAs, SCAs, PICAs and AICAsbilaterally. There is persistent fetal circulation of the left posterior cerebral artery from the left posterior communicating artery (anatomic variation). No evidence of stenosis, occlusion or aneurysm.      No venous abnormalities are noted. No evidence of filling defects within the venous sinuses.    OTHER: Bilateral apical pleural thickening is noted. There is a 6.7 mm hypodense nodule in the region of the right lobe of the thyroid gland that may be further evaluated by means of nonemergent thyroid sonography.    IMPRESSION:    No evidence of acute intracranial hemorrhage, acute territorial infarct, mass or midline shift.    Negative CTA of the head and neck    No evidence of major vascular stenosis, occlusion, or aneurysm.    --- End of Report ---    < end of copied text >    < from: MR Head w/wo IV Cont (06.01.22 @ 14:48) >    PROCEDURE DATE:  06/01/2022          INTERPRETATION:  CLINICAL INDICATION: Right face and arm numbness.    Technique: Contrast-enhanced brain MRI, non-contrast brain MRA and   contrast neck MRA was performed.    Through the brain, sagittal and axial T1, axial T2, FLAIR, diffusion   weighted images and an ADC map were obtained. Postcontrast axial and   sagittal T1-weighted images were obtained of the brain.  9 cc of   intravenous gadolinium was administered and 1 cc was discarded,  for   contrast enhanced brain MRI and MR angiography of the extracranial   circulation.    MR angiography of intracranial and extracranial circulation was performed   with time of flight imaging technique. Maximal intensity projection   images were reviewed in multiple planes.    COMPARISON: CT head dated 5/26/2022. CTA of the headand neck dated   7/19/2021    FINDINGS:    Brain MRI:  There is a focal region of diffusion-weighted signal abnormality   involving the left frontal lobe with associated patchy enhancement.    There is no evidence of intracranial hemorrhage, mass effect or midline   shift.    There is patchy periventricular as well as scattered subcortical white   matter T2 and FLAIR signal hyperintensities.    There is a chronic left frontal lobe infarct.    Ventricles and sulci are age-appropriate in size.    There is no extra-axial fluid collection.    Major intracranial flow-voids are preserved.    The orbits, sellar and suprasellar structures, and craniocervical   junction are unremarkable.    The calvarium demonstrates normal signal intensity.    The visualized paranasal sinuses and tympanomastoid cavities are clear.      Brain MRA:  Internal carotid arteries demonstrate unremarkable antegrade flow related   enhancement to the [place] of [place] bilaterally.    There is moderate stenosis noted of the bilateral M2 segments of the left   MCA. The distal MCA branch vessels are patent.    The anterior cerebral arteries are unremarkable. There is no evidence of   aneurysm, vascular malformation or occlusion.    The vertebral arteries are unremarkable to the vertebrobasilar   confluence. There is moderate stenosis of the bilateral proximal P2   segments of the PCAs. Distal vessels appear patent.    Neck MRA:  The aortic arch and origins of the great vessels are unremarkable.    Common and Internal Carotids: The origin, course and caliber of the   common and internal carotid arteries are unremarkable.  There is no   significant stenosis by NASCET criteria.    Vertebral arteries:  The origin, course and caliber of the vertebral arteries are   unremarkable. Both vessels are patent to the intracranial circulation and   vertebrobasilar confluence.      IMPRESSION:  BRAIN  Findings consistent with subacute left frontal lobe infarct without   evidence of hemorrhagic transformation.    Extensive white matter signal abnormalities statistically representing   moderate chronic microvascular type changes though other etiologies   including chronic infection/inflammation, demyelination or sequela of   migraine headaches could be considered.      BRAIN MRA  Moderate stenoses of the left proximal M2 divisions of the MCA as well as   the bilateral P2 segments of the PCAs. No evidence of vessel occlusion.    NECK MRA  No evidence of carotid or vertebral artery stenosis.    --- End of Report ---    < end of copied text >          Assessment / Plan: This is a 73y year old Female presenting with   1.                  CRISTINA STRANGE     73y     Female    MRN-[id number]                                                           CC:Patient is a 73y old  Female who presents with a chief complaint of Dizziness (31 Mar 2024 08:22)      HPI:  73y old female with pmhx of HTN, HLD, GERD, TIA, anxiety presents to the ED complaining of intermittent dizziness that started few weeks ago waxing  and wanining.  pt states feels like she's going to pass out and worse with movement.  Pt states recent sinus infection, feels congested, eye pressure, headache and  rhinorrhea. s/p  hip surgery 8 weeks ago. pt admits to anxiety, high BP at home and sorethroat that has now resolved   but denies chest/ neck pain, SOB, visual changes, numbness, paraesthesias, n/v/d, fever/chills or LOC. (30 Mar 2024 18:07)    Patient feeling less anxious today.  Feels her dizziness was related to her inner ear    ROS:  Constitutional, Neurological, Psychiatric, Eyes, ENT, Cardiovascular, Respiratory, Gastrointestinal, Genitourinary, Musculoskeletal, Integumentary, Endocrine and Heme/Lymph are otherwise negative. Except for noted above    Social History: No smoking, No drinking, No drug use    FAMILY HISTORY:  No pertinent family history in first degree relatives              Vital Signs Last 24 Hrs  T(C): 36.4 (31 Mar 2024 05:00), Max: 36.8 (30 Mar 2024 21:01)  T(F): 97.6 (31 Mar 2024 05:00), Max: 98.3 (30 Mar 2024 21:01)  HR: 75 (31 Mar 2024 05:00) (75 - 91)  BP: 158/82 (31 Mar 2024 06:34) (150/65 - 234/102)  BP(mean): --  RR: 18 (31 Mar 2024 05:00) (18 - 20)  SpO2: 94% (31 Mar 2024 05:00) (94% - 100%)    Parameters below as of 30 Mar 2024 21:01  Patient On (Oxygen Delivery Method): room air        Physical Exam:  Constitutional: alert and in no acute distress.  Eyes: the sclera and conjunctiva were normal, pupils were equal in size, round, reactive to light, with normal accommodation and extraocular movements were intact.   Back: no costovertebral angle tenderness and no spinal tenderness.      Neuro Exam:  Orientation: oriented to person, oriented to place and oriented to time.   Attention: normal concentrating ability and visual attention was not decreased.   Language: no difficulty naming common objects, no difficulty repeating a phrase, no difficulty writing a sentence, fluency intact, comprehension intact and reading intact.   Fund of knowledge: displays adequate knowledge of personal past history.   Cranial Nerves: visual acuity intact bilaterally, visual fields full to confrontation, pupils equal round and reactive to light, extraocular motion intact, facial sensation intact symmetrically, face symmetrical, hearing was intact bilaterally, tongue and palate midline, head turning and shoulder shrug symmetric and there was no tongue deviation with protrusion.   Motor: muscle tone was normal in all four extremities, muscle strength was normal in all four extremities and normal bulk in all four extremities. SLOW JOYCE on right  Sensory exam: light touch was intact.   Coordination:. slow gait. balance was intact. there was no past-pointing. no tremor present.   Deep tendon reflexes:   3+ on right side   Plantar responses normal on the right, normal on the left.      NIHSS: 1  mrankin 0      Allergies    morphine (Vomiting)  succinylcholine (Other)    Intolerances       Home Medications:      MEDICATIONS  (STANDING):  aspirin  chewable 81 milliGRAM(s) Oral daily  enoxaparin Injectable 40 milliGRAM(s) SubCutaneous every 24 hours    MEDICATIONS  (PRN):  acetaminophen     Tablet .. 650 milliGRAM(s) Oral every 6 hours PRN Temp greater or equal to 38C (100.4F), Mild Pain (1 - 3)  aluminum hydroxide/magnesium hydroxide/simethicone Suspension 30 milliLiter(s) Oral every 4 hours PRN Dyspepsia  diazepam    Tablet 5 milliGRAM(s) Oral every 6 hours PRN dizziness  meclizine 25 milliGRAM(s) Oral every 8 hours PRN Dizziness  melatonin 3 milliGRAM(s) Oral at bedtime PRN Insomnia  ondansetron Injectable 4 milliGRAM(s) IV Push every 8 hours PRN Nausea and/or Vomiting      LABS:                        13.4   6.66  )-----------( 264      ( 31 Mar 2024 07:09 )             40.7     03-31    141  |  104  |  19  ----------------------------<  94  4.5   |  27  |  0.6<L>    Ca    8.9      31 Mar 2024 07:09  Mg     2.0     03-30    TPro  5.8<L>  /  Alb  3.6  /  TBili  0.3  /  DBili  x   /  AST  15  /  ALT  12  /  AlkPhos  93  03-31            Neuro Imaging:  N[facility]CT:   < from: CT Head No Cont (03.30.24 @ 14:38) >  PROCEDURE DATE:  03/30/2024          INTERPRETATION:  Clinical History / Reason for exam: Dizziness    Multiple axial sections were performed from the base of skull to vertex   without contrast enhancement. Coronal and significant structures were   performed.    This exam is compared prior head CT performed on May 26, 2022 and brain   MRI performed on June 1, 2022.    Increased parenchymal volume loss and chronic microvessel ischemic   changes are identified    Nonspecific low-attenuation seen involving the left brachium pontis   (2-17) this could be compatible artifact though acute infarct cannot be   entirely excluded. MRI can be done to better characterize finding if   clinically indicated and if there are no contraindications indications.    Low-attenuation is again seen involving the left frontal cortical   subcortical region. This is compatible with an old infarct.    There is no acute hemorrhage seen.    Evaluation of the osseous structures with the appropriate window appears   normal    The visualized paranasal sinuses mastoid and middle ear regions appear   clear.    IMPRESSION: Nonspecific low-attenuation involving the left brachium   pontis as described above.    --- End of Report ---    < end of copied text >    < from: CT Angio Head w/ IV Cont (07.19.21 @ 20:26) >  PROCEDURE DATE:  07/19/2021            INTERPRETATION:  Clinical History / Reason for exam: Weak  right hand.    TECHNIQUE - NONCONTRAST HEAD CT. Contiguous unenhanced CT axial images of the head from the base to the vertex with coronal and sagittal reformats.    TECHNIQUE - CT ANGIOGRAM OF THE HEAD AND NECK: Axial contiguous images were obtained of the head and neck following the intravenous administration of contrast. Reformatted images in the coronal and sagittal planes were acquired, as well as 3DMIP reconstructed images.    Comparison:    FINDINGS - HEAD CT:    There is mild prominence of the ventricles, cortical sulci, and basal cisterns consistent with patient's age.    Grey-white matter differentiation is preserved.    The fourth ventricle is midline.    There is no acute territorial infarct, intracranial hemorrhage, extra-axial fluid collection or midline shift.    Calcifications are noted in the region of the carotid siphons.    Calvarium is intact. No evidence of depressed skull fracture.    The visualized paranasal sinuses and mastoids are well-aerated.        FINDINGS - NECK CTA:    The visualized aortic arch and great vessel origins are patent. There is no stenosis at the origin of the right brachiocephalic, right and left common carotid, left subclavian, and right and left vertebral arteries.    The right and left common, internal and external carotid arteriesare patent. There is no significant calcification and no stenosis at the common carotid artery bifurcations.  There is no evidence of significant stenosis or occlusion of the common carotid arteries, the carotid artery bifurcations, or along the course of the distal cervical portions of the right and left internal carotid arteries.    Normal branching pattern is noted of the bilateral external carotid arteries.    The vertebral arteries are patent.    FINDINGS - HEAD CTA:    The distal right and left internal carotid arteries are patent with mild calcification but no flow-limiting stenosis in the region of the cavernous and supraclinoid portions of the ICAs.    The ophthalmic arteries are patent.    There is normal contrast filling of the middle cerebral arteries and the anterior cerebral arteries bilaterally. No evidence of stenosis, occlusion or aneurysm.    The distal vertebral arteries are patent. The basilar artery is patent. There is normal filling of the PCAs, SCAs, PICAs and AICAsbilaterally. There is persistent fetal circulation of the left posterior cerebral artery from the left posterior communicating artery (anatomic variation). No evidence of stenosis, occlusion or aneurysm.      No venous abnormalities are noted. No evidence of filling defects within the venous sinuses.    OTHER: Bilateral apical pleural thickening is noted. There is a 6.7 mm hypodense nodule in the region of the right lobe of the thyroid gland that may be further evaluated by means of nonemergent thyroid sonography.    IMPRESSION:    No evidence of acute intracranial hemorrhage, acute territorial infarct, mass or midline shift.    Negative CTA of the head and neck    No evidence of major vascular stenosis, occlusion, or aneurysm.    --- End of Report ---    < end of copied text >    < from: MR Head w/wo IV Cont (06.01.22 @ 14:48) >    PROCEDURE DATE:  06/01/2022          INTERPRETATION:  CLINICAL INDICATION: Right face and arm numbness.    Technique: Contrast-enhanced brain MRI, non-contrast brain MRA and   contrast neck MRA was performed.    Through the brain, sagittal and axial T1, axial T2, FLAIR, diffusion   weighted images and an ADC map were obtained. Postcontrast axial and   sagittal T1-weighted images were obtained of the brain.  9 cc of   intravenous gadolinium was administered and 1 cc was discarded,  for   contrast enhanced brain MRI and MR angiography of the extracranial   circulation.    MR angiography of intracranial and extracranial circulation was performed   with time of flight imaging technique. Maximal intensity projection   images were reviewed in multiple planes.    COMPARISON: CT head dated 5/26/2022. CTA of the headand neck dated   7/19/2021    FINDINGS:    Brain MRI:  There is a focal region of diffusion-weighted signal abnormality   involving the left frontal lobe with associated patchy enhancement.    There is no evidence of intracranial hemorrhage, mass effect or midline   shift.    There is patchy periventricular as well as scattered subcortical white   matter T2 and FLAIR signal hyperintensities.    There is a chronic left frontal lobe infarct.    Ventricles and sulci are age-appropriate in size.    There is no extra-axial fluid collection.    Major intracranial flow-voids are preserved.    The orbits, sellar and suprasellar structures, and craniocervical   junction are unremarkable.    The calvarium demonstrates normal signal intensity.    The visualized paranasal sinuses and tympanomastoid cavities are clear.      Brain MRA:  Internal carotid arteries demonstrate unremarkable antegrade flow related   enhancement to the [place] of [place] bilaterally.    There is moderate stenosis noted of the bilateral M2 segments of the left   MCA. The distal MCA branch vessels are patent.    The anterior cerebral arteries are unremarkable. There is no evidence of   aneurysm, vascular malformation or occlusion.    The vertebral arteries are unremarkable to the vertebrobasilar   confluence. There is moderate stenosis of the bilateral proximal P2   segments of the PCAs. Distal vessels appear patent.    Neck MRA:  The aortic arch and origins of the great vessels are unremarkable.    Common and Internal Carotids: The origin, course and caliber of the   common and internal carotid arteries are unremarkable.  There is no   significant stenosis by NASCET criteria.    Vertebral arteries:  The origin, course and caliber of the vertebral arteries are   unremarkable. Both vessels are patent to the intracranial circulation and   vertebrobasilar confluence.      IMPRESSION:  BRAIN  Findings consistent with subacute left frontal lobe infarct without   evidence of hemorrhagic transformation.    Extensive white matter signal abnormalities statistically representing   moderate chronic microvascular type changes though other etiologies   including chronic infection/inflammation, demyelination or sequela of   migraine headaches could be considered.      BRAIN MRA  Moderate stenoses of the left proximal M2 divisions of the MCA as well as   the bilateral P2 segments of the PCAs. No evidence of vessel occlusion.    NECK MRA  No evidence of carotid or vertebral artery stenosis.    --- End of Report ---    < end of copied text >          Assessment / Plan: This is a 73y year old Female presenting with dizziness.  Patient had prior stroke in 2 years ago with mild right hemiparesis residual.  CTH findings are likely artifactual however given prior history of stroke would need to rule out new posterior circulation infarct  1. MRI brain w/o  2. If no acute findings on MRI then ok to discharge with stroke clinic followup in 2-4 weeks and start aspirin 81mg QD  3. If MRI brain shows an acute infarct then would add aspirin 81 and plavix 75mg

## 2024-03-31 NOTE — PHYSICAL THERAPY INITIAL EVALUATION ADULT - GENERAL OBSERVATIONS, REHAB EVAL
9;35-10;00 pt was seen for PT IE at bed side, pt is agreeable, chart thoroughly reviewed, RN Barb is aware.  Pt received and left sitting EOB, in no apparent distress, +hep lock, +telemonitoring, +call bell within reach, bed side table at reach 9;35-10;10 pt was seen for PT IE at bed side, pt is agreeable, chart thoroughly reviewed, RN Barb is aware.  Pt received and left sitting EOB, in no apparent distress, +hep lock, +telemonitoring, +call bell within reach, bed side table at reach

## 2024-03-31 NOTE — PROGRESS NOTE ADULT - SUBJECTIVE AND OBJECTIVE BOX
Patient is a 73y old  Female who presents with a chief complaint of Dizziness (30 Mar 2024 18:07)        MEDICATIONS  (STANDING):  aspirin  chewable 81 milliGRAM(s) Oral daily  enoxaparin Injectable 40 milliGRAM(s) SubCutaneous every 24 hours    MEDICATIONS  (PRN):  acetaminophen     Tablet .. 650 milliGRAM(s) Oral every 6 hours PRN Temp greater or equal to 38C (100.4F), Mild Pain (1 - 3)  aluminum hydroxide/magnesium hydroxide/simethicone Suspension 30 milliLiter(s) Oral every 4 hours PRN Dyspepsia  diazepam    Tablet 5 milliGRAM(s) Oral every 6 hours PRN dizziness  meclizine 25 milliGRAM(s) Oral every 8 hours PRN Dizziness  melatonin 3 milliGRAM(s) Oral at bedtime PRN Insomnia  ondansetron Injectable 4 milliGRAM(s) IV Push every 8 hours PRN Nausea and/or Vomiting      CAPILLARY BLOOD GLUCOSE  POCT Blood Glucose.: 85 mg/dL (30 Mar 2024 12:04)    I&O's Summary      PHYSICAL EXAM:  Vital Signs Last 24 Hrs  T(C): 36.4 (31 Mar 2024 05:00), Max: 36.8 (30 Mar 2024 21:01)  T(F): 97.6 (31 Mar 2024 05:00), Max: 98.3 (30 Mar 2024 21:01)  HR: 75 (31 Mar 2024 05:00) (75 - 91)  BP: 158/82 (31 Mar 2024 06:34) (150/65 - 234/102)  BP(mean): --  RR: 18 (31 Mar 2024 05:00) (18 - 20)  SpO2: 94% (31 Mar 2024 05:00) (94% - 100%)    Parameters below as of 30 Mar 2024 21:01  Patient On (Oxygen Delivery Method): room air      GENERAL: No acute distress, well-developed  HEAD:  Atraumatic, Normocephalic  EYES: EOMI, PERRLA, conjunctiva and sclera clear  NECK: Supple, no lymphadenopathy, no JVD  CHEST/LUNG: CTAB; No wheezes, rales, or rhonchi  HEART: Regular rate and rhythm; No murmurs, rubs, or gallops  ABDOMEN: Soft, non-tender, non-distended; normal bowel sounds,  EXTREMITIES:  2+ peripheral pulses b/l, No clubbing, cyanosis, or edema  NEUROLOGY: A&O x 3, no focal deficits  SKIN: No rashes or lesions    LABS:                        13.4   6.66  )-----------( 264      ( 31 Mar 2024 07:09 )             40.7     03-31    141  |  104  |  19  ----------------------------<  94  4.5   |  27  |  0.6<L>    Ca    8.9      31 Mar 2024 07:09  Mg     2.0     03-30    TPro  5.8<L>  /  Alb  3.6  /  TBili  0.3  /  DBili  x   /  AST  15  /  ALT  12  /  AlkPhos  93  03-31    Urinalysis Basic - ( 31 Mar 2024 07:09 )    Color: x / Appearance: x / SG: x / pH: x  Gluc: 94 mg/dL / Ketone: x  / Bili: x / Urobili: x   Blood: x / Protein: x / Nitrite: x   Leuk Esterase: x / RBC: x / WBC x   Sq Epi: x / Non Sq Epi: x / Bacteria: x

## 2024-04-01 ENCOUNTER — TRANSCRIPTION ENCOUNTER (OUTPATIENT)
Age: 74
End: 2024-04-01

## 2024-04-01 VITALS
OXYGEN SATURATION: 95 % | TEMPERATURE: 98 F | HEART RATE: 83 BPM | SYSTOLIC BLOOD PRESSURE: 162 MMHG | DIASTOLIC BLOOD PRESSURE: 71 MMHG | RESPIRATION RATE: 18 BRPM

## 2024-04-01 PROCEDURE — 99239 HOSP IP/OBS DSCHRG MGMT >30: CPT

## 2024-04-01 RX ORDER — ASPIRIN/CALCIUM CARB/MAGNESIUM 324 MG
1 TABLET ORAL
Qty: 60 | Refills: 0
Start: 2024-04-01 | End: 2024-04-30

## 2024-04-01 RX ORDER — ATORVASTATIN CALCIUM 80 MG/1
1 TABLET, FILM COATED ORAL
Qty: 30 | Refills: 0
Start: 2024-04-01 | End: 2024-04-30

## 2024-04-01 NOTE — OCCUPATIONAL THERAPY INITIAL EVALUATION ADULT - GENERAL OBSERVATIONS, REHAB EVAL
12:45-13:08; chart reviewed, ok to treat by Occupational Therapist as confirmed by RN Barb, Pt received seated at EOB +tele +E [facility] in [facility]. Pt denied pain at rest and in agreement with OT IE.

## 2024-04-01 NOTE — DISCHARGE NOTE PROVIDER - ATTENDING DISCHARGE PHYSICAL EXAMINATION:
GENERAL: No acute distress, well-developed  HEAD:  Atraumatic, Normocephalic  EYES: EOMI, PERRLA, conjunctiva and sclera clear  NECK: Supple, no lymphadenopathy, no JVD  CHEST/LUNG: CTAB; No wheezes, rales, or rhonchi  HEART: Regular rate and rhythm; No murmurs, rubs, or gallops  ABDOMEN: Soft, non-tender, non-distended; normal bowel sounds,  EXTREMITIES:  2+ peripheral pulses b/l, No clubbing, cyanosis, or edema  NEUROLOGY: A&O x 3, no focal deficits  SKIN: No rashes or lesions

## 2024-04-01 NOTE — DISCHARGE NOTE PROVIDER - CARE PROVIDERS DIRECT ADDRESSES
[email or web address]

## 2024-04-01 NOTE — OCCUPATIONAL THERAPY INITIAL EVALUATION ADULT - VISUAL ASSESSMENT: CONVERGENCE
I received a telephone message from Angelito Hitchcock at the Kaiser Foundation Hospital [address] [place] [place] [address]. Ofe Melinda Abdullahi RN normal

## 2024-04-01 NOTE — DISCHARGE NOTE PROVIDER - CARE PROVIDER_API CALL
Alex Jensen  Internal Medicine  [address] [place] [address]  Phone: [phone]  Fax: [phone]  Follow Up Time:     James Reid  Neurology  [address] [place] [address], [address]  [place] [address]  Phone: [phone]  Fax: [phone]  Follow Up Time: 1 week

## 2024-04-01 NOTE — DISCHARGE NOTE NURSING/CASE MANAGEMENT/SOCIAL WORK - PATIENT PORTAL LINK FT
You can access the FollowMyHealth Patient Portal offered by [facility] by registering at the following website: [email or web address]. By joining [facility]’s FollowMyHealth portal, you will also be able to view your health information using other applications (apps) compatible with our system.

## 2024-04-01 NOTE — DISCHARGE NOTE PROVIDER - NSDCMRMEDTOKEN_GEN_ALL_CORE_FT
aspirin 81 mg oral capsule: 1 cap(s) orally 2 times a day Please take aspirin po bid to prevent clot formation  atorvastatin 80 mg oral tablet: 1 tab(s) orally once a day

## 2024-04-01 NOTE — OCCUPATIONAL THERAPY INITIAL EVALUATION ADULT - SPECIFY REASON(S)
Chart reviewed. Attempted to see pt for bedside OT IE. Pt asked to defer at this time 2/2 currently eating breakfast. OT to f/u when appropriate; SCOUT Day aware.

## 2024-04-01 NOTE — OCCUPATIONAL THERAPY INITIAL EVALUATION ADULT - PERTINENT HX OF CURRENT PROBLEM, REHAB EVAL
73y old F with pmhx of HTN, HLD, GERD, TIA, anxiety presents to the ED complaining of intermittent dizziness that started few weeks ago waxing and waning. Pt states feels like she's going to pass out and worse with movement.  Pt states recent sinus infection, feels congested, eye pressure, headache and  rhinorrhea. s/p  hip surgery 8 weeks ago. pt admits to anxiety, high BP at home and sorethroat that has now resolved but denies chest/ neck pain, SOB, visual changes, numbness, paraesthesias, n/v/d, fever/chills or LOC.    CT head 3/30: Nonspecific low-attenuation involving the left brachium pontis as described above.  MRI 3/31: No acute stroke.

## 2024-04-01 NOTE — OCCUPATIONAL THERAPY INITIAL EVALUATION ADULT - LIVES WITH, PROFILE
This is a surgical and/or non-medical patient.
son in a private home 0 steps to enter then level inside +walk-in-shower with grab bars +standard toilet/children

## 2024-04-01 NOTE — DISCHARGE NOTE PROVIDER - NSDCCPCAREPLAN_GEN_ALL_CORE_FT
PRINCIPAL DISCHARGE DIAGNOSIS  Diagnosis: Dizziness  Assessment and Plan of Treatment: Now Resolved, an acute stroke was rulled out via MRI of the brain which showed  Our neurologist team evaluated you and recomeneded taking baby aspirin and Atovastatin which is a cholesterol pill   Follow-up with the neurologist for further recomendation in 1-2 weeks      SECONDARY DISCHARGE DIAGNOSES  Diagnosis: Near syncope  Assessment and Plan of Treatment:      PRINCIPAL DISCHARGE DIAGNOSIS  Diagnosis: Dizziness  Assessment and Plan of Treatment: Now Resolved, an acute stroke was rulled out via MRI of the brain which showed   1.  No acute stroke.  2.  Specific attention paid to the posterior fossa. Unremarkable  3.  Extensive microvascular ischemic changes, remote infarctions and   white matter signal abnormality as described. Mild progression since June 1, 2022  Our neurologist team evaluated you and recomeneded taking baby aspirin and Atovastatin which is a cholesterol pill   Follow-up with the neurologist for further recomendation in 1-2 weeks     PRINCIPAL DISCHARGE DIAGNOSIS  Diagnosis: Dizziness  Assessment and Plan of Treatment: Now Resolved, an acute stroke was rulled out via MRI of the brain which showed   1.  No acute stroke.  2.  Specific attention paid to the posterior fossa. Unremarkable  3.  Extensive microvascular ischemic changes, remote infarctions and   white matter signal abnormality as described. Mild progression since June 1, 2022  Our neurologist team evaluated you and recomeneded taking baby aspirin and Atovastatin which is a cholesterol pill   Follow-up with the neurologist for further recomendation in 1-2 weeks      SECONDARY DISCHARGE DIAGNOSES  Diagnosis: Sinus symptom  Assessment and Plan of Treatment: Follow-up with ENT for further evaluation

## 2024-04-01 NOTE — DISCHARGE NOTE PROVIDER - HOSPITAL COURSE
73y old female with pmhx of HTN, HLD, GERD, TIA, anxiety presents to the ED complaining of intermittent dizziness that started few weeks ago waxing  and wanining.  pt states feels like she's going to pass out and worse with movement.  Pt states recent sinus infection, feels congested, eye pressure, headache and  rhinorrhea. s/p  hip surgery 8 weeks ago. pt admits to anxiety, high BP at home and sorethroat that has now resolved   but denies chest/ neck pain, SOB, visual changes, numbness, paraesthesias, n/v/d, fever/chills or LOC.      Presenting with Near syncope and dizziness (sensation of herself spinning) with H/O TIA>>R/O Posterior Circulation CVA  Multiple H/O  Sinus infection with similar sensation that improves with bactrim   EKG: no sign of ischemia, HS troponin: 11>>11  CT Head with Hypoattenuation favor Acute infarct v.s Artifact>>F/up Brain MRI   Keep on Telemetry, get orthostatic vitals, meclizine and valium PRN   Q8 neuro-checks, PT and OT eval, neuro consult.

## 2024-04-06 DIAGNOSIS — R42 DIZZINESS AND GIDDINESS: ICD-10-CM

## 2024-04-06 DIAGNOSIS — I69.351 HEMIPLEGIA AND HEMIPARESIS FOLLOWING CEREBRAL INFARCTION AFFECTING RIGHT DOMINANT SIDE: ICD-10-CM

## 2024-04-06 DIAGNOSIS — Z79.82 LONG TERM (CURRENT) USE OF ASPIRIN: ICD-10-CM

## 2024-04-06 DIAGNOSIS — Z88.8 ALLERGY STATUS TO OTHER DRUGS, MEDICAMENTS AND BIOLOGICAL SUBSTANCES: ICD-10-CM

## 2024-04-06 DIAGNOSIS — I10 ESSENTIAL (PRIMARY) HYPERTENSION: ICD-10-CM

## 2024-04-06 DIAGNOSIS — Z98.890 OTHER SPECIFIED POSTPROCEDURAL STATES: ICD-10-CM

## 2024-04-06 DIAGNOSIS — E78.5 HYPERLIPIDEMIA, UNSPECIFIED: ICD-10-CM

## 2024-04-06 DIAGNOSIS — Z88.5 ALLERGY STATUS TO NARCOTIC AGENT: ICD-10-CM

## 2024-04-06 DIAGNOSIS — K21.9 GASTRO-ESOPHAGEAL REFLUX DISEASE WITHOUT ESOPHAGITIS: ICD-10-CM

## 2024-04-15 NOTE — DISCHARGE NOTE NURSING/CASE MANAGEMENT/SOCIAL WORK - NSDPACMPNY_GEN_ALL_CORE
Tejas Machado (:  1965) is a 58 y.o. male,Established patient, here for evaluation of the following chief complaint(s):  Follow-up Chronic Condition and Cough (Started over a week ago)          Subjective   SUBJECTIVE/OBJECTIVE:  Pt here today for chronic condition f/u, accompanied by wife  BP grossly elevated, monitors intermittently at home    Pt has had ongoing URI sx for the last 2 weeks  Grandchild was recently ill w/ similar sx  He has been taking daily Coricidin products for sx  Sx are improving, cough remains productive w/ clear sputum   Denies fevers or chills, no SOB    Use of OTC could also correlate w/ elevated BP  Upon chart review he had a cardiology f/u in November where his BP was also elevated  He was encouraged to monitor and home and f/u if remains elevated, this did not happen  He admits to not following a low sodium diet    He was seen by both GI and vascular for chronic conditions  Has not yet established w/ nephrology  His wife reports she calls frequently w/ no return call  This is in my opinion his most important specialist for him to see at this time d/t uncontrolled HTN and ongoing renal decline        Review of Systems       Objective   Physical Exam  Vitals and nursing note reviewed.   Constitutional:       General: He is not in acute distress.     Appearance: Normal appearance. He is not ill-appearing, toxic-appearing or diaphoretic.   Neck:      Vascular: Carotid bruit present.   Cardiovascular:      Rate and Rhythm: Normal rate and regular rhythm.      Heart sounds: Murmur heard.   Pulmonary:      Effort: Pulmonary effort is normal.      Breath sounds: Normal breath sounds.   Musculoskeletal:      Right lower leg: No edema.      Left lower leg: No edema.   Skin:     General: Skin is warm and dry.   Neurological:      Mental Status: He is alert.                 ASSESSMENT/PLAN:  1. Hypertensive urgency  2. HTN (hypertension), malignant  3. Stage 3b chronic kidney disease 
Family

## 2024-05-17 ENCOUNTER — APPOINTMENT (OUTPATIENT)
Dept: ORTHOPEDIC SURGERY | Facility: CLINIC | Age: 74
End: 2024-05-17

## 2024-07-03 ENCOUNTER — APPOINTMENT (OUTPATIENT)
Dept: ORTHOPEDIC SURGERY | Facility: CLINIC | Age: 74
End: 2024-07-03
Payer: MEDICARE

## 2024-07-03 DIAGNOSIS — M79.672 PAIN IN LEFT FOOT: ICD-10-CM

## 2024-07-03 PROCEDURE — 99213 OFFICE O/P EST LOW 20 MIN: CPT

## 2024-07-03 PROCEDURE — 73630 X-RAY EXAM OF FOOT: CPT | Mod: LT

## 2024-07-16 ENCOUNTER — APPOINTMENT (OUTPATIENT)
Dept: ORTHOPEDIC SURGERY | Facility: CLINIC | Age: 74
End: 2024-07-16
Payer: MEDICARE

## 2024-07-16 DIAGNOSIS — Z96.642 PRESENCE OF LEFT ARTIFICIAL HIP JOINT: ICD-10-CM

## 2024-07-16 PROCEDURE — 99213 OFFICE O/P EST LOW 20 MIN: CPT

## 2024-07-16 PROCEDURE — 73521 X-RAY EXAM HIPS BI 2 VIEWS: CPT

## 2024-07-31 ENCOUNTER — APPOINTMENT (OUTPATIENT)
Dept: ORTHOPEDIC SURGERY | Facility: CLINIC | Age: 74
End: 2024-07-31

## 2024-10-08 ENCOUNTER — APPOINTMENT (OUTPATIENT)
Dept: ORTHOPEDIC SURGERY | Facility: CLINIC | Age: 74
End: 2024-10-08

## 2025-02-13 NOTE — H&P ADULT - NSHPSOURCEINFORD_GEN_ALL_CORE
The patient was seen today for an ultrasound.  Please see ultrasound report (located under Ob Procedures) for additional details.   Thank you very much for allowing us to participate in the care of this very nice patient.  Should you have any questions, please do not hesitate to contact me.     Chu Edwards MD FACOG  Attending Physician, Maternal-Fetal Medicine  [facility]   Patient

## 2025-05-21 NOTE — OCCUPATIONAL THERAPY INITIAL EVALUATION ADULT - STANDING BALANCE: DYNAMIC, REHAB EVAL
Apixaban/Eliquis - Compliance/Apixaban/Eliquis - Dietary Advice/Apixaban/Eliquis - Follow up monitoring/Apixaban/Eliquis - Potential for adverse drug reactions and interactions fair minus

## 2025-07-16 ENCOUNTER — INPATIENT (INPATIENT)
Facility: HOSPITAL | Age: 75
LOS: 6 days | Discharge: HOME | DRG: 301 | End: 2025-07-23
Attending: INTERNAL MEDICINE | Admitting: STUDENT IN AN ORGANIZED HEALTH CARE EDUCATION/TRAINING PROGRAM
Payer: MEDICARE

## 2025-07-16 VITALS
HEIGHT: 66 IN | SYSTOLIC BLOOD PRESSURE: 186 MMHG | RESPIRATION RATE: 16 BRPM | WEIGHT: 149.91 LBS | OXYGEN SATURATION: 95 % | TEMPERATURE: 99 F | HEART RATE: 98 BPM | DIASTOLIC BLOOD PRESSURE: 79 MMHG

## 2025-07-16 DIAGNOSIS — I82.409 ACUTE EMBOLISM AND THROMBOSIS OF UNSPECIFIED DEEP VEINS OF UNSPECIFIED LOWER EXTREMITY: ICD-10-CM

## 2025-07-16 LAB
ALBUMIN SERPL ELPH-MCNC: 3.8 G/DL — SIGNIFICANT CHANGE UP (ref 3.5–5.2)
ALP SERPL-CCNC: 98 U/L — SIGNIFICANT CHANGE UP (ref 30–115)
ALT FLD-CCNC: 18 U/L — SIGNIFICANT CHANGE UP (ref 0–41)
ANION GAP SERPL CALC-SCNC: 11 MMOL/L — SIGNIFICANT CHANGE UP (ref 7–14)
APTT BLD: 31.7 SEC — SIGNIFICANT CHANGE UP (ref 27–39.2)
AST SERPL-CCNC: 21 U/L — SIGNIFICANT CHANGE UP (ref 0–41)
BASOPHILS # BLD AUTO: 0.09 K/UL — SIGNIFICANT CHANGE UP (ref 0–0.2)
BASOPHILS NFR BLD AUTO: 1.1 % — HIGH (ref 0–1)
BILIRUB SERPL-MCNC: <0.2 MG/DL — SIGNIFICANT CHANGE UP (ref 0.2–1.2)
BUN SERPL-MCNC: 27 MG/DL — HIGH (ref 10–20)
CALCIUM SERPL-MCNC: 8.6 MG/DL — SIGNIFICANT CHANGE UP (ref 8.4–10.5)
CHLORIDE SERPL-SCNC: 107 MMOL/L — SIGNIFICANT CHANGE UP (ref 98–110)
CO2 SERPL-SCNC: 24 MMOL/L — SIGNIFICANT CHANGE UP (ref 17–32)
CREAT SERPL-MCNC: 0.8 MG/DL — SIGNIFICANT CHANGE UP (ref 0.7–1.5)
EGFR: 76 ML/MIN/1.73M2 — SIGNIFICANT CHANGE UP
EGFR: 76 ML/MIN/1.73M2 — SIGNIFICANT CHANGE UP
EOSINOPHIL # BLD AUTO: 0.4 K/UL — SIGNIFICANT CHANGE UP (ref 0–0.7)
EOSINOPHIL NFR BLD AUTO: 4.8 % — SIGNIFICANT CHANGE UP (ref 0–8)
GLUCOSE SERPL-MCNC: 87 MG/DL — SIGNIFICANT CHANGE UP (ref 70–99)
HCT VFR BLD CALC: 39.6 % — SIGNIFICANT CHANGE UP (ref 37–47)
HGB BLD-MCNC: 12.9 G/DL — SIGNIFICANT CHANGE UP (ref 12–16)
IMM GRANULOCYTES NFR BLD AUTO: 0.2 % — SIGNIFICANT CHANGE UP (ref 0.1–0.3)
INR BLD: 1.03 RATIO — SIGNIFICANT CHANGE UP (ref 0.65–1.3)
LYMPHOCYTES # BLD AUTO: 1.51 K/UL — SIGNIFICANT CHANGE UP (ref 1.2–3.4)
LYMPHOCYTES # BLD AUTO: 18.3 % — LOW (ref 20.5–51.1)
MCHC RBC-ENTMCNC: 29.3 PG — SIGNIFICANT CHANGE UP (ref 27–31)
MCHC RBC-ENTMCNC: 32.6 G/DL — SIGNIFICANT CHANGE UP (ref 32–37)
MCV RBC AUTO: 89.8 FL — SIGNIFICANT CHANGE UP (ref 81–99)
MONOCYTES # BLD AUTO: 0.9 K/UL — HIGH (ref 0.1–0.6)
MONOCYTES NFR BLD AUTO: 10.9 % — HIGH (ref 1.7–9.3)
NEUTROPHILS # BLD AUTO: 5.35 K/UL — SIGNIFICANT CHANGE UP (ref 1.4–6.5)
NEUTROPHILS NFR BLD AUTO: 64.7 % — SIGNIFICANT CHANGE UP (ref 42.2–75.2)
NRBC BLD AUTO-RTO: 0 /100 WBCS — SIGNIFICANT CHANGE UP (ref 0–0)
PLATELET # BLD AUTO: 255 K/UL — SIGNIFICANT CHANGE UP (ref 130–400)
PMV BLD: 11.2 FL — HIGH (ref 7.4–10.4)
POTASSIUM SERPL-MCNC: 4.5 MMOL/L — SIGNIFICANT CHANGE UP (ref 3.5–5)
POTASSIUM SERPL-SCNC: 4.5 MMOL/L — SIGNIFICANT CHANGE UP (ref 3.5–5)
PROT SERPL-MCNC: 6.1 G/DL — SIGNIFICANT CHANGE UP (ref 6–8)
PROTHROM AB SERPL-ACNC: 12.2 SEC — SIGNIFICANT CHANGE UP (ref 9.95–12.87)
RBC # BLD: 4.41 M/UL — SIGNIFICANT CHANGE UP (ref 4.2–5.4)
RBC # FLD: 14.5 % — SIGNIFICANT CHANGE UP (ref 11.5–14.5)
SODIUM SERPL-SCNC: 142 MMOL/L — SIGNIFICANT CHANGE UP (ref 135–146)
WBC # BLD: 8.27 K/UL — SIGNIFICANT CHANGE UP (ref 4.8–10.8)
WBC # FLD AUTO: 8.27 K/UL — SIGNIFICANT CHANGE UP (ref 4.8–10.8)

## 2025-07-16 PROCEDURE — 83735 ASSAY OF MAGNESIUM: CPT

## 2025-07-16 PROCEDURE — 86901 BLOOD TYPING SEROLOGIC RH(D): CPT

## 2025-07-16 PROCEDURE — 97162 PT EVAL MOD COMPLEX 30 MIN: CPT | Mod: GP

## 2025-07-16 PROCEDURE — 85730 THROMBOPLASTIN TIME PARTIAL: CPT

## 2025-07-16 PROCEDURE — 85025 COMPLETE CBC W/AUTO DIFF WBC: CPT

## 2025-07-16 PROCEDURE — 86708 HEPATITIS A ANTIBODY: CPT

## 2025-07-16 PROCEDURE — 80053 COMPREHEN METABOLIC PANEL: CPT

## 2025-07-16 PROCEDURE — 85613 RUSSELL VIPER VENOM DILUTED: CPT

## 2025-07-16 PROCEDURE — 93307 TTE W/O DOPPLER COMPLETE: CPT

## 2025-07-16 PROCEDURE — 93925 LOWER EXTREMITY STUDY: CPT

## 2025-07-16 PROCEDURE — 87340 HEPATITIS B SURFACE AG IA: CPT

## 2025-07-16 PROCEDURE — 86705 HEP B CORE ANTIBODY IGM: CPT

## 2025-07-16 PROCEDURE — 76705 ECHO EXAM OF ABDOMEN: CPT

## 2025-07-16 PROCEDURE — 86704 HEP B CORE ANTIBODY TOTAL: CPT

## 2025-07-16 PROCEDURE — 86706 HEP B SURFACE ANTIBODY: CPT

## 2025-07-16 PROCEDURE — 86803 HEPATITIS C AB TEST: CPT

## 2025-07-16 PROCEDURE — 86147 CARDIOLIPIN ANTIBODY EA IG: CPT

## 2025-07-16 PROCEDURE — 80048 BASIC METABOLIC PNL TOTAL CA: CPT

## 2025-07-16 PROCEDURE — 86850 RBC ANTIBODY SCREEN: CPT

## 2025-07-16 PROCEDURE — 93970 EXTREMITY STUDY: CPT

## 2025-07-16 PROCEDURE — 99285 EMERGENCY DEPT VISIT HI MDM: CPT | Mod: FS

## 2025-07-16 PROCEDURE — 93975 VASCULAR STUDY: CPT

## 2025-07-16 PROCEDURE — 86709 HEPATITIS A IGM ANTIBODY: CPT

## 2025-07-16 PROCEDURE — 36415 COLL VENOUS BLD VENIPUNCTURE: CPT

## 2025-07-16 PROCEDURE — 86900 BLOOD TYPING SEROLOGIC ABO: CPT

## 2025-07-16 PROCEDURE — 86146 BETA-2 GLYCOPROTEIN ANTIBODY: CPT

## 2025-07-17 DIAGNOSIS — R09.89 OTHER SPECIFIED SYMPTOMS AND SIGNS INVOLVING THE CIRCULATORY AND RESPIRATORY SYSTEMS: ICD-10-CM

## 2025-07-17 PROCEDURE — 99233 SBSQ HOSP IP/OBS HIGH 50: CPT | Mod: GC

## 2025-07-17 PROCEDURE — 99223 1ST HOSP IP/OBS HIGH 75: CPT

## 2025-07-17 PROCEDURE — 93970 EXTREMITY STUDY: CPT | Mod: 26

## 2025-07-17 RX ORDER — MAGNESIUM, ALUMINUM HYDROXIDE 200-200 MG
30 TABLET,CHEWABLE ORAL EVERY 4 HOURS
Refills: 0 | Status: DISCONTINUED | OUTPATIENT
Start: 2025-07-17 | End: 2025-07-23

## 2025-07-17 RX ORDER — ONDANSETRON HCL/PF 4 MG/2 ML
4 VIAL (ML) INJECTION EVERY 8 HOURS
Refills: 0 | Status: DISCONTINUED | OUTPATIENT
Start: 2025-07-17 | End: 2025-07-23

## 2025-07-17 RX ORDER — MELATONIN 5 MG
3 TABLET ORAL AT BEDTIME
Refills: 0 | Status: DISCONTINUED | OUTPATIENT
Start: 2025-07-17 | End: 2025-07-23

## 2025-07-17 RX ORDER — ENOXAPARIN SODIUM 100 MG/ML
70 INJECTION SUBCUTANEOUS EVERY 12 HOURS
Refills: 0 | Status: DISCONTINUED | OUTPATIENT
Start: 2025-07-17 | End: 2025-07-18

## 2025-07-17 RX ORDER — ACETAMINOPHEN 500 MG/5ML
650 LIQUID (ML) ORAL EVERY 6 HOURS
Refills: 0 | Status: DISCONTINUED | OUTPATIENT
Start: 2025-07-17 | End: 2025-07-20

## 2025-07-17 RX ORDER — AMLODIPINE BESYLATE 10 MG/1
5 TABLET ORAL ONCE
Refills: 0 | Status: COMPLETED | OUTPATIENT
Start: 2025-07-17 | End: 2025-07-17

## 2025-07-17 RX ADMIN — AMLODIPINE BESYLATE 5 MILLIGRAM(S): 10 TABLET ORAL at 17:25

## 2025-07-17 RX ADMIN — Medication 1 APPLICATION(S): at 12:55

## 2025-07-18 LAB
ANION GAP SERPL CALC-SCNC: 13 MMOL/L — SIGNIFICANT CHANGE UP (ref 7–14)
BASOPHILS # BLD AUTO: 0.1 K/UL — SIGNIFICANT CHANGE UP (ref 0–0.2)
BASOPHILS NFR BLD AUTO: 1.2 % — HIGH (ref 0–1)
BUN SERPL-MCNC: 21 MG/DL — HIGH (ref 10–20)
CALCIUM SERPL-MCNC: 9.2 MG/DL — SIGNIFICANT CHANGE UP (ref 8.4–10.5)
CHLORIDE SERPL-SCNC: 100 MMOL/L — SIGNIFICANT CHANGE UP (ref 98–110)
CO2 SERPL-SCNC: 26 MMOL/L — SIGNIFICANT CHANGE UP (ref 17–32)
CREAT SERPL-MCNC: 0.7 MG/DL — SIGNIFICANT CHANGE UP (ref 0.7–1.5)
EGFR: 90 ML/MIN/1.73M2 — SIGNIFICANT CHANGE UP
EGFR: 90 ML/MIN/1.73M2 — SIGNIFICANT CHANGE UP
EOSINOPHIL # BLD AUTO: 0.37 K/UL — SIGNIFICANT CHANGE UP (ref 0–0.7)
EOSINOPHIL NFR BLD AUTO: 4.3 % — SIGNIFICANT CHANGE UP (ref 0–8)
GLUCOSE SERPL-MCNC: 100 MG/DL — HIGH (ref 70–99)
HCT VFR BLD CALC: 46.4 % — SIGNIFICANT CHANGE UP (ref 37–47)
HGB BLD-MCNC: 15.2 G/DL — SIGNIFICANT CHANGE UP (ref 12–16)
IMM GRANULOCYTES NFR BLD AUTO: 0.4 % — HIGH (ref 0.1–0.3)
LYMPHOCYTES # BLD AUTO: 2.14 K/UL — SIGNIFICANT CHANGE UP (ref 1.2–3.4)
LYMPHOCYTES # BLD AUTO: 25.1 % — SIGNIFICANT CHANGE UP (ref 20.5–51.1)
MCHC RBC-ENTMCNC: 29.1 PG — SIGNIFICANT CHANGE UP (ref 27–31)
MCHC RBC-ENTMCNC: 32.8 G/DL — SIGNIFICANT CHANGE UP (ref 32–37)
MCV RBC AUTO: 88.7 FL — SIGNIFICANT CHANGE UP (ref 81–99)
MONOCYTES # BLD AUTO: 0.8 K/UL — HIGH (ref 0.1–0.6)
MONOCYTES NFR BLD AUTO: 9.4 % — HIGH (ref 1.7–9.3)
NEUTROPHILS # BLD AUTO: 5.1 K/UL — SIGNIFICANT CHANGE UP (ref 1.4–6.5)
NEUTROPHILS NFR BLD AUTO: 59.6 % — SIGNIFICANT CHANGE UP (ref 42.2–75.2)
NRBC BLD AUTO-RTO: 0 /100 WBCS — SIGNIFICANT CHANGE UP (ref 0–0)
PLATELET # BLD AUTO: 274 K/UL — SIGNIFICANT CHANGE UP (ref 130–400)
PMV BLD: 11.7 FL — HIGH (ref 7.4–10.4)
POTASSIUM SERPL-MCNC: 4 MMOL/L — SIGNIFICANT CHANGE UP (ref 3.5–5)
POTASSIUM SERPL-SCNC: 4 MMOL/L — SIGNIFICANT CHANGE UP (ref 3.5–5)
RBC # BLD: 5.23 M/UL — SIGNIFICANT CHANGE UP (ref 4.2–5.4)
RBC # FLD: 14.6 % — HIGH (ref 11.5–14.5)
SODIUM SERPL-SCNC: 139 MMOL/L — SIGNIFICANT CHANGE UP (ref 135–146)
WBC # BLD: 8.54 K/UL — SIGNIFICANT CHANGE UP (ref 4.8–10.8)
WBC # FLD AUTO: 8.54 K/UL — SIGNIFICANT CHANGE UP (ref 4.8–10.8)

## 2025-07-18 PROCEDURE — 93925 LOWER EXTREMITY STUDY: CPT | Mod: 26

## 2025-07-18 PROCEDURE — 99233 SBSQ HOSP IP/OBS HIGH 50: CPT

## 2025-07-18 RX ADMIN — ENOXAPARIN SODIUM 70 MILLIGRAM(S): 100 INJECTION SUBCUTANEOUS at 00:00

## 2025-07-18 RX ADMIN — ENOXAPARIN SODIUM 70 MILLIGRAM(S): 100 INJECTION SUBCUTANEOUS at 17:13

## 2025-07-18 RX ADMIN — Medication 4 MILLIGRAM(S): at 11:50

## 2025-07-18 RX ADMIN — Medication 1 APPLICATION(S): at 11:12

## 2025-07-18 RX ADMIN — ENOXAPARIN SODIUM 70 MILLIGRAM(S): 100 INJECTION SUBCUTANEOUS at 05:07

## 2025-07-19 LAB
ALBUMIN SERPL ELPH-MCNC: 4.1 G/DL — SIGNIFICANT CHANGE UP (ref 3.5–5.2)
ALP SERPL-CCNC: 128 U/L — HIGH (ref 30–115)
ALT FLD-CCNC: 85 U/L — HIGH (ref 0–41)
ANION GAP SERPL CALC-SCNC: 14 MMOL/L — SIGNIFICANT CHANGE UP (ref 7–14)
AST SERPL-CCNC: 92 U/L — HIGH (ref 0–41)
BASOPHILS # BLD AUTO: 0.08 K/UL — SIGNIFICANT CHANGE UP (ref 0–0.2)
BASOPHILS NFR BLD AUTO: 0.8 % — SIGNIFICANT CHANGE UP (ref 0–1)
BILIRUB SERPL-MCNC: 0.6 MG/DL — SIGNIFICANT CHANGE UP (ref 0.2–1.2)
BLD GP AB SCN SERPL QL: SIGNIFICANT CHANGE UP
BUN SERPL-MCNC: 21 MG/DL — HIGH (ref 10–20)
CALCIUM SERPL-MCNC: 9.2 MG/DL — SIGNIFICANT CHANGE UP (ref 8.4–10.5)
CHLORIDE SERPL-SCNC: 99 MMOL/L — SIGNIFICANT CHANGE UP (ref 98–110)
CO2 SERPL-SCNC: 27 MMOL/L — SIGNIFICANT CHANGE UP (ref 17–32)
CREAT SERPL-MCNC: 0.6 MG/DL — LOW (ref 0.7–1.5)
EGFR: 93 ML/MIN/1.73M2 — SIGNIFICANT CHANGE UP
EGFR: 93 ML/MIN/1.73M2 — SIGNIFICANT CHANGE UP
EOSINOPHIL # BLD AUTO: 0.32 K/UL — SIGNIFICANT CHANGE UP (ref 0–0.7)
EOSINOPHIL NFR BLD AUTO: 3.1 % — SIGNIFICANT CHANGE UP (ref 0–8)
GLUCOSE SERPL-MCNC: 94 MG/DL — SIGNIFICANT CHANGE UP (ref 70–99)
HCT VFR BLD CALC: 44.7 % — SIGNIFICANT CHANGE UP (ref 37–47)
HGB BLD-MCNC: 14.6 G/DL — SIGNIFICANT CHANGE UP (ref 12–16)
IMM GRANULOCYTES NFR BLD AUTO: 0.5 % — HIGH (ref 0.1–0.3)
LYMPHOCYTES # BLD AUTO: 1.92 K/UL — SIGNIFICANT CHANGE UP (ref 1.2–3.4)
LYMPHOCYTES # BLD AUTO: 18.8 % — LOW (ref 20.5–51.1)
MAGNESIUM SERPL-MCNC: 1.9 MG/DL — SIGNIFICANT CHANGE UP (ref 1.8–2.4)
MCHC RBC-ENTMCNC: 29.1 PG — SIGNIFICANT CHANGE UP (ref 27–31)
MCHC RBC-ENTMCNC: 32.7 G/DL — SIGNIFICANT CHANGE UP (ref 32–37)
MCV RBC AUTO: 89.2 FL — SIGNIFICANT CHANGE UP (ref 81–99)
MONOCYTES # BLD AUTO: 1.12 K/UL — HIGH (ref 0.1–0.6)
MONOCYTES NFR BLD AUTO: 10.9 % — HIGH (ref 1.7–9.3)
NEUTROPHILS # BLD AUTO: 6.75 K/UL — HIGH (ref 1.4–6.5)
NEUTROPHILS NFR BLD AUTO: 65.9 % — SIGNIFICANT CHANGE UP (ref 42.2–75.2)
NRBC BLD AUTO-RTO: 0 /100 WBCS — SIGNIFICANT CHANGE UP (ref 0–0)
PLATELET # BLD AUTO: 274 K/UL — SIGNIFICANT CHANGE UP (ref 130–400)
PMV BLD: 11.4 FL — HIGH (ref 7.4–10.4)
POTASSIUM SERPL-MCNC: 3.9 MMOL/L — SIGNIFICANT CHANGE UP (ref 3.5–5)
POTASSIUM SERPL-SCNC: 3.9 MMOL/L — SIGNIFICANT CHANGE UP (ref 3.5–5)
PROT SERPL-MCNC: 7 G/DL — SIGNIFICANT CHANGE UP (ref 6–8)
RBC # BLD: 5.01 M/UL — SIGNIFICANT CHANGE UP (ref 4.2–5.4)
RBC # FLD: 14.6 % — HIGH (ref 11.5–14.5)
SODIUM SERPL-SCNC: 140 MMOL/L — SIGNIFICANT CHANGE UP (ref 135–146)
WBC # BLD: 10.24 K/UL — SIGNIFICANT CHANGE UP (ref 4.8–10.8)
WBC # FLD AUTO: 10.24 K/UL — SIGNIFICANT CHANGE UP (ref 4.8–10.8)

## 2025-07-19 PROCEDURE — 99232 SBSQ HOSP IP/OBS MODERATE 35: CPT

## 2025-07-19 RX ORDER — ENOXAPARIN SODIUM 100 MG/ML
70 INJECTION SUBCUTANEOUS EVERY 12 HOURS
Refills: 0 | Status: DISCONTINUED | OUTPATIENT
Start: 2025-07-19 | End: 2025-07-21

## 2025-07-19 RX ADMIN — ENOXAPARIN SODIUM 70 MILLIGRAM(S): 100 INJECTION SUBCUTANEOUS at 11:00

## 2025-07-19 RX ADMIN — Medication 650 MILLIGRAM(S): at 12:26

## 2025-07-19 RX ADMIN — Medication 1 APPLICATION(S): at 11:00

## 2025-07-19 RX ADMIN — ENOXAPARIN SODIUM 70 MILLIGRAM(S): 100 INJECTION SUBCUTANEOUS at 21:15

## 2025-07-19 RX ADMIN — Medication 650 MILLIGRAM(S): at 13:10

## 2025-07-20 LAB
ALBUMIN SERPL ELPH-MCNC: 4 G/DL — SIGNIFICANT CHANGE UP (ref 3.5–5.2)
ALP SERPL-CCNC: 140 U/L — HIGH (ref 30–115)
ALT FLD-CCNC: 161 U/L — HIGH (ref 0–41)
ANION GAP SERPL CALC-SCNC: 12 MMOL/L — SIGNIFICANT CHANGE UP (ref 7–14)
AST SERPL-CCNC: 127 U/L — HIGH (ref 0–41)
BASOPHILS # BLD AUTO: 0.08 K/UL — SIGNIFICANT CHANGE UP (ref 0–0.2)
BASOPHILS NFR BLD AUTO: 0.8 % — SIGNIFICANT CHANGE UP (ref 0–1)
BILIRUB SERPL-MCNC: 0.5 MG/DL — SIGNIFICANT CHANGE UP (ref 0.2–1.2)
BUN SERPL-MCNC: 15 MG/DL — SIGNIFICANT CHANGE UP (ref 10–20)
CALCIUM SERPL-MCNC: 9.1 MG/DL — SIGNIFICANT CHANGE UP (ref 8.4–10.5)
CHLORIDE SERPL-SCNC: 98 MMOL/L — SIGNIFICANT CHANGE UP (ref 98–110)
CO2 SERPL-SCNC: 29 MMOL/L — SIGNIFICANT CHANGE UP (ref 17–32)
CREAT SERPL-MCNC: 0.6 MG/DL — LOW (ref 0.7–1.5)
EGFR: 93 ML/MIN/1.73M2 — SIGNIFICANT CHANGE UP
EGFR: 93 ML/MIN/1.73M2 — SIGNIFICANT CHANGE UP
EOSINOPHIL # BLD AUTO: 0.23 K/UL — SIGNIFICANT CHANGE UP (ref 0–0.7)
EOSINOPHIL NFR BLD AUTO: 2.3 % — SIGNIFICANT CHANGE UP (ref 0–8)
GLUCOSE SERPL-MCNC: 113 MG/DL — HIGH (ref 70–99)
HCT VFR BLD CALC: 43.9 % — SIGNIFICANT CHANGE UP (ref 37–47)
HGB BLD-MCNC: 14.5 G/DL — SIGNIFICANT CHANGE UP (ref 12–16)
IMM GRANULOCYTES NFR BLD AUTO: 0.6 % — HIGH (ref 0.1–0.3)
LYMPHOCYTES # BLD AUTO: 1.78 K/UL — SIGNIFICANT CHANGE UP (ref 1.2–3.4)
LYMPHOCYTES # BLD AUTO: 18 % — LOW (ref 20.5–51.1)
MAGNESIUM SERPL-MCNC: 1.9 MG/DL — SIGNIFICANT CHANGE UP (ref 1.8–2.4)
MCHC RBC-ENTMCNC: 29.6 PG — SIGNIFICANT CHANGE UP (ref 27–31)
MCHC RBC-ENTMCNC: 33 G/DL — SIGNIFICANT CHANGE UP (ref 32–37)
MCV RBC AUTO: 89.6 FL — SIGNIFICANT CHANGE UP (ref 81–99)
MONOCYTES # BLD AUTO: 1 K/UL — HIGH (ref 0.1–0.6)
MONOCYTES NFR BLD AUTO: 10.1 % — HIGH (ref 1.7–9.3)
NEUTROPHILS # BLD AUTO: 6.74 K/UL — HIGH (ref 1.4–6.5)
NEUTROPHILS NFR BLD AUTO: 68.2 % — SIGNIFICANT CHANGE UP (ref 42.2–75.2)
NRBC BLD AUTO-RTO: 0 /100 WBCS — SIGNIFICANT CHANGE UP (ref 0–0)
PLATELET # BLD AUTO: 284 K/UL — SIGNIFICANT CHANGE UP (ref 130–400)
PMV BLD: 11.4 FL — HIGH (ref 7.4–10.4)
POTASSIUM SERPL-MCNC: 3.7 MMOL/L — SIGNIFICANT CHANGE UP (ref 3.5–5)
POTASSIUM SERPL-SCNC: 3.7 MMOL/L — SIGNIFICANT CHANGE UP (ref 3.5–5)
PROT SERPL-MCNC: 7 G/DL — SIGNIFICANT CHANGE UP (ref 6–8)
RBC # BLD: 4.9 M/UL — SIGNIFICANT CHANGE UP (ref 4.2–5.4)
RBC # FLD: 14.6 % — HIGH (ref 11.5–14.5)
SODIUM SERPL-SCNC: 139 MMOL/L — SIGNIFICANT CHANGE UP (ref 135–146)
WBC # BLD: 9.89 K/UL — SIGNIFICANT CHANGE UP (ref 4.8–10.8)
WBC # FLD AUTO: 9.89 K/UL — SIGNIFICANT CHANGE UP (ref 4.8–10.8)

## 2025-07-20 PROCEDURE — 99233 SBSQ HOSP IP/OBS HIGH 50: CPT

## 2025-07-20 PROCEDURE — 76705 ECHO EXAM OF ABDOMEN: CPT | Mod: 26

## 2025-07-20 RX ADMIN — ENOXAPARIN SODIUM 70 MILLIGRAM(S): 100 INJECTION SUBCUTANEOUS at 08:41

## 2025-07-20 RX ADMIN — Medication 650 MILLIGRAM(S): at 05:12

## 2025-07-20 RX ADMIN — Medication 1 APPLICATION(S): at 11:18

## 2025-07-20 RX ADMIN — ENOXAPARIN SODIUM 70 MILLIGRAM(S): 100 INJECTION SUBCUTANEOUS at 21:11

## 2025-07-20 RX ADMIN — Medication 650 MILLIGRAM(S): at 05:42

## 2025-07-21 LAB
ALBUMIN SERPL ELPH-MCNC: 3.8 G/DL — SIGNIFICANT CHANGE UP (ref 3.5–5.2)
ALBUMIN SERPL ELPH-MCNC: 4.1 G/DL — SIGNIFICANT CHANGE UP (ref 3.5–5.2)
ALP SERPL-CCNC: 155 U/L — HIGH (ref 30–115)
ALP SERPL-CCNC: 175 U/L — HIGH (ref 30–115)
ALT FLD-CCNC: 187 U/L — HIGH (ref 0–41)
ALT FLD-CCNC: 249 U/L — HIGH (ref 0–41)
ANION GAP SERPL CALC-SCNC: 13 MMOL/L — SIGNIFICANT CHANGE UP (ref 7–14)
ANION GAP SERPL CALC-SCNC: 13 MMOL/L — SIGNIFICANT CHANGE UP (ref 7–14)
AST SERPL-CCNC: 117 U/L — HIGH (ref 0–41)
AST SERPL-CCNC: 221 U/L — HIGH (ref 0–41)
BASOPHILS # BLD AUTO: 0.08 K/UL — SIGNIFICANT CHANGE UP (ref 0–0.2)
BASOPHILS NFR BLD AUTO: 0.7 % — SIGNIFICANT CHANGE UP (ref 0–1)
BILIRUB SERPL-MCNC: 0.4 MG/DL — SIGNIFICANT CHANGE UP (ref 0.2–1.2)
BILIRUB SERPL-MCNC: 0.4 MG/DL — SIGNIFICANT CHANGE UP (ref 0.2–1.2)
BUN SERPL-MCNC: 19 MG/DL — SIGNIFICANT CHANGE UP (ref 10–20)
BUN SERPL-MCNC: 20 MG/DL — SIGNIFICANT CHANGE UP (ref 10–20)
CALCIUM SERPL-MCNC: 8.9 MG/DL — SIGNIFICANT CHANGE UP (ref 8.4–10.5)
CALCIUM SERPL-MCNC: 9.3 MG/DL — SIGNIFICANT CHANGE UP (ref 8.4–10.5)
CHLORIDE SERPL-SCNC: 96 MMOL/L — LOW (ref 98–110)
CHLORIDE SERPL-SCNC: 97 MMOL/L — LOW (ref 98–110)
CO2 SERPL-SCNC: 30 MMOL/L — SIGNIFICANT CHANGE UP (ref 17–32)
CO2 SERPL-SCNC: 31 MMOL/L — SIGNIFICANT CHANGE UP (ref 17–32)
CREAT SERPL-MCNC: 0.5 MG/DL — LOW (ref 0.7–1.5)
CREAT SERPL-MCNC: 0.5 MG/DL — LOW (ref 0.7–1.5)
EGFR: 97 ML/MIN/1.73M2 — SIGNIFICANT CHANGE UP
EOSINOPHIL # BLD AUTO: 0.27 K/UL — SIGNIFICANT CHANGE UP (ref 0–0.7)
EOSINOPHIL NFR BLD AUTO: 2.3 % — SIGNIFICANT CHANGE UP (ref 0–8)
GLUCOSE SERPL-MCNC: 143 MG/DL — HIGH (ref 70–99)
GLUCOSE SERPL-MCNC: 89 MG/DL — SIGNIFICANT CHANGE UP (ref 70–99)
HCT VFR BLD CALC: 44.1 % — SIGNIFICANT CHANGE UP (ref 37–47)
HCV AB S/CO SERPL IA: 0.06 COI — SIGNIFICANT CHANGE UP
HCV AB SERPL-IMP: SIGNIFICANT CHANGE UP
HGB BLD-MCNC: 14.4 G/DL — SIGNIFICANT CHANGE UP (ref 12–16)
IMM GRANULOCYTES NFR BLD AUTO: 0.7 % — HIGH (ref 0.1–0.3)
LYMPHOCYTES # BLD AUTO: 1.65 K/UL — SIGNIFICANT CHANGE UP (ref 1.2–3.4)
LYMPHOCYTES # BLD AUTO: 13.9 % — LOW (ref 20.5–51.1)
MAGNESIUM SERPL-MCNC: 1.9 MG/DL — SIGNIFICANT CHANGE UP (ref 1.8–2.4)
MCHC RBC-ENTMCNC: 29.1 PG — SIGNIFICANT CHANGE UP (ref 27–31)
MCHC RBC-ENTMCNC: 32.7 G/DL — SIGNIFICANT CHANGE UP (ref 32–37)
MCV RBC AUTO: 89.3 FL — SIGNIFICANT CHANGE UP (ref 81–99)
MONOCYTES # BLD AUTO: 1.15 K/UL — HIGH (ref 0.1–0.6)
MONOCYTES NFR BLD AUTO: 9.7 % — HIGH (ref 1.7–9.3)
NEUTROPHILS # BLD AUTO: 8.63 K/UL — HIGH (ref 1.4–6.5)
NEUTROPHILS NFR BLD AUTO: 72.7 % — SIGNIFICANT CHANGE UP (ref 42.2–75.2)
NRBC BLD AUTO-RTO: 0 /100 WBCS — SIGNIFICANT CHANGE UP (ref 0–0)
PLATELET # BLD AUTO: 297 K/UL — SIGNIFICANT CHANGE UP (ref 130–400)
PMV BLD: 11.4 FL — HIGH (ref 7.4–10.4)
POTASSIUM SERPL-MCNC: 3.8 MMOL/L — SIGNIFICANT CHANGE UP (ref 3.5–5)
POTASSIUM SERPL-MCNC: 4.1 MMOL/L — SIGNIFICANT CHANGE UP (ref 3.5–5)
POTASSIUM SERPL-SCNC: 3.8 MMOL/L — SIGNIFICANT CHANGE UP (ref 3.5–5)
POTASSIUM SERPL-SCNC: 4.1 MMOL/L — SIGNIFICANT CHANGE UP (ref 3.5–5)
PROT SERPL-MCNC: 6.5 G/DL — SIGNIFICANT CHANGE UP (ref 6–8)
PROT SERPL-MCNC: 7 G/DL — SIGNIFICANT CHANGE UP (ref 6–8)
RBC # BLD: 4.94 M/UL — SIGNIFICANT CHANGE UP (ref 4.2–5.4)
RBC # FLD: 14.6 % — HIGH (ref 11.5–14.5)
SODIUM SERPL-SCNC: 139 MMOL/L — SIGNIFICANT CHANGE UP (ref 135–146)
SODIUM SERPL-SCNC: 141 MMOL/L — SIGNIFICANT CHANGE UP (ref 135–146)
WBC # BLD: 11.86 K/UL — HIGH (ref 4.8–10.8)
WBC # FLD AUTO: 11.86 K/UL — HIGH (ref 4.8–10.8)

## 2025-07-21 PROCEDURE — 99233 SBSQ HOSP IP/OBS HIGH 50: CPT

## 2025-07-21 PROCEDURE — 99223 1ST HOSP IP/OBS HIGH 75: CPT

## 2025-07-21 RX ORDER — HYDROCHLOROTHIAZIDE 50 MG/1
1 TABLET ORAL
Qty: 30 | Refills: 0
Start: 2025-07-21

## 2025-07-21 RX ORDER — APIXABAN 5 MG/1
1 TABLET, FILM COATED ORAL
Qty: 60 | Refills: 0
Start: 2025-07-21 | End: 2025-08-19

## 2025-07-21 RX ORDER — APIXABAN 5 MG/1
5 TABLET, FILM COATED ORAL EVERY 12 HOURS
Refills: 0 | Status: DISCONTINUED | OUTPATIENT
Start: 2025-07-21 | End: 2025-07-23

## 2025-07-21 RX ADMIN — APIXABAN 5 MILLIGRAM(S): 5 TABLET, FILM COATED ORAL at 21:21

## 2025-07-21 RX ADMIN — Medication 1 APPLICATION(S): at 12:30

## 2025-07-21 RX ADMIN — ENOXAPARIN SODIUM 70 MILLIGRAM(S): 100 INJECTION SUBCUTANEOUS at 09:34

## 2025-07-21 RX ADMIN — Medication 30 MILLILITER(S): at 21:21

## 2025-07-22 ENCOUNTER — RESULT REVIEW (OUTPATIENT)
Age: 75
End: 2025-07-22

## 2025-07-22 LAB
ALBUMIN SERPL ELPH-MCNC: 3.5 G/DL — SIGNIFICANT CHANGE UP (ref 3.5–5.2)
ALP SERPL-CCNC: 174 U/L — HIGH (ref 30–115)
ALT FLD-CCNC: 285 U/L — HIGH (ref 0–41)
ANION GAP SERPL CALC-SCNC: 9 MMOL/L — SIGNIFICANT CHANGE UP (ref 7–14)
APTT BLD: 33.8 SEC — SIGNIFICANT CHANGE UP (ref 27–39.2)
AST SERPL-CCNC: 200 U/L — HIGH (ref 0–41)
BASOPHILS # BLD AUTO: 0.05 K/UL — SIGNIFICANT CHANGE UP (ref 0–0.2)
BASOPHILS NFR BLD AUTO: 0.5 % — SIGNIFICANT CHANGE UP (ref 0–1)
BILIRUB SERPL-MCNC: 0.5 MG/DL — SIGNIFICANT CHANGE UP (ref 0.2–1.2)
BUN SERPL-MCNC: 17 MG/DL — SIGNIFICANT CHANGE UP (ref 10–20)
CALCIUM SERPL-MCNC: 8.6 MG/DL — SIGNIFICANT CHANGE UP (ref 8.4–10.5)
CHLORIDE SERPL-SCNC: 102 MMOL/L — SIGNIFICANT CHANGE UP (ref 98–110)
CO2 SERPL-SCNC: 30 MMOL/L — SIGNIFICANT CHANGE UP (ref 17–32)
CREAT SERPL-MCNC: 0.6 MG/DL — LOW (ref 0.7–1.5)
EGFR: 93 ML/MIN/1.73M2 — SIGNIFICANT CHANGE UP
EGFR: 93 ML/MIN/1.73M2 — SIGNIFICANT CHANGE UP
EOSINOPHIL # BLD AUTO: 0.18 K/UL — SIGNIFICANT CHANGE UP (ref 0–0.7)
EOSINOPHIL NFR BLD AUTO: 1.9 % — SIGNIFICANT CHANGE UP (ref 0–8)
GLUCOSE SERPL-MCNC: 97 MG/DL — SIGNIFICANT CHANGE UP (ref 70–99)
HAV IGG SER QL IA: SIGNIFICANT CHANGE UP
HAV IGM SER-ACNC: SIGNIFICANT CHANGE UP
HBV CORE AB SER-ACNC: SIGNIFICANT CHANGE UP
HBV CORE IGM SER-ACNC: SIGNIFICANT CHANGE UP
HBV SURFACE AB SER-ACNC: ABNORMAL
HBV SURFACE AG SER-ACNC: SIGNIFICANT CHANGE UP
HCT VFR BLD CALC: 39.7 % — SIGNIFICANT CHANGE UP (ref 37–47)
HGB BLD-MCNC: 13 G/DL — SIGNIFICANT CHANGE UP (ref 12–16)
IMM GRANULOCYTES NFR BLD AUTO: 0.4 % — HIGH (ref 0.1–0.3)
LYMPHOCYTES # BLD AUTO: 1.87 K/UL — SIGNIFICANT CHANGE UP (ref 1.2–3.4)
LYMPHOCYTES # BLD AUTO: 20 % — LOW (ref 20.5–51.1)
MAGNESIUM SERPL-MCNC: 2 MG/DL — SIGNIFICANT CHANGE UP (ref 1.8–2.4)
MCHC RBC-ENTMCNC: 29.4 PG — SIGNIFICANT CHANGE UP (ref 27–31)
MCHC RBC-ENTMCNC: 32.7 G/DL — SIGNIFICANT CHANGE UP (ref 32–37)
MCV RBC AUTO: 89.8 FL — SIGNIFICANT CHANGE UP (ref 81–99)
MONOCYTES # BLD AUTO: 1.06 K/UL — HIGH (ref 0.1–0.6)
MONOCYTES NFR BLD AUTO: 11.4 % — HIGH (ref 1.7–9.3)
NEUTROPHILS # BLD AUTO: 6.13 K/UL — SIGNIFICANT CHANGE UP (ref 1.4–6.5)
NEUTROPHILS NFR BLD AUTO: 65.8 % — SIGNIFICANT CHANGE UP (ref 42.2–75.2)
NRBC BLD AUTO-RTO: 0 /100 WBCS — SIGNIFICANT CHANGE UP (ref 0–0)
PLATELET # BLD AUTO: 289 K/UL — SIGNIFICANT CHANGE UP (ref 130–400)
PMV BLD: 11.3 FL — HIGH (ref 7.4–10.4)
POTASSIUM SERPL-MCNC: 3.7 MMOL/L — SIGNIFICANT CHANGE UP (ref 3.5–5)
POTASSIUM SERPL-SCNC: 3.7 MMOL/L — SIGNIFICANT CHANGE UP (ref 3.5–5)
PROT SERPL-MCNC: 5.8 G/DL — LOW (ref 6–8)
RBC # BLD: 4.42 M/UL — SIGNIFICANT CHANGE UP (ref 4.2–5.4)
RBC # FLD: 14.5 % — SIGNIFICANT CHANGE UP (ref 11.5–14.5)
SODIUM SERPL-SCNC: 141 MMOL/L — SIGNIFICANT CHANGE UP (ref 135–146)
WBC # BLD: 9.33 K/UL — SIGNIFICANT CHANGE UP (ref 4.8–10.8)
WBC # FLD AUTO: 9.33 K/UL — SIGNIFICANT CHANGE UP (ref 4.8–10.8)

## 2025-07-22 PROCEDURE — 99233 SBSQ HOSP IP/OBS HIGH 50: CPT

## 2025-07-22 PROCEDURE — 93306 TTE W/DOPPLER COMPLETE: CPT | Mod: 26

## 2025-07-22 PROCEDURE — 93975 VASCULAR STUDY: CPT | Mod: 26

## 2025-07-22 RX ADMIN — APIXABAN 5 MILLIGRAM(S): 5 TABLET, FILM COATED ORAL at 09:16

## 2025-07-22 RX ADMIN — APIXABAN 5 MILLIGRAM(S): 5 TABLET, FILM COATED ORAL at 21:45

## 2025-07-22 RX ADMIN — Medication 1 APPLICATION(S): at 11:10

## 2025-07-23 ENCOUNTER — TRANSCRIPTION ENCOUNTER (OUTPATIENT)
Age: 75
End: 2025-07-23

## 2025-07-23 VITALS
SYSTOLIC BLOOD PRESSURE: 143 MMHG | TEMPERATURE: 98 F | OXYGEN SATURATION: 95 % | HEART RATE: 101 BPM | DIASTOLIC BLOOD PRESSURE: 69 MMHG | RESPIRATION RATE: 18 BRPM

## 2025-07-23 LAB
ALBUMIN SERPL ELPH-MCNC: 3.4 G/DL — LOW (ref 3.5–5.2)
ALP SERPL-CCNC: 184 U/L — HIGH (ref 30–115)
ALT FLD-CCNC: 275 U/L — HIGH (ref 0–41)
ANION GAP SERPL CALC-SCNC: 11 MMOL/L — SIGNIFICANT CHANGE UP (ref 7–14)
AST SERPL-CCNC: 148 U/L — HIGH (ref 0–41)
B2 GLYCOPROT1 IGA SER QL: <2 U/ML — SIGNIFICANT CHANGE UP
B2 GLYCOPROT1 IGG SER-ACNC: <1.4 U/ML — SIGNIFICANT CHANGE UP
B2 GLYCOPROT1 IGM SER-ACNC: <1.5 U/ML — SIGNIFICANT CHANGE UP
BILIRUB SERPL-MCNC: 0.4 MG/DL — SIGNIFICANT CHANGE UP (ref 0.2–1.2)
BUN SERPL-MCNC: 17 MG/DL — SIGNIFICANT CHANGE UP (ref 10–20)
CALCIUM SERPL-MCNC: 8.6 MG/DL — SIGNIFICANT CHANGE UP (ref 8.4–10.5)
CARDIOLIPIN IGM SER-MCNC: <1.5 MPL U/ML — SIGNIFICANT CHANGE UP
CARDIOLIPIN IGM SER-MCNC: <1.6 GPL U/ML — SIGNIFICANT CHANGE UP
CHLORIDE SERPL-SCNC: 100 MMOL/L — SIGNIFICANT CHANGE UP (ref 98–110)
CO2 SERPL-SCNC: 29 MMOL/L — SIGNIFICANT CHANGE UP (ref 17–32)
CREAT SERPL-MCNC: 0.5 MG/DL — LOW (ref 0.7–1.5)
DEPRECATED CARDIOLIPIN IGA SER: <2 APL U/ML — SIGNIFICANT CHANGE UP
EGFR: 97 ML/MIN/1.73M2 — SIGNIFICANT CHANGE UP
EGFR: 97 ML/MIN/1.73M2 — SIGNIFICANT CHANGE UP
GLUCOSE SERPL-MCNC: 98 MG/DL — SIGNIFICANT CHANGE UP (ref 70–99)
POTASSIUM SERPL-MCNC: 4.4 MMOL/L — SIGNIFICANT CHANGE UP (ref 3.5–5)
POTASSIUM SERPL-SCNC: 4.4 MMOL/L — SIGNIFICANT CHANGE UP (ref 3.5–5)
PROT SERPL-MCNC: 5.7 G/DL — LOW (ref 6–8)
SODIUM SERPL-SCNC: 140 MMOL/L — SIGNIFICANT CHANGE UP (ref 135–146)

## 2025-07-23 PROCEDURE — 99239 HOSP IP/OBS DSCHRG MGMT >30: CPT

## 2025-07-23 RX ORDER — HYDROCHLOROTHIAZIDE 50 MG/1
1 TABLET ORAL
Qty: 30 | Refills: 0
Start: 2025-07-23 | End: 2025-08-21

## 2025-07-23 RX ADMIN — Medication 1 APPLICATION(S): at 11:17

## 2025-07-23 RX ADMIN — APIXABAN 5 MILLIGRAM(S): 5 TABLET, FILM COATED ORAL at 08:49

## 2025-07-23 RX ADMIN — Medication 30 MILLILITER(S): at 00:18

## 2025-07-25 LAB
DRVVT RATIO: 1.04 RATIO — SIGNIFICANT CHANGE UP (ref 0–1.21)
DRVVT SCREEN TO CONFIRM RATIO: SIGNIFICANT CHANGE UP
NORMALIZED SCT PPP-RTO: 1.16 RATIO — SIGNIFICANT CHANGE UP (ref 0–1.16)
NORMALIZED SCT PPP-RTO: SIGNIFICANT CHANGE UP

## 2025-07-30 DIAGNOSIS — Z96.642 PRESENCE OF LEFT ARTIFICIAL HIP JOINT: ICD-10-CM

## 2025-07-30 DIAGNOSIS — E78.5 HYPERLIPIDEMIA, UNSPECIFIED: ICD-10-CM

## 2025-07-30 DIAGNOSIS — I82.552 CHRONIC EMBOLISM AND THROMBOSIS OF LEFT PERONEAL VEIN: ICD-10-CM

## 2025-07-30 DIAGNOSIS — I82.412 ACUTE EMBOLISM AND THROMBOSIS OF LEFT FEMORAL VEIN: ICD-10-CM

## 2025-07-30 DIAGNOSIS — I82.432 ACUTE EMBOLISM AND THROMBOSIS OF LEFT POPLITEAL VEIN: ICD-10-CM

## 2025-07-30 DIAGNOSIS — K71.9 TOXIC LIVER DISEASE, UNSPECIFIED: ICD-10-CM

## 2025-07-30 DIAGNOSIS — I70.92 CHRONIC TOTAL OCCLUSION OF ARTERY OF THE EXTREMITIES: ICD-10-CM

## 2025-07-30 DIAGNOSIS — Z88.8 ALLERGY STATUS TO OTHER DRUGS, MEDICAMENTS AND BIOLOGICAL SUBSTANCES: ICD-10-CM
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
## 2025-07-30 DIAGNOSIS — I70.213 ATHEROSCLEROSIS OF NATIVE ARTERIES OF EXTREMITIES WITH INTERMITTENT CLAUDICATION, BILATERAL LEGS: ICD-10-CM

## 2025-07-30 DIAGNOSIS — Z79.82 LONG TERM (CURRENT) USE OF ASPIRIN: ICD-10-CM

## 2025-07-30 DIAGNOSIS — I10 ESSENTIAL (PRIMARY) HYPERTENSION: ICD-10-CM

## 2025-07-30 DIAGNOSIS — Z86.73 PERSONAL HISTORY OF TRANSIENT ISCHEMIC ATTACK (TIA), AND CEREBRAL INFARCTION WITHOUT RESIDUAL DEFICITS: ICD-10-CM

## 2025-07-30 DIAGNOSIS — Z88.5 ALLERGY STATUS TO NARCOTIC AGENT: ICD-10-CM

## 2025-08-04 ENCOUNTER — NON-APPOINTMENT (OUTPATIENT)
Age: 75
End: 2025-08-04

## 2025-08-06 ENCOUNTER — APPOINTMENT (OUTPATIENT)
Dept: VASCULAR SURGERY | Facility: CLINIC | Age: 75
End: 2025-08-06
Payer: MEDICARE

## 2025-08-06 VITALS — HEIGHT: 64 IN | BODY MASS INDEX: 21.85 KG/M2 | WEIGHT: 128 LBS

## 2025-08-06 DIAGNOSIS — M79.89 OTHER SPECIFIED SOFT TISSUE DISORDERS: ICD-10-CM

## 2025-08-06 DIAGNOSIS — I70.229 ATHEROSCLEROSIS OF NATIVE ARTERIES OF EXTREMITIES WITH REST PAIN, UNSPECIFIED EXTREMITY: ICD-10-CM

## 2025-08-06 DIAGNOSIS — I70.219 ATHEROSCLEROSIS OF NATIVE ARTERIES OF EXTREMITIES WITH INTERMITTENT CLAUDICATION, UNSPECIFIED EXTREMITY: ICD-10-CM

## 2025-08-06 PROCEDURE — 93970 EXTREMITY STUDY: CPT

## 2025-08-06 PROCEDURE — 99204 OFFICE O/P NEW MOD 45 MIN: CPT

## 2025-08-06 RX ORDER — LORAZEPAM 1 MG/1
1 TABLET ORAL
Qty: 2 | Refills: 0 | Status: ACTIVE | COMMUNITY
Start: 2025-08-06 | End: 1900-01-01

## 2025-08-06 RX ORDER — CILOSTAZOL 100 MG/1
100 TABLET ORAL TWICE DAILY
Qty: 60 | Refills: 5 | Status: ACTIVE | COMMUNITY
Start: 2025-08-06 | End: 1900-01-01

## 2025-08-06 RX ORDER — APIXABAN 5 MG/1
5 TABLET, FILM COATED ORAL
Refills: 0 | Status: ACTIVE | COMMUNITY

## 2025-08-21 ENCOUNTER — APPOINTMENT (OUTPATIENT)
Age: 75
End: 2025-08-21
Payer: MEDICARE

## 2025-08-21 VITALS
HEART RATE: 81 BPM | DIASTOLIC BLOOD PRESSURE: 106 MMHG | OXYGEN SATURATION: 99 % | RESPIRATION RATE: 16 BRPM | BODY MASS INDEX: 22.43 KG/M2 | TEMPERATURE: 98.1 F | HEIGHT: 64.5 IN | WEIGHT: 133 LBS | SYSTOLIC BLOOD PRESSURE: 208 MMHG

## 2025-08-21 VITALS — DIASTOLIC BLOOD PRESSURE: 100 MMHG | SYSTOLIC BLOOD PRESSURE: 180 MMHG

## 2025-08-21 DIAGNOSIS — I82.90 ACUTE EMBOLISM AND THROMBOSIS OF UNSPECIFIED VEIN: ICD-10-CM

## 2025-08-21 DIAGNOSIS — Z96.642 PRESENCE OF LEFT ARTIFICIAL HIP JOINT: ICD-10-CM

## 2025-08-21 DIAGNOSIS — I63.9 CEREBRAL INFARCTION, UNSPECIFIED: ICD-10-CM

## 2025-08-21 DIAGNOSIS — I25.10 ATHEROSCLEROTIC HEART DISEASE OF NATIVE CORONARY ARTERY W/OUT ANGINA PECTORIS: ICD-10-CM

## 2025-08-21 DIAGNOSIS — Z87.891 PERSONAL HISTORY OF NICOTINE DEPENDENCE: ICD-10-CM

## 2025-08-21 DIAGNOSIS — M79.89 OTHER SPECIFIED SOFT TISSUE DISORDERS: ICD-10-CM

## 2025-08-21 DIAGNOSIS — I70.219 ATHEROSCLEROSIS OF NATIVE ARTERIES OF EXTREMITIES WITH INTERMITTENT CLAUDICATION, UNSPECIFIED EXTREMITY: ICD-10-CM

## 2025-08-21 PROCEDURE — 99205 OFFICE O/P NEW HI 60 MIN: CPT

## 2025-08-21 PROCEDURE — G2211 COMPLEX E/M VISIT ADD ON: CPT

## 2025-08-21 RX ORDER — LORAZEPAM 1 MG/1
1 TABLET ORAL
Qty: 1 | Refills: 0 | Status: ACTIVE | COMMUNITY
Start: 2025-08-21 | End: 1900-01-01